# Patient Record
Sex: FEMALE | Race: BLACK OR AFRICAN AMERICAN | NOT HISPANIC OR LATINO | Employment: FULL TIME | URBAN - METROPOLITAN AREA
[De-identification: names, ages, dates, MRNs, and addresses within clinical notes are randomized per-mention and may not be internally consistent; named-entity substitution may affect disease eponyms.]

---

## 2020-08-07 ENCOUNTER — HOSPITAL ENCOUNTER (EMERGENCY)
Facility: HOSPITAL | Age: 40
Discharge: HOME | End: 2020-08-07
Attending: EMERGENCY MEDICINE
Payer: COMMERCIAL

## 2020-08-07 VITALS
BODY MASS INDEX: 43.4 KG/M2 | WEIGHT: 293 LBS | OXYGEN SATURATION: 100 % | HEIGHT: 69 IN | RESPIRATION RATE: 17 BRPM | TEMPERATURE: 99 F | SYSTOLIC BLOOD PRESSURE: 140 MMHG | DIASTOLIC BLOOD PRESSURE: 79 MMHG | HEART RATE: 81 BPM

## 2020-08-07 DIAGNOSIS — R07.81 RIB PAIN: ICD-10-CM

## 2020-08-07 DIAGNOSIS — R10.9 ABDOMINAL PAIN, UNSPECIFIED ABDOMINAL LOCATION: Primary | ICD-10-CM

## 2020-08-07 DIAGNOSIS — R10.9 ABDOMINAL PAIN: ICD-10-CM

## 2020-08-07 LAB
ALBUMIN SERPL BCP-MCNC: 3.5 G/DL (ref 3.5–5.2)
ALP SERPL-CCNC: 75 U/L (ref 55–135)
ALT SERPL W/O P-5'-P-CCNC: 39 U/L (ref 10–44)
ANION GAP SERPL CALC-SCNC: 7 MMOL/L (ref 8–16)
AST SERPL-CCNC: 47 U/L (ref 10–40)
B-HCG UR QL: NEGATIVE
BACTERIA #/AREA URNS AUTO: NORMAL /HPF
BASOPHILS # BLD AUTO: 0.05 K/UL (ref 0–0.2)
BASOPHILS NFR BLD: 0.8 % (ref 0–1.9)
BILIRUB SERPL-MCNC: 0.7 MG/DL (ref 0.1–1)
BILIRUB UR QL STRIP: NEGATIVE
BUN SERPL-MCNC: 7 MG/DL (ref 6–20)
CALCIUM SERPL-MCNC: 8.7 MG/DL (ref 8.7–10.5)
CHLORIDE SERPL-SCNC: 100 MMOL/L (ref 95–110)
CLARITY UR REFRACT.AUTO: CLEAR
CO2 SERPL-SCNC: 26 MMOL/L (ref 23–29)
COLOR UR AUTO: YELLOW
CREAT SERPL-MCNC: 0.8 MG/DL (ref 0.5–1.4)
CTP QC/QA: YES
DIFFERENTIAL METHOD: ABNORMAL
EOSINOPHIL # BLD AUTO: 0.2 K/UL (ref 0–0.5)
EOSINOPHIL NFR BLD: 3.2 % (ref 0–8)
ERYTHROCYTE [DISTWIDTH] IN BLOOD BY AUTOMATED COUNT: 14.4 % (ref 11.5–14.5)
EST. GFR  (AFRICAN AMERICAN): >60 ML/MIN/1.73 M^2
EST. GFR  (NON AFRICAN AMERICAN): >60 ML/MIN/1.73 M^2
GLUCOSE SERPL-MCNC: 96 MG/DL (ref 70–110)
GLUCOSE UR QL STRIP: NEGATIVE
HCT VFR BLD AUTO: 41 % (ref 37–48.5)
HGB BLD-MCNC: 12.9 G/DL (ref 12–16)
HGB UR QL STRIP: NEGATIVE
IMM GRANULOCYTES # BLD AUTO: 0.02 K/UL (ref 0–0.04)
IMM GRANULOCYTES NFR BLD AUTO: 0.3 % (ref 0–0.5)
KETONES UR QL STRIP: NEGATIVE
LEUKOCYTE ESTERASE UR QL STRIP: NEGATIVE
LIPASE SERPL-CCNC: 15 U/L (ref 4–60)
LYMPHOCYTES # BLD AUTO: 1.3 K/UL (ref 1–4.8)
LYMPHOCYTES NFR BLD: 20.9 % (ref 18–48)
MCH RBC QN AUTO: 28.6 PG (ref 27–31)
MCHC RBC AUTO-ENTMCNC: 31.5 G/DL (ref 32–36)
MCV RBC AUTO: 91 FL (ref 82–98)
MICROSCOPIC COMMENT: NORMAL
MONOCYTES # BLD AUTO: 0.7 K/UL (ref 0.3–1)
MONOCYTES NFR BLD: 11.2 % (ref 4–15)
NEUTROPHILS # BLD AUTO: 3.8 K/UL (ref 1.8–7.7)
NEUTROPHILS NFR BLD: 63.6 % (ref 38–73)
NITRITE UR QL STRIP: NEGATIVE
NRBC BLD-RTO: 0 /100 WBC
PH UR STRIP: 6 [PH] (ref 5–8)
PLATELET # BLD AUTO: 275 K/UL (ref 150–350)
PMV BLD AUTO: 11.7 FL (ref 9.2–12.9)
POTASSIUM SERPL-SCNC: 3.8 MMOL/L (ref 3.5–5.1)
PROT SERPL-MCNC: 7.9 G/DL (ref 6–8.4)
PROT UR QL STRIP: NEGATIVE
RBC # BLD AUTO: 4.51 M/UL (ref 4–5.4)
RBC #/AREA URNS AUTO: 1 /HPF (ref 0–4)
SODIUM SERPL-SCNC: 133 MMOL/L (ref 136–145)
SP GR UR STRIP: 1.02 (ref 1–1.03)
SQUAMOUS #/AREA URNS AUTO: 3 /HPF
URN SPEC COLLECT METH UR: NORMAL
WBC # BLD AUTO: 5.99 K/UL (ref 3.9–12.7)
WBC #/AREA URNS AUTO: 1 /HPF (ref 0–5)

## 2020-08-07 PROCEDURE — 99284 PR EMERGENCY DEPT VISIT,LEVEL IV: ICD-10-PCS | Mod: ,,, | Performed by: EMERGENCY MEDICINE

## 2020-08-07 PROCEDURE — 81001 URINALYSIS AUTO W/SCOPE: CPT

## 2020-08-07 PROCEDURE — 99284 EMERGENCY DEPT VISIT MOD MDM: CPT | Mod: 25

## 2020-08-07 PROCEDURE — 25000003 PHARM REV CODE 250: Performed by: EMERGENCY MEDICINE

## 2020-08-07 PROCEDURE — 99284 EMERGENCY DEPT VISIT MOD MDM: CPT | Mod: ,,, | Performed by: EMERGENCY MEDICINE

## 2020-08-07 PROCEDURE — 83690 ASSAY OF LIPASE: CPT

## 2020-08-07 PROCEDURE — 85025 COMPLETE CBC W/AUTO DIFF WBC: CPT

## 2020-08-07 PROCEDURE — 81025 URINE PREGNANCY TEST: CPT | Performed by: EMERGENCY MEDICINE

## 2020-08-07 PROCEDURE — 96360 HYDRATION IV INFUSION INIT: CPT

## 2020-08-07 PROCEDURE — 80053 COMPREHEN METABOLIC PANEL: CPT

## 2020-08-07 PROCEDURE — 96361 HYDRATE IV INFUSION ADD-ON: CPT

## 2020-08-07 RX ORDER — FAMOTIDINE 20 MG/1
20 TABLET, FILM COATED ORAL 2 TIMES DAILY
Qty: 20 TABLET | Refills: 0 | Status: SHIPPED | OUTPATIENT
Start: 2020-08-07 | End: 2020-08-14 | Stop reason: ALTCHOICE

## 2020-08-07 RX ORDER — ONDANSETRON 4 MG/1
4 TABLET, FILM COATED ORAL EVERY 8 HOURS PRN
Qty: 12 TABLET | Refills: 0 | Status: SHIPPED | OUTPATIENT
Start: 2020-08-07 | End: 2020-08-14

## 2020-08-07 RX ORDER — OMEPRAZOLE 20 MG/1
40 CAPSULE, DELAYED RELEASE ORAL DAILY
Qty: 60 CAPSULE | Refills: 0 | Status: SHIPPED | OUTPATIENT
Start: 2020-08-07 | End: 2020-08-14 | Stop reason: SDUPTHER

## 2020-08-07 RX ORDER — FAMOTIDINE 20 MG/1
20 TABLET, FILM COATED ORAL
Status: COMPLETED | OUTPATIENT
Start: 2020-08-07 | End: 2020-08-07

## 2020-08-07 RX ADMIN — FAMOTIDINE 20 MG: 20 TABLET, FILM COATED ORAL at 12:08

## 2020-08-07 RX ADMIN — SODIUM CHLORIDE 1000 ML: 0.9 INJECTION, SOLUTION INTRAVENOUS at 12:08

## 2020-08-07 NOTE — ED TRIAGE NOTES
"Patient states intermittent upper right abd pain that's feels "inflammed" States she stays "full" with known UTI. Unknown fevers, positive nausea.   "

## 2020-08-07 NOTE — ED PROVIDER NOTES
Chief complaint:  Abdominal Pain (r upper abd pain, nausea, had bladder infection)      HPI:  Elva Caceres is a 40 y.o. female presenting with acute onset of a pain to her right posterior lower rib cage that started about a week ago and has gradually been getting worse.  She describes it as a discomfort that originally starts in her back but then wraps around to the front of her abdomen towards her epigastrium.  She states that eating makes her pain worse and pain will eventually go away if she does not eat anything.  She has had some nausea but no vomiting.  Some subjective fevers.  She has had some recent antibiotics secondary to a bladder infection.    ROS: As per HPI and below:  Constitutional:  Subjective fevers, no chills  Eyes: no visual changes  Cardiac: no chest pain  Respiratory: no shortness of breath  Abdominal:  abdominal pain,  nausea, no vomiting  Genitourinary: No dysuria, no frequency  Skin: no rash  Heme: no bleeding  Musculoskeletal:  Right-sided flank pain  Neuro: no focal numbness, no focal weakness  Pyschological: no depression      Review of patient's allergies indicates:  No Known Allergies    No current facility-administered medications on file prior to encounter.      Current Outpatient Medications on File Prior to Encounter   Medication Sig Dispense Refill    triamterene-hydrochlorothiazide 37.5-25 mg (DYAZIDE) 37.5-25 mg per capsule TAKE ONE CAPSULE BY MOUTH IN THE MORNING 30 capsule 0       PMH:  As per HPI and below:  Past Medical History:   Diagnosis Date    Asthma     Hypertension     Vaginal trichomoniasis     several episodes last episode was early July 2015     No past surgical history on file.    Social History     Socioeconomic History    Marital status: Single     Spouse name: Not on file    Number of children: Not on file    Years of education: Not on file    Highest education level: Not on file   Occupational History    Not on file   Social Needs    Financial  resource strain: Not on file    Food insecurity     Worry: Not on file     Inability: Not on file    Transportation needs     Medical: Not on file     Non-medical: Not on file   Tobacco Use    Smoking status: Never Smoker   Substance and Sexual Activity    Alcohol use: Yes     Alcohol/week: 0.0 standard drinks    Drug use: No    Sexual activity: Not on file   Lifestyle    Physical activity     Days per week: Not on file     Minutes per session: Not on file    Stress: Not on file   Relationships    Social connections     Talks on phone: Not on file     Gets together: Not on file     Attends [employer] service: Not on file     Active member of club or organization: Not on file     Attends meetings of clubs or organizations: Not on file     Relationship status: Not on file   Other Topics Concern    Not on file   Social History Narrative    Not on file       No family history on file.    Physical Exam:    Vitals:    08/07/20 1450   BP: (!) 140/79   Pulse: 81   Resp: 17   Temp: 98.9 °F (37.2 °C)     Constitutional: Well-nourished, well-developed, in no acute distress, not cachectic  Eyes: PERRLA, EOMI, normal conjunctiva, normal sclera  ENT: Moist Mucous membranes  Respiratory: Clear to auscultation bilaterally, no wheezes, no crackles, no rhonchi  Cardiovascular: Regular rate and rhythm, no murmurs, no rubs, no gallops  Abdominal: Soft, mild epigastric abdominal tenderness, nondistended, no guarding, no rebound, equivocal Elizabeth sign, no CVA tenderness  Musculoskeletal: Normal range of motion, no obvious deformity, normal capillary refill, head atraumatic, neck supple, no meningismus  Skin: no rash, no ecchymosis, no errythema, no discharge  Neurologic: Cranial nerves II through XII intact, no motor deficits, no sensory deficits, no cerebellar deficits  Psychological: Alert, oriented x3, normal affect, normal mood    Orders Placed This Encounter   Procedures    X-Ray Chest PA And Lateral    US Abdomen  Limited (Gallbladder)    CBC auto differential    Comprehensive metabolic panel    Lipase    Urinalysis, Reflex to Urine Culture Urine, Clean Catch    Urinalysis Microscopic    POCT urine pregnancy    Insert Saline lock IV       Medications   sodium chloride 0.9% bolus 1,000 mL (0 mLs Intravenous Stopped 8/7/20 1513)   famotidine tablet 20 mg (20 mg Oral Given 8/7/20 1244)         Labs Reviewed   CBC W/ AUTO DIFFERENTIAL - Abnormal; Notable for the following components:       Result Value    Mean Corpuscular Hemoglobin Conc 31.5 (*)     All other components within normal limits   COMPREHENSIVE METABOLIC PANEL - Abnormal; Notable for the following components:    Sodium 133 (*)     AST 47 (*)     Anion Gap 7 (*)     All other components within normal limits   LIPASE   URINALYSIS, REFLEX TO URINE CULTURE    Narrative:     Specimen Source->Urine   URINALYSIS MICROSCOPIC    Narrative:     Specimen Source->Urine   POCT URINE PREGNANCY       MDM  Number of Diagnoses or Management Options  Abdominal pain, unspecified abdominal location:   Abdominal pain:   Rib pain:   Diagnosis management comments: Differential diagnosis includes gastritis, pancreatitis, cholecystitis, hepatitis, pneumonia, kidney stone, pyelonephritis    Patient presents with about a week's worth of pain that starts near her right lower ribcage and radiates around to her epigastric region.  By history, this feels more like gastritis with some referred pain.  Will check laboratories and chest x-ray as well as a right upper quadrant ultrasound and re-evaluate    Pt's labs and US are unremarkable.  No evidence for PE.  Pain is likely due to gastric source with referred pain.  Will dc with pepcid and prilosec and PCP fu.       Amount and/or Complexity of Data Reviewed  Clinical lab tests: ordered and reviewed  Tests in the radiology section of CPT®: ordered  Decide to obtain previous medical records or to obtain history from someone other than the  patient: yes  Independent visualization of images, tracings, or specimens: yes (RUQ US: nad)                  ASSESSMENT  1. Abdominal pain, unspecified abdominal location    2. Rib pain    3. Abdominal pain          Disposition:  Discharge    Discharge Medication List as of 8/7/2020  3:09 PM      START taking these medications    Details   famotidine (PEPCID) 20 MG tablet Take 1 tablet (20 mg total) by mouth 2 (two) times daily., Starting Fri 8/7/2020, Until Sat 8/7/2021, Normal      omeprazole (PRILOSEC) 20 MG capsule Take 2 capsules (40 mg total) by mouth once daily., Starting Fri 8/7/2020, Until Sat 8/7/2021, Normal      ondansetron (ZOFRAN) 4 MG tablet Take 1 tablet (4 mg total) by mouth every 8 (eight) hours as needed., Starting Fri 8/7/2020, Until Fri 8/14/2020, Normal           Discharge Medication List as of 8/7/2020  3:09 PM        Discharge Medication List as of 8/7/2020  3:09 PM             William Keane III, MD  08/08/20 0915

## 2020-08-07 NOTE — ED NOTES
Patient identifiers verified and correct for Ms Caceres  C/C: Intermittent abd pain   APPEARANCE: awake and alert in NAD.  SKIN: warm, dry and intact. No breakdown or bruising.  MUSCULOSKELETAL: Patient moving all extremities spontaneously, no obvious swelling or deformities noted. Ambulates independently.  RESPIRATORY: Denies shortness of breath.Respirations unlabored.   CARDIAC: Denies CP, 2+ distal pulses; no peripheral edema  ABDOMEN: ABd pain RUQ radiating ot back worse with eating positive nausea, no emesis unknown fevers   : voids spontaneously, denies difficulty  Neurologic: AAO x 4; follows commands equal strength in all extremities; denies numbness/tingling. Denies dizziness Denies weakness       Lori Taylor RN  08/07/20 121

## 2020-08-14 ENCOUNTER — OFFICE VISIT (OUTPATIENT)
Dept: FAMILY MEDICINE | Facility: HOSPITAL | Age: 40
End: 2020-08-14
Attending: FAMILY MEDICINE
Payer: COMMERCIAL

## 2020-08-14 ENCOUNTER — TELEPHONE (OUTPATIENT)
Dept: FAMILY MEDICINE | Facility: HOSPITAL | Age: 40
End: 2020-08-14

## 2020-08-14 VITALS
WEIGHT: 278.88 LBS | HEART RATE: 102 BPM | BODY MASS INDEX: 41.31 KG/M2 | SYSTOLIC BLOOD PRESSURE: 134 MMHG | HEIGHT: 69 IN | DIASTOLIC BLOOD PRESSURE: 90 MMHG

## 2020-08-14 DIAGNOSIS — K21.9 GASTROESOPHAGEAL REFLUX DISEASE WITHOUT ESOPHAGITIS: ICD-10-CM

## 2020-08-14 DIAGNOSIS — R13.19 ESOPHAGEAL DYSPHAGIA: ICD-10-CM

## 2020-08-14 DIAGNOSIS — J30.2 SEASONAL ALLERGIES: ICD-10-CM

## 2020-08-14 DIAGNOSIS — R35.0 URINARY FREQUENCY: ICD-10-CM

## 2020-08-14 DIAGNOSIS — R07.81 RIB PAIN ON RIGHT SIDE: Primary | ICD-10-CM

## 2020-08-14 PROCEDURE — 99213 OFFICE O/P EST LOW 20 MIN: CPT | Performed by: STUDENT IN AN ORGANIZED HEALTH CARE EDUCATION/TRAINING PROGRAM

## 2020-08-14 RX ORDER — TRIAMTERENE/HYDROCHLOROTHIAZID 37.5-25 MG
TABLET ORAL
COMMUNITY
Start: 2020-07-27 | End: 2020-08-14 | Stop reason: SDUPTHER

## 2020-08-14 RX ORDER — DICLOFENAC SODIUM 10 MG/G
2 GEL TOPICAL 4 TIMES DAILY
Qty: 1 TUBE | Refills: 5 | Status: SHIPPED | OUTPATIENT
Start: 2020-08-14

## 2020-08-14 RX ORDER — OMEPRAZOLE 20 MG/1
20 CAPSULE, DELAYED RELEASE ORAL 2 TIMES DAILY
Qty: 60 CAPSULE | Refills: 11 | Status: SHIPPED | OUTPATIENT
Start: 2020-08-14 | End: 2021-08-14

## 2020-08-14 NOTE — PATIENT INSTRUCTIONS
Stop the pepcid and start taking omeprazole 20mg twice a day for 2 weeks then go back to once per day.     Start taking an antihistamine either zyrtec or claritin daily. For your nose you can try flonase spray. If your nose is really dry you can try nasal saline spray. For your dry eyes try non-preservative eye drops.     Start using voltaren gel as needed for your rib pain. Work on doing stretched to open up your ribs. If the pain persists in a month please give me a call and we can discuss next steps.

## 2020-08-14 NOTE — PROGRESS NOTES
Progress Note  [facility]    Subjective:     Elva Caceres is a 40 y.o. year old female with PMHx of UTI in July treated with antibiotics who presents to clinic for hospital follow-up.     Pt was seen in the ER 8/7/2020 with right sided pain and diagnosed with GERD. Her pain starts on the back and radiates around to the front. Work-up negative for cholecystitis, cholelithiasis and pancreatitis. Discharged with omprazole 20mg daily. Since that time her rib pain has not improved. This will prevent her from finding a comfortable position to sit. Heating pad improves the pain. She has been unable to exercise as much due to the pain. She had a UTI treated with antibiotics in July. No abdominal or suprapubic pain but has some right sided flank pain similar to what she had during her UTI. She has also continued to have some urinary frequency. Pt reports completing her antibiotic course. Denies hematuria, dysuria, diarrhea, abdominal pain, fevers and chills.     Pt also has difficulty eating solid foods. She has converted herself to a soft diet due to the sensation of food getting stuck in her esophagus in her chest. No nausea or vomiting. Omeprazole has only slightly improved this but she continues to eat a soft diet. Symptoms started about a month ago after she finished antibiotics for a UTI in July. She has also had some post-nasal drip and sinus pain in this time she attributes to allergies. Tried afrin twice with no improvement in symptoms. No OTC antihistamines currently but took some in the past. Also has some eye dryness but no purities.     Patient Active Problem List    Diagnosis Date Noted    Suprapubic abdominal pain 08/13/2015    Lower back pain 08/13/2015    Gastric pain 08/13/2015    Adnexal tenderness, right 08/13/2015        Review of patient's allergies indicates:  No Known Allergies     Past Medical History:   Diagnosis Date    Asthma     Hypertension     Vaginal trichomoniasis      "several episodes last episode was early July 2015      No past surgical history on file.   No family history on file.   Social History     Tobacco Use    Smoking status: Current Some Day Smoker   Substance Use Topics    Alcohol use: Yes     Alcohol/week: 0.0 standard drinks        Objective:     Vitals:    08/14/20 0855   BP: (!) 134/90   Pulse: 102   Weight: 126.5 kg (278 lb 14.1 oz)   Height: 5' 9" (1.753 m)     BMI: 41.18    Gen: No apparent distress, well nourished and developed, appears stated age  CV: RRR, S1 and S2 present, no LE edema  Resp: CTAB, normal respiratory effort  Skin: 5mm hard dark lesion on the right flank, mobile, non-tender. Hard mobile mass with pinpoint dark spot on the left upper thoracic area, non-tender, non-erythematous  MSK: Tenderness to palpation of posterior 9th rib angle and rib on the right spanning around the front, poor mobility or rib. No edema or erythema or step-offs of rib.   Abd: soft, non-distended, BSx4, non-tender throughout. No CVA tenderness.     Assessment/Plan:     Elva Caceres is a 40 y.o. year old female with PMHx of UTI in July who presents to clinic for hospital follow-up. She needs an annual physical, plan to do this at next visit.     1. Urinary frequency  History of UTI in July, treated with antibiotics, but continues to have urinary frequency. Will recheck UA in clinic to confirm resolution and that right sided pain not related to kidney infection. Reassuring exam without CVA tenderness.   - POCT urine dipstick without microscope    2. Rib pain on right side  Her right sided pain appears most consistent with rib pain. Given she has some reflux problems educated to avoid NSAIDS. Can use topical Voltaren gel for relief. Pt also given some stretches to help open up her ribs and reduce the pain.   - diclofenac sodium (VOLTAREN) 1 % Gel; Apply 2 g topically 4 (four) times daily.  Dispense: 1 Tube; Refill: 5  - stretches multiple times per day     3. Esophageal " dysphagia  Her sensation of food getting stuck in her esophagus is likely related to allergies causing PND and reflux. Will increase omeprazole dose to 20mg BID and she should start taking antihistamines to reduce mucous load in the back of her throat. If these fail to improve symptoms will likely need EGD to r/o malignancy vs esophagitis vs gastric herniation vs esophageal motility disorder.    4. Gastroesophageal reflux disease without esophagitis  Plan as above  - omeprazole (PRILOSEC) 20 MG capsule; Take 1 capsule (20 mg total) by mouth 2 (two) times a day.  Dispense: 60 capsule; Refill: 11    5. Seasonal allergies  Plan as above  - start zyrtec or Claritin for allergies and PND  - flonase PRN for nasal symptoms  - non-preservative eye drops PRN for eye dryness    Follow-up:1 month    Case discussed with staff: Dr. Torsten Sorto,   \[facility]\"" Family Medicine, PGY-1

## 2020-09-02 ENCOUNTER — HOSPITAL ENCOUNTER (EMERGENCY)
Facility: HOSPITAL | Age: 40
Discharge: HOME | End: 2020-09-03
Attending: EMERGENCY MEDICINE
Payer: COMMERCIAL

## 2020-09-02 DIAGNOSIS — G51.8 FACIAL NEURALGIA: Primary | ICD-10-CM

## 2020-09-02 DIAGNOSIS — L72.3 SEBACEOUS CYST: ICD-10-CM

## 2020-09-02 LAB
B-HCG UR QL: NEGATIVE
CTP QC/QA: YES

## 2020-09-02 PROCEDURE — 99284 EMERGENCY DEPT VISIT MOD MDM: CPT | Mod: 25

## 2020-09-02 PROCEDURE — 81025 URINE PREGNANCY TEST: CPT | Performed by: NURSE PRACTITIONER

## 2020-09-02 PROCEDURE — 25000003 PHARM REV CODE 250: Performed by: EMERGENCY MEDICINE

## 2020-09-02 RX ORDER — PREDNISONE 20 MG/1
40 TABLET ORAL DAILY
Qty: 10 TABLET | Refills: 0 | Status: SHIPPED | OUTPATIENT
Start: 2020-09-02 | End: 2020-09-07

## 2020-09-02 RX ORDER — VALACYCLOVIR HYDROCHLORIDE 1 G/1
1000 TABLET, FILM COATED ORAL 3 TIMES DAILY
Qty: 21 TABLET | Refills: 0 | Status: SHIPPED | OUTPATIENT
Start: 2020-09-02 | End: 2020-09-09

## 2020-09-02 RX ORDER — PROPYLENE GLYCOL 0.06 MG/ML
1 EMULSION OPHTHALMIC 4 TIMES DAILY PRN
Refills: 0
Start: 2020-09-02

## 2020-09-02 RX ORDER — CARBAMAZEPINE 100 MG/1
100 TABLET, EXTENDED RELEASE ORAL 2 TIMES DAILY
Qty: 60 TABLET | Refills: 0 | Status: SHIPPED | OUTPATIENT
Start: 2020-09-02 | End: 2021-09-02

## 2020-09-02 RX ORDER — PROPARACAINE HYDROCHLORIDE 5 MG/ML
1 SOLUTION/ DROPS OPHTHALMIC
Status: COMPLETED | OUTPATIENT
Start: 2020-09-02 | End: 2020-09-02

## 2020-09-02 RX ADMIN — FLUORESCEIN SODIUM 1 EACH: 1 STRIP OPHTHALMIC at 11:09

## 2020-09-02 RX ADMIN — PROPARACAINE HYDROCHLORIDE 1 DROP: 5 SOLUTION/ DROPS OPHTHALMIC at 11:09

## 2020-09-03 VITALS
RESPIRATION RATE: 18 BRPM | OXYGEN SATURATION: 100 % | BODY MASS INDEX: 42.29 KG/M2 | HEIGHT: 69 IN | TEMPERATURE: 98 F | HEART RATE: 89 BPM | SYSTOLIC BLOOD PRESSURE: 193 MMHG | WEIGHT: 285.5 LBS | DIASTOLIC BLOOD PRESSURE: 100 MMHG

## 2020-09-03 NOTE — ED PROVIDER NOTES
Encounter Date: 9/2/2020    SCRIBE #1 NOTE: I, Isela Blas, am scribing for, and in the presence of,  Dr. Lefort. I have scribed the entire note.       History     Chief Complaint   Patient presents with    Facial Pain     Complaining of numbness to right side of face from forehead to below eyes.  When I touch forehead pt says it is a pain.  States she can feel me touching.  States it feels like someone hit her in face.Face symmetrical. Hand grasps strong.       Time seen by provider: 11:47 PM on 09/02/2020    The patient is a 40 y.o. female who presents to the ED with complaint of 2 days of right sided feral temporal headache associated paraesthesia described as tingling to the right frontal scalp and periorbital area. Symptoms are constant in severity. Associated sxs include pain to her right eye, and burning and itching sensation to her outer right ear. Patient denies any rash, fever, chills, neck stiffness, and all other sxs at this time. No prior Tx included. Initial /97     has a past medical history of Asthma, Hypertension, and Vaginal trichomoniasis.  has no past surgical history on file.    The history is provided by the patient.     Review of patient's allergies indicates:  No Known Allergies  Past Medical History:   Diagnosis Date    Asthma     Hypertension     Vaginal trichomoniasis     several episodes last episode was early July 2015     No past surgical history on file.  No family history on file.  Social History     Tobacco Use    Smoking status: Current Some Day Smoker   Substance Use Topics    Alcohol use: Yes     Alcohol/week: 0.0 standard drinks    Drug use: No     Review of Systems   Constitutional: Negative for chills and fever.   HENT: Positive for ear pain. Negative for sore throat.    Eyes: Positive for pain. Negative for redness.   Respiratory: Negative for shortness of breath.    Cardiovascular: Negative for chest pain.   Gastrointestinal: Negative for abdominal pain,  diarrhea and vomiting.   Genitourinary: Negative for dysuria.   Musculoskeletal: Negative for back pain.   Skin: Negative for rash.   Neurological: Positive for headaches.       Physical Exam     Initial Vitals [09/02/20 1948]   BP Pulse Resp Temp SpO2   (!) 171/97 89 18 97.9 °F (36.6 °C) 98 %      MAP       --         Physical Exam    Nursing note and vitals reviewed.  Constitutional: She appears well-developed and well-nourished. She is not diaphoretic. No distress.   HENT:   Head: Normocephalic and atraumatic.   Eyes: Conjunctivae and EOM are normal.   No fluorosis uptake to the eye, vision intact, no trismus    Neck: Normal range of motion. Neck supple.   Cardiovascular: Normal rate, regular rhythm and normal heart sounds.   Pulmonary/Chest: Breath sounds normal. No respiratory distress.   Abdominal: Soft. There is no abdominal tenderness.   Musculoskeletal: Normal range of motion. No tenderness or edema.   Neurological: She is alert and oriented to person, place, and time. She has normal strength.   Skin: Skin is warm and dry. Capillary refill takes less than 2 seconds.   Sebaceous cyst to mid thoracis back, not inflamed         ED Course   Procedures  Labs Reviewed   POCT URINE PREGNANCY          Imaging Results    None          Medical Decision Making:   History:   Old Medical Records: I decided to obtain old medical records.  Initial Assessment:   40 year old female presents to the ED complaining of right sided feral temporal headache . The patient was seen and examined. The history and physical exam was obtained. The nursing notes and vital signs were reviewed.     Secondary to symptoms and exam findings we ordered:    Labs: POCT    DDx include, but are not limited to, Trigeminal neuralgia, subclinical shingles , temporal arteritis, glaucoma, dental carries .    Patient will be administered fluorescein ophthalmic and proparacaine 0.5%.  Patient will be monitored here.  Clinical Tests:   Lab Tests: Ordered  and Reviewed  ED Management:  Unclear etiology, however exam reassuring.  After complete evaluation, including thorough history and physical exam, the patient's symptoms are most likely due to neuralgia, subclinical herpetic vs trigeminal. The history does not suggest sudden/maximal onset of pain consistent with SAH/intracranial bleed. Physical exam is benign without focal weakness, sensory deficit, or cerebellar signs to suggest stroke or intracranial mass. There is no meningismus, fever, or evidence of infection to suggest meningitis/encephalitis. DC with tegretol, acyclovir. Close follow up and return precautions discussed.                                   Clinical Impression:       ICD-10-CM ICD-9-CM   1. 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 Sebaceous cyst  L72.3 706.2         Disposition:   Disposition: Discharged  Condition: Stable       I, Dr. Guy Lefort, personally performed the services described in this documentation. All medical record entries made by the scribe were at my direction and in my presence. I have reviewed the chart and agree that the record reflects my personal performance and is accurate and complete. Guy Lefort, MD.  2:03 AM 09/03/2020                 Guy J. Lefort, MD  09/03/20 0204

## 2020-09-03 NOTE — ED TRIAGE NOTES
Patient presents to the ED with complaints of right sided facial pain x 2-3 days and back pain from 2 cysts located to back x 10 years. Pain can be felt from right forehead to right top of cheek. Patient states she can feel pressure behind eye. Pain feels like someone hit her on side of face. Also complaining of eye itching. Patient states she has suffered from allergies before but nothing like this. Patients vision is clear. Sensation from right side of forehead and cheek different than left per patient. Patient states slight numb sensation. She also mentioned sometimes her right ring finger will get a tingly sensation but then passes but has been present for 2 weeks.     Review of patient's allergies indicates:  No Known Allergies     Patient has verified the spelling of their name and  on armband.   APPEARANCE: Patient is alert, calm, oriented x 4, and does not appear distressed.  SKIN: Skin is normal for race, warm, and dry. Normal skin turgor and mucous membranes moist. +2 hard cysts located to back that have been present fo 10 years. Now causing her pain where she cant even lay down. Pain will radiate to both arms depending on what side she lays on.   CARDIAC: Normal rate and rhythm, no murmur heard. Denies chest pain  RESPIRATORY:Normal rate and effort. Breath sounds clear bilaterally throughout chest. Respirations are equal and unlabored.    GASTRO: Bowel sounds normal, abdomen is soft, no tenderness, and no abdominal distention.  MUSCLE: Full ROM. No bony tenderness or soft tissue tenderness. No obvious deformity. +Back pain from cysts  PERIPHERAL VASCULAR: peripheral pulses present. Normal cap refill. No edema. Warm to touch.  NEURO: 5/5 strength major flexors/extensors bilaterally. Sensory intact to light touch bilaterally. [place] coma scale: eyes open spontaneously-4, oriented & converses-5, obeys commands-6. No neurological abnormalities.   MENTAL STATUS: awake, alert and aware of environment.  EYE:  No overt deficits noted. No drainage. Sclera WNL  ENT: EARS: no obvious drainage. NOSE: no active bleeding. THROAT: no redness or swelling.  : Voids without complication

## 2020-09-03 NOTE — FIRST PROVIDER EVALUATION
Emergency Department TeleTRIAGE Encounter Note      CHIEF COMPLAINT    Chief Complaint   Patient presents with    Facial Pain     Complaining of numbness to right side of face from forehead to below eyes.  When I touch forehead pt says it is a pain.  States she can feel me touching.  States it feels like someone hit her in face.Face symmetrical. Hand grasps strong.       VITAL SIGNS   Initial Vitals [09/02/20 1948]   BP Pulse Resp Temp SpO2   (!) 171/97 89 18 97.9 °F (36.6 °C) 98 %      MAP       --            ALLERGIES    Review of patient's allergies indicates:  No Known Allergies    PROVIDER TRIAGE NOTE  This is a teletriage evaluation of a 40 y.o. female presenting to the ED with c/o right facial pain.  She reports the pain from the right-sided parietal area to the right cheek.  She reports it is painful when she palpates over the forehead and the cheek, over the frontal and maxillary sinuses.  She reports sensation is symmetric bilaterally.  I notice no slurred speech.  She reports no facial droop.  Also makes mention of a cyst on her back that she would like looked at.  Initial orders will be placed and care will be transferred to an alternate provider when patient is roomed for a full evaluation. Any additional orders and the final disposition will be determined by that provider.         ORDERS  Labs Reviewed - No data to display    ED Orders (720h ago, onward)    Start Ordered     Status Ordering Provider    09/02/20 1956 09/02/20 1955  POCT urine pregnancy  Once      Ordered BARTOLOME ALARCON            Virtual Visit Note: The provider triage portion of this emergency department evaluation and documentation was performed via [employer], a HIPAA-compliant telemedicine application, in concert with a tele-presenter in the room. A face to face patient evaluation with one of my colleagues will occur once the patient is placed in an emergency department room.      DISCLAIMER: This note was prepared with  M*Modal voice recognition transcription software. Garbled syntax, mangled pronouns, and other bizarre constructions may be attributed to that software system.

## 2020-09-04 ENCOUNTER — PES CALL (OUTPATIENT)
Dept: ADMINISTRATIVE | Facility: CLINIC | Age: 40
End: 2020-09-04

## 2020-09-11 ENCOUNTER — OFFICE VISIT (OUTPATIENT)
Dept: FAMILY MEDICINE | Facility: HOSPITAL | Age: 40
End: 2020-09-11
Attending: FAMILY MEDICINE
Payer: COMMERCIAL

## 2020-09-11 ENCOUNTER — LAB VISIT (OUTPATIENT)
Dept: LAB | Facility: HOSPITAL | Age: 40
End: 2020-09-11
Attending: FAMILY MEDICINE
Payer: COMMERCIAL

## 2020-09-11 VITALS
DIASTOLIC BLOOD PRESSURE: 102 MMHG | SYSTOLIC BLOOD PRESSURE: 133 MMHG | WEIGHT: 276.44 LBS | BODY MASS INDEX: 40.94 KG/M2 | HEART RATE: 99 BPM | HEIGHT: 69 IN

## 2020-09-11 DIAGNOSIS — Z00.00 PREVENTATIVE HEALTH CARE: Primary | ICD-10-CM

## 2020-09-11 DIAGNOSIS — Z00.00 PREVENTATIVE HEALTH CARE: ICD-10-CM

## 2020-09-11 DIAGNOSIS — I10 ESSENTIAL HYPERTENSION: ICD-10-CM

## 2020-09-11 PROBLEM — R13.19 ESOPHAGEAL DYSPHAGIA: Status: RESOLVED | Noted: 2020-08-14 | Resolved: 2020-09-11

## 2020-09-11 LAB
ALBUMIN SERPL BCP-MCNC: 3.5 G/DL (ref 3.5–5.2)
ALP SERPL-CCNC: 66 U/L (ref 55–135)
ALT SERPL W/O P-5'-P-CCNC: 26 U/L (ref 10–44)
ANION GAP SERPL CALC-SCNC: 9 MMOL/L (ref 8–16)
AST SERPL-CCNC: 26 U/L (ref 10–40)
BILIRUB SERPL-MCNC: 0.4 MG/DL (ref 0.1–1)
BUN SERPL-MCNC: 8 MG/DL (ref 6–20)
CALCIUM SERPL-MCNC: 9.3 MG/DL (ref 8.7–10.5)
CHLORIDE SERPL-SCNC: 99 MMOL/L (ref 95–110)
CHOLEST SERPL-MCNC: 207 MG/DL (ref 120–199)
CHOLEST/HDLC SERPL: 4 {RATIO} (ref 2–5)
CO2 SERPL-SCNC: 28 MMOL/L (ref 23–29)
CREAT SERPL-MCNC: 0.8 MG/DL (ref 0.5–1.4)
EST. GFR  (AFRICAN AMERICAN): >60 ML/MIN/1.73 M^2
EST. GFR  (NON AFRICAN AMERICAN): >60 ML/MIN/1.73 M^2
ESTIMATED AVG GLUCOSE: 120 MG/DL (ref 68–131)
GLUCOSE SERPL-MCNC: 98 MG/DL (ref 70–110)
HBA1C MFR BLD HPLC: 5.8 % (ref 4–5.6)
HDLC SERPL-MCNC: 52 MG/DL (ref 40–75)
HDLC SERPL: 25.1 % (ref 20–50)
LDLC SERPL CALC-MCNC: 139.8 MG/DL (ref 63–159)
NONHDLC SERPL-MCNC: 155 MG/DL
POTASSIUM SERPL-SCNC: 3.9 MMOL/L (ref 3.5–5.1)
PROT SERPL-MCNC: 7.3 G/DL (ref 6–8.4)
SODIUM SERPL-SCNC: 136 MMOL/L (ref 136–145)
TRIGL SERPL-MCNC: 76 MG/DL (ref 30–150)

## 2020-09-11 PROCEDURE — 80061 LIPID PANEL: CPT

## 2020-09-11 PROCEDURE — 86803 HEPATITIS C AB TEST: CPT

## 2020-09-11 PROCEDURE — 83036 HEMOGLOBIN GLYCOSYLATED A1C: CPT

## 2020-09-11 PROCEDURE — 86703 HIV-1/HIV-2 1 RESULT ANTBDY: CPT

## 2020-09-11 PROCEDURE — 80053 COMPREHEN METABOLIC PANEL: CPT

## 2020-09-11 PROCEDURE — 99213 OFFICE O/P EST LOW 20 MIN: CPT | Performed by: STUDENT IN AN ORGANIZED HEALTH CARE EDUCATION/TRAINING PROGRAM

## 2020-09-11 PROCEDURE — 36415 COLL VENOUS BLD VENIPUNCTURE: CPT

## 2020-09-11 RX ORDER — FLUTICASONE FUROATE AND VILANTEROL TRIFENATATE 100; 25 UG/1; UG/1
POWDER RESPIRATORY (INHALATION)
COMMUNITY
Start: 2020-08-24

## 2020-09-11 NOTE — PATIENT INSTRUCTIONS
Please go to the pharmacy and get a tetanus shot at your earliest convenience.     Go ahead and start to come off of your omeprazole. You can start taking it every other day for 1 week and then stop taking it all together, only using it as needed for difficulty swallowing or heartburn. If your symptoms return then you can start taking it again daily.     Please check your blood pressure 1x per week. Also check if you feel light headed, have a headache or visual changes. Goal of less tan 140/90.     Please go to the lab and get your blood drawn.

## 2020-09-11 NOTE — PROGRESS NOTES
History & Physical  [facility]      SUBJECTIVE:     History of Present Illness:  Patient is a 40 y.o. female with history of HTN presents to clinic for an annual wellness visit.     Seen in the ED last week for trigeminal neuralgia. Currently on carbamazepine. Symptoms have resolved.     Problems she was having with esophageal dysmotility and loss of appetite have resolved with omeprazole 40mg per day. Since she was doing better she reduced her dose to 20mg per day and has been tolerating it well.     HTN: Does not check BP at home. No headache, light headedness or other symptoms from HTN. Compliant with medication.     Last pap smear- >3 years ago  LMP- . Menstrual cycles regular.     Immunizations- due for tetanus shot    Exercise- reduced as she has not been feeling well last 2 months but is working on increasing exercise now that she feels better  Sexual history- sexually active, 1 male partner, no protection, no birth control. No abnormal vaginal discharge. Would like to be tested for STIs as she had episode of lower abdominal pain after intercourse last night.       Review of patient's allergies indicates:  No Known Allergies    Past Medical History:   Diagnosis Date    Asthma     Hypertension      No past surgical history on file.  No family history on file.  Social History     Tobacco Use    Smoking status: Former Smoker     Quit date: 2020     Years since quittin.1   Substance Use Topics    Alcohol use: Yes     Alcohol/week: 0.0 standard drinks    Drug use: No        Review of Systems:  As per Subjective  Constitutional: denies fever and chills  Eyes: denies visual changes and blurred vision  Respiratory: denies cough, shortness of breath and wheezing  Cardiovascular: denies chest pain and palpitations  Gastrointestinal: denies nausea, vomiting and change in bowel habits  Genitourinary: denies difficulty urinating and changes in urination  Musculoskeletal: denies  "joint pain and decreased ROM  Neurological: denies numbness, tingling, headache and dizziness  Behavioral/Psych: denies anxiety and depression  Skin: denies rash, lesions, and easy bleeding/bruising    OBJECTIVE:     Vital Signs (Most Recent)  Vitals:    09/11/20 0902   BP: (!) 133/102   BP Location: Right arm   Patient Position: Sitting   BP Method: Large (Automatic)   Pulse: 99   Weight: 125.4 kg (276 lb 7.3 oz)   Height: 5' 8.5" (1.74 m)     BMI: 41.42    Physical Exam:  Gen: Well nourished and developed, NAD, appears stated age  HEENT: normocephalic, atraumatic, PERRLA, external ear normal, no nasal septal deviation, oropharynx mucosa pink and moist without tonsillar exudates  Neck: trachea midline, no LAD, no thyromegaly  CV: RRR, S1 and S2 present, no murmurs, no LE edema, radial and dorsalis pedis pulses equal and present bilaterally  Resp: breathing comfortably on room air, CTAB, no wheezes or crackles  Abd: Non-distended, BSx4, non-tender  Skin: No rashes or abnormal skin lesions, no apparent jaundice or bruising. 1cm in diameter mobile skin colored mass mid thoracic spine.   Neuro: A&Ox4, sensation intact throughout, spontaneous movements, gait smooth harris wnl   MSK: Full ROM of all extremities, no tenderness to palpation of shoulders and knees, no joint edema  Psych: Logical thought process, answers questions appropriately    GYN: External genitalia wnl, no apparent masses or lesions, bimanual examination performed and tolerated by the patient, +cervical motion tenderness and right adnexal tenderness. Speculum examination performed and tolerated by the patient. Cervical os closed and without obvious lesions. No abnormal vaginal discharge appreciated. pap test obtained, pt tolerated procedure well. STI vaginal swabs obtained, pt tolerated procedure well. Microscopic exam of wet prep of swab with normal squamous epithelia.     Pelvic examination performed by Rob Sorto DO in the presence of a " chaperone.         ASSESSMENT/PLAN:   40 y.o.female with history of HTN presents to clinic for annual wellness visit.     Plan:     1. Preventative health care  - HIV 1/2 Ag/Ab (4th Gen); Future  - Hepatitis C Antibody; Future  - Lipid Panel; Future  - Hemoglobin A1C; Future  - Comprehensive metabolic panel; Future  - Liquid-Based Pap Smear, Screening  - HPV High Risk Genotypes, PCR  - CULTURE, GONOCOCCUS    2. GERD/Esophageal dysmotility  - Taper off of omeprazole  - Symptoms currently well controlled and dysmotility resolved.    3. HTN  - Continue current treatment  - Check BP 1x per week or if symptomatic     Follow-up: 3 months    Rob Sorto DO  [facility], PGY-1

## 2020-09-14 ENCOUNTER — TELEPHONE (OUTPATIENT)
Dept: FAMILY MEDICINE | Facility: HOSPITAL | Age: 40
End: 2020-09-14

## 2020-09-14 LAB
HCV AB SERPL QL IA: NEGATIVE
HIV 1+2 AB+HIV1 P24 AG SERPL QL IA: NEGATIVE

## 2020-09-14 NOTE — TELEPHONE ENCOUNTER
Called pt to inform her of lab results. Lipid panel and CMP unremarkable however her A1C was slightly elevated at 5.8%. Counseling given on sugar and carb reduction. No medication initiation needed at this time. All questions answered. Pt verbalized understanding. HIV, Hep C, GC and pap tests still pending. Will call when they have resulted.     Rob Sorto, DO  [facility], PGY-1

## 2020-09-16 ENCOUNTER — PATIENT MESSAGE (OUTPATIENT)
Dept: FAMILY MEDICINE | Facility: HOSPITAL | Age: 40
End: 2020-09-16

## 2020-09-16 DIAGNOSIS — R10.32 ACUTE LEFT LOWER QUADRANT PAIN: Primary | ICD-10-CM

## 2020-09-16 DIAGNOSIS — R10.2 ADNEXAL TENDERNESS: ICD-10-CM

## 2020-09-16 RX ORDER — MELOXICAM 7.5 MG/1
7.5 TABLET ORAL DAILY PRN
Qty: 30 TABLET | Refills: 0 | Status: SHIPPED | OUTPATIENT
Start: 2020-09-16 | End: 2020-10-16

## 2020-09-16 NOTE — TELEPHONE ENCOUNTER
Called pt per request about left sided abdominal pain. Pt states pain is from her L ovary. Pain is preventing her from sleeping. Had polyps on uterus removed in the past, unsure if related. Pain is radiating to the back. No history of ovarian cysts that she is aware of. When pt saw me 9/11/2020 she was complaining of some pain and had CMT and adnexal tenderness on pelvic exam. At that time it was felt to be related to recent sexual encounter she had the day before. However, pain has since gotten worse. No changes in urination. No vaginal discharge. GC test done with pap smear has not yet resulted. Unfortunately given lag in results due to lack of testing modality in the state shared decision making was done and pt decided to get a urine gonorrhea/chlamydia test with the hope this will result sooner. Pt also informed of normal HIV and Hep C lab results.     Plan to obtain pelvic US to look for ovarian cysts. Ddx include ovarian cyst vs STD vs PID vs kidney stone. Possible etiologies discussed and signs and symptoms warranting ER/urgent care visit. Meloxicam 7.5mg ordered for pain, can take up to 15mg per day. Pt questions were answered and she verbalized understanding of plan. Will need to have close monitoring and will reach out once US and GC results are in to determine next steps.     Rob Sorto, DO  [facility], PGY-1

## 2020-09-17 ENCOUNTER — PATIENT MESSAGE (OUTPATIENT)
Dept: FAMILY MEDICINE | Facility: HOSPITAL | Age: 40
End: 2020-09-17

## 2020-09-17 ENCOUNTER — HOSPITAL ENCOUNTER (OUTPATIENT)
Dept: RADIOLOGY | Facility: HOSPITAL | Age: 40
Discharge: HOME | End: 2020-09-17
Attending: STUDENT IN AN ORGANIZED HEALTH CARE EDUCATION/TRAINING PROGRAM
Payer: COMMERCIAL

## 2020-09-17 ENCOUNTER — TELEPHONE (OUTPATIENT)
Dept: FAMILY MEDICINE | Facility: HOSPITAL | Age: 40
End: 2020-09-17

## 2020-09-17 DIAGNOSIS — R10.2 ADNEXAL TENDERNESS: ICD-10-CM

## 2020-09-17 DIAGNOSIS — N83.202 CYST OF LEFT OVARY: Primary | ICD-10-CM

## 2020-09-17 DIAGNOSIS — N84.0 ENDOMETRIAL POLYP: ICD-10-CM

## 2020-09-17 DIAGNOSIS — R10.32 ACUTE LEFT LOWER QUADRANT PAIN: Primary | ICD-10-CM

## 2020-09-17 DIAGNOSIS — D25.9 UTERINE LEIOMYOMA, UNSPECIFIED LOCATION: ICD-10-CM

## 2020-09-17 DIAGNOSIS — R10.32 ACUTE LEFT LOWER QUADRANT PAIN: ICD-10-CM

## 2020-09-17 PROBLEM — K21.00 GASTROESOPHAGEAL REFLUX DISEASE WITH ESOPHAGITIS: Status: ACTIVE | Noted: 2020-08-14

## 2020-09-17 PROCEDURE — 76830 US PELVIS COMP WITH TRANSVAG NON-OB (XPD): ICD-10-PCS | Mod: 26,,, | Performed by: RADIOLOGY

## 2020-09-17 PROCEDURE — 76830 TRANSVAGINAL US NON-OB: CPT | Mod: 26,,, | Performed by: RADIOLOGY

## 2020-09-17 PROCEDURE — 76856 US EXAM PELVIC COMPLETE: CPT | Mod: 26,,, | Performed by: RADIOLOGY

## 2020-09-17 PROCEDURE — 76856 US PELVIS COMP WITH TRANSVAG NON-OB (XPD): ICD-10-PCS | Mod: 26,,, | Performed by: RADIOLOGY

## 2020-09-17 PROCEDURE — 76830 TRANSVAGINAL US NON-OB: CPT | Mod: TC

## 2020-09-17 RX ORDER — ONDANSETRON HYDROCHLORIDE 8 MG/1
8 TABLET, FILM COATED ORAL EVERY 8 HOURS PRN
Qty: 8 TABLET | Refills: 1 | Status: ON HOLD | OUTPATIENT
Start: 2020-09-17 | End: 2020-09-23 | Stop reason: HOSPADM

## 2020-09-17 RX ORDER — ACETAMINOPHEN 325 MG/1
325 TABLET ORAL EVERY 6 HOURS PRN
Qty: 30 TABLET | Refills: 1 | Status: SHIPPED | OUTPATIENT
Start: 2020-09-17

## 2020-09-17 NOTE — TELEPHONE ENCOUNTER
"Called pt to notify her of pelvic US results:   : "1. Left adnexal complex cyst, possibly a corpus luteal cyst.  Given patient's reported history of pelvic/perineal pain, recommend follow-up ultrasound in 6-12 weeks.  2. Subcentimeter intramural uterine fibroid.  3. At least three echogenic lobulated foci along the endometrial stripe most concerning for endometrial polyps.  Clinical considerations will determine the need for further workup."    Pt continues to have uncontrolled pain. Took 15mg meloxicam last night which helped the pain but it has returned. Relieved with OTC Pain Away (mix of acetaminophen, ASA, salicylamide and caffeine). Yesterday and today she has not been able to eat and this morning she thew up yellow acid liquid. She feels like food is getting stuck in her throat. Able to swallow liquids. Counseled pt that this is likely a flare of GERD, which we have been working to improve over last couple of months. She had this difficulty swallowing previously but resolved with lowering stress and Omeprazole 40mg per day. Told pt to increase her omeprazole to 40-60mg per day and try her best to take the meloxicam with food and a lot of water as her pain medications she has been taking have likely been aggravating her stomach. Also counseled and reassured her swallowing symptoms are not likely to be related to her thyroid or malignancy given normal blood work and reassuring physcial exam at last visit and that this is likely caused by another flare of her reflux. Also reassured that her symptoms previously resolved.     Pt was previously aware of uterine fibroid. However, she had an endometrial polypectomy in the past at [facility] and now her polyps have recurred. Will need OB/Gyn follow-up for further evaluation and possible treatment.     Once again offered for pt to go to ER for better pain management. Pt declined at this time given multiple ER visits she has had in last 2 months and that she does not " desire another large co-pay. Told pt I am willing to see her in clinic tomorrow morning if desired and discussed symptoms that would warrant ER visit. Pelvic US done yesterday reassuring that pain due to ovarian cyst and not torsion, however significant worsening of pain and symptoms could be concerning for this and would warrant an ER visit.     Plan:     - Urgent referral to OB/Gyn for polyps, fibroid and ovarian cyst evaluation  - Continue meloxicam 7.5mg per day  - Start acetaminophen 350mg q6hrs PRN  - Start zofran 4mg PRN for nausea  - Increase omeprazole dose to 60mg per day    Rob Sorto DO  [facility], PGY-1

## 2020-09-18 ENCOUNTER — PATIENT MESSAGE (OUTPATIENT)
Dept: FAMILY MEDICINE | Facility: HOSPITAL | Age: 40
End: 2020-09-18

## 2020-09-18 DIAGNOSIS — K22.4 ESOPHAGEAL DYSFUNCTION: Primary | ICD-10-CM

## 2020-09-18 DIAGNOSIS — K21.00 GASTROESOPHAGEAL REFLUX DISEASE WITH ESOPHAGITIS: ICD-10-CM

## 2020-09-18 DIAGNOSIS — R06.89 BREATHING DIFFICULTY: ICD-10-CM

## 2020-09-18 DIAGNOSIS — R63.8 DECREASED ORAL INTAKE: ICD-10-CM

## 2020-09-18 PROCEDURE — 96372 THER/PROPH/DIAG INJ SC/IM: CPT | Performed by: EMERGENCY MEDICINE

## 2020-09-18 NOTE — TELEPHONE ENCOUNTER
Sent message to pt notifying her of normal pap test and negative gonorrhea/chlamydia test. She can send me a message or call clinic with further questions or concerns. Due for next pap smear in 5 years.     Rob Sorto,   [facility], PGY-1

## 2020-09-19 ENCOUNTER — HOSPITAL ENCOUNTER (OUTPATIENT)
Facility: HOSPITAL | Age: 40
Discharge: HOME | End: 2020-09-23
Attending: EMERGENCY MEDICINE | Admitting: SPECIALIST
Payer: COMMERCIAL

## 2020-09-19 DIAGNOSIS — R07.9 CHEST PAIN: ICD-10-CM

## 2020-09-19 DIAGNOSIS — R13.19 ESOPHAGEAL DYSPHAGIA: Primary | ICD-10-CM

## 2020-09-19 DIAGNOSIS — R13.19 OTHER DYSPHAGIA: ICD-10-CM

## 2020-09-19 DIAGNOSIS — J38.00 VOCAL CORD PARALYSIS: ICD-10-CM

## 2020-09-19 DIAGNOSIS — K22.4 ESOPHAGEAL DYSMOTILITY: ICD-10-CM

## 2020-09-19 DIAGNOSIS — J38.00 VOCAL CORD PARESIS: ICD-10-CM

## 2020-09-19 LAB
ALBUMIN SERPL BCP-MCNC: 3.7 G/DL (ref 3.5–5.2)
ALP SERPL-CCNC: 86 U/L (ref 55–135)
ALT SERPL W/O P-5'-P-CCNC: 50 U/L (ref 10–44)
ANION GAP SERPL CALC-SCNC: 12 MMOL/L (ref 8–16)
AST SERPL-CCNC: 57 U/L (ref 10–40)
B-HCG UR QL: NEGATIVE
BASOPHILS # BLD AUTO: 0.05 K/UL (ref 0–0.2)
BASOPHILS NFR BLD: 1.1 % (ref 0–1.9)
BILIRUB SERPL-MCNC: 0.6 MG/DL (ref 0.1–1)
BUN SERPL-MCNC: 6 MG/DL (ref 6–20)
CALCIUM SERPL-MCNC: 9.3 MG/DL (ref 8.7–10.5)
CHLORIDE SERPL-SCNC: 100 MMOL/L (ref 95–110)
CO2 SERPL-SCNC: 22 MMOL/L (ref 23–29)
CREAT SERPL-MCNC: 0.8 MG/DL (ref 0.5–1.4)
CTP QC/QA: YES
DIFFERENTIAL METHOD: NORMAL
EOSINOPHIL # BLD AUTO: 0.1 K/UL (ref 0–0.5)
EOSINOPHIL NFR BLD: 2.6 % (ref 0–8)
ERYTHROCYTE [DISTWIDTH] IN BLOOD BY AUTOMATED COUNT: 14.2 % (ref 11.5–14.5)
EST. GFR  (AFRICAN AMERICAN): >60 ML/MIN/1.73 M^2
EST. GFR  (NON AFRICAN AMERICAN): >60 ML/MIN/1.73 M^2
ESTIMATED AVG GLUCOSE: 114 MG/DL (ref 68–131)
GLUCOSE SERPL-MCNC: 99 MG/DL (ref 70–110)
GROUP A STREP, MOLECULAR: NEGATIVE
HBA1C MFR BLD HPLC: 5.6 % (ref 4–5.6)
HCT VFR BLD AUTO: 38.9 % (ref 37–48.5)
HGB BLD-MCNC: 13.5 G/DL (ref 12–16)
IMM GRANULOCYTES # BLD AUTO: 0.01 K/UL (ref 0–0.04)
IMM GRANULOCYTES NFR BLD AUTO: 0.2 % (ref 0–0.5)
LYMPHOCYTES # BLD AUTO: 1 K/UL (ref 1–4.8)
LYMPHOCYTES NFR BLD: 22.5 % (ref 18–48)
MAGNESIUM SERPL-MCNC: 1.9 MG/DL (ref 1.6–2.6)
MCH RBC QN AUTO: 29 PG (ref 27–31)
MCHC RBC AUTO-ENTMCNC: 34.7 G/DL (ref 32–36)
MCV RBC AUTO: 84 FL (ref 82–98)
MONOCYTES # BLD AUTO: 0.7 K/UL (ref 0.3–1)
MONOCYTES NFR BLD: 14.3 % (ref 4–15)
NEUTROPHILS # BLD AUTO: 2.7 K/UL (ref 1.8–7.7)
NEUTROPHILS NFR BLD: 59.3 % (ref 38–73)
NRBC BLD-RTO: 0 /100 WBC
PHOSPHATE SERPL-MCNC: 3.1 MG/DL (ref 2.7–4.5)
PLATELET # BLD AUTO: 270 K/UL (ref 150–350)
PMV BLD AUTO: 11 FL (ref 9.2–12.9)
POCT GLUCOSE: 76 MG/DL (ref 70–110)
POTASSIUM SERPL-SCNC: 4.4 MMOL/L (ref 3.5–5.1)
PROT SERPL-MCNC: 8.2 G/DL (ref 6–8.4)
RBC # BLD AUTO: 4.65 M/UL (ref 4–5.4)
SARS-COV-2 RDRP RESP QL NAA+PROBE: NEGATIVE
SODIUM SERPL-SCNC: 134 MMOL/L (ref 136–145)
TROPONIN I SERPL DL<=0.01 NG/ML-MCNC: 0.01 NG/ML (ref 0–0.03)
TSH SERPL DL<=0.005 MIU/L-ACNC: 0.87 UIU/ML (ref 0.4–4)
WBC # BLD AUTO: 4.62 K/UL (ref 3.9–12.7)

## 2020-09-19 PROCEDURE — G0378 HOSPITAL OBSERVATION PER HR: HCPCS

## 2020-09-19 PROCEDURE — 84484 ASSAY OF TROPONIN QUANT: CPT

## 2020-09-19 PROCEDURE — 85025 COMPLETE CBC W/AUTO DIFF WBC: CPT

## 2020-09-19 PROCEDURE — 25000003 PHARM REV CODE 250: Performed by: STUDENT IN AN ORGANIZED HEALTH CARE EDUCATION/TRAINING PROGRAM

## 2020-09-19 PROCEDURE — 84100 ASSAY OF PHOSPHORUS: CPT

## 2020-09-19 PROCEDURE — 36415 COLL VENOUS BLD VENIPUNCTURE: CPT

## 2020-09-19 PROCEDURE — 87651 STREP A DNA AMP PROBE: CPT

## 2020-09-19 PROCEDURE — 99285 EMERGENCY DEPT VISIT HI MDM: CPT | Mod: 25

## 2020-09-19 PROCEDURE — 83036 HEMOGLOBIN GLYCOSYLATED A1C: CPT

## 2020-09-19 PROCEDURE — 80053 COMPREHEN METABOLIC PANEL: CPT

## 2020-09-19 PROCEDURE — 83735 ASSAY OF MAGNESIUM: CPT

## 2020-09-19 PROCEDURE — 25000003 PHARM REV CODE 250: Performed by: EMERGENCY MEDICINE

## 2020-09-19 PROCEDURE — 81025 URINE PREGNANCY TEST: CPT | Performed by: EMERGENCY MEDICINE

## 2020-09-19 PROCEDURE — 96361 HYDRATE IV INFUSION ADD-ON: CPT

## 2020-09-19 PROCEDURE — 63600175 PHARM REV CODE 636 W HCPCS: Performed by: EMERGENCY MEDICINE

## 2020-09-19 PROCEDURE — 63600175 PHARM REV CODE 636 W HCPCS: Performed by: STUDENT IN AN ORGANIZED HEALTH CARE EDUCATION/TRAINING PROGRAM

## 2020-09-19 PROCEDURE — 96372 THER/PROPH/DIAG INJ SC/IM: CPT | Performed by: EMERGENCY MEDICINE

## 2020-09-19 PROCEDURE — 84443 ASSAY THYROID STIM HORMONE: CPT

## 2020-09-19 PROCEDURE — 25500020 PHARM REV CODE 255: Performed by: EMERGENCY MEDICINE

## 2020-09-19 PROCEDURE — U0002 COVID-19 LAB TEST NON-CDC: HCPCS

## 2020-09-19 RX ORDER — SODIUM CHLORIDE 0.9 % (FLUSH) 0.9 %
5 SYRINGE (ML) INJECTION
Status: DISCONTINUED | OUTPATIENT
Start: 2020-09-19 | End: 2020-09-23 | Stop reason: HOSPADM

## 2020-09-19 RX ORDER — ENOXAPARIN SODIUM 100 MG/ML
40 INJECTION SUBCUTANEOUS EVERY 12 HOURS
Status: DISCONTINUED | OUTPATIENT
Start: 2020-09-19 | End: 2020-09-20

## 2020-09-19 RX ORDER — HYDROCODONE BITARTRATE AND ACETAMINOPHEN 5; 325 MG/1; MG/1
1 TABLET ORAL EVERY 6 HOURS PRN
Status: DISCONTINUED | OUTPATIENT
Start: 2020-09-19 | End: 2020-09-23 | Stop reason: HOSPADM

## 2020-09-19 RX ORDER — SUCRALFATE 1 G/10ML
1 SUSPENSION ORAL EVERY 6 HOURS
Status: DISCONTINUED | OUTPATIENT
Start: 2020-09-19 | End: 2020-09-23 | Stop reason: HOSPADM

## 2020-09-19 RX ORDER — AMOXICILLIN 250 MG
1 CAPSULE ORAL 2 TIMES DAILY
Status: DISCONTINUED | OUTPATIENT
Start: 2020-09-19 | End: 2020-09-23 | Stop reason: HOSPADM

## 2020-09-19 RX ORDER — CARBAMAZEPINE 100 MG/1
100 CAPSULE, EXTENDED RELEASE ORAL 2 TIMES DAILY
Status: DISCONTINUED | OUTPATIENT
Start: 2020-09-19 | End: 2020-09-23 | Stop reason: HOSPADM

## 2020-09-19 RX ORDER — INSULIN ASPART 100 [IU]/ML
1-10 INJECTION, SOLUTION INTRAVENOUS; SUBCUTANEOUS
Status: DISCONTINUED | OUTPATIENT
Start: 2020-09-19 | End: 2020-09-23 | Stop reason: HOSPADM

## 2020-09-19 RX ORDER — GLUCAGON 1 MG
1 KIT INJECTION
Status: DISCONTINUED | OUTPATIENT
Start: 2020-09-19 | End: 2020-09-23 | Stop reason: HOSPADM

## 2020-09-19 RX ORDER — TRIAMTERENE AND HYDROCHLOROTHIAZIDE 37.5; 25 MG/1; MG/1
1 CAPSULE ORAL EVERY MORNING
Status: DISCONTINUED | OUTPATIENT
Start: 2020-09-20 | End: 2020-09-21

## 2020-09-19 RX ORDER — FLUTICASONE FUROATE AND VILANTEROL 100; 25 UG/1; UG/1
1 POWDER RESPIRATORY (INHALATION) DAILY
Status: DISCONTINUED | OUTPATIENT
Start: 2020-09-20 | End: 2020-09-23 | Stop reason: HOSPADM

## 2020-09-19 RX ORDER — ACETAMINOPHEN 500 MG
1000 TABLET ORAL ONCE
Status: COMPLETED | OUTPATIENT
Start: 2020-09-19 | End: 2020-09-19

## 2020-09-19 RX ORDER — ONDANSETRON 8 MG/1
8 TABLET, ORALLY DISINTEGRATING ORAL EVERY 6 HOURS PRN
Status: DISCONTINUED | OUTPATIENT
Start: 2020-09-19 | End: 2020-09-23 | Stop reason: HOSPADM

## 2020-09-19 RX ORDER — IBUPROFEN 200 MG
16 TABLET ORAL
Status: DISCONTINUED | OUTPATIENT
Start: 2020-09-19 | End: 2020-09-23 | Stop reason: HOSPADM

## 2020-09-19 RX ORDER — IBUPROFEN 200 MG
24 TABLET ORAL
Status: DISCONTINUED | OUTPATIENT
Start: 2020-09-19 | End: 2020-09-23 | Stop reason: HOSPADM

## 2020-09-19 RX ORDER — ACETAMINOPHEN 325 MG/1
650 TABLET ORAL EVERY 8 HOURS PRN
Status: DISCONTINUED | OUTPATIENT
Start: 2020-09-19 | End: 2020-09-23 | Stop reason: HOSPADM

## 2020-09-19 RX ORDER — ACETAMINOPHEN 325 MG/1
650 TABLET ORAL EVERY 4 HOURS PRN
Status: DISCONTINUED | OUTPATIENT
Start: 2020-09-19 | End: 2020-09-23 | Stop reason: HOSPADM

## 2020-09-19 RX ORDER — PANTOPRAZOLE SODIUM 40 MG/1
40 TABLET, DELAYED RELEASE ORAL 2 TIMES DAILY
Status: DISCONTINUED | OUTPATIENT
Start: 2020-09-19 | End: 2020-09-23 | Stop reason: HOSPADM

## 2020-09-19 RX ORDER — SUCRALFATE 1 G/1
1 TABLET ORAL
Status: CANCELLED | OUTPATIENT
Start: 2020-09-19

## 2020-09-19 RX ADMIN — HYDROCODONE BITARTRATE AND ACETAMINOPHEN 1 TABLET: 5; 325 TABLET ORAL at 07:09

## 2020-09-19 RX ADMIN — ENOXAPARIN SODIUM 40 MG: 40 INJECTION SUBCUTANEOUS at 09:09

## 2020-09-19 RX ADMIN — SODIUM CHLORIDE, SODIUM LACTATE, POTASSIUM CHLORIDE, AND CALCIUM CHLORIDE 500 ML: .6; .31; .03; .02 INJECTION, SOLUTION INTRAVENOUS at 01:09

## 2020-09-19 RX ADMIN — ACETAMINOPHEN 1000 MG: 500 TABLET ORAL at 04:09

## 2020-09-19 RX ADMIN — PANTOPRAZOLE SODIUM 40 MG: 40 TABLET, DELAYED RELEASE ORAL at 09:09

## 2020-09-19 RX ADMIN — CARBAMAZEPINE 100 MG: 100 CAPSULE, EXTENDED RELEASE ORAL at 09:09

## 2020-09-19 RX ADMIN — LIDOCAINE HYDROCHLORIDE: 20 SOLUTION ORAL; TOPICAL at 01:09

## 2020-09-19 RX ADMIN — IOHEXOL 75 ML: 350 INJECTION, SOLUTION INTRAVENOUS at 12:09

## 2020-09-19 RX ADMIN — SUCRALFATE 1 G: 1 SUSPENSION ORAL at 05:09

## 2020-09-19 NOTE — HPI
Pt is a a 40 y.o. female with PMHx of tonsillectomy and HTN who presents to the ED with complaint of throat pain and dysphagia for 3 days. Dysphagia is associated with solids not liquids. She reports one episode of emesis after drinking a milkshake yesterday since then onset of wheezing. Symptoms are persistent but not worsening. Pt reports feelings of food getting stuck in her throat. She has had difficulty taking her daily medications. Associated symptoms include shortness of breath and feeling of muffled voice. She reports that this has never happened before and no sick contacts.  No associated rhinorrhea. Pt reported attempted to take her inhaler to alleviate symptoms but no improvement. No drooling and no issues with secretions/liquids.     In ED, CT soft tissue of neck showed possible enlargement and early inflammation of the right tonsillar pillar w/ prominent lymph nodes anterior to the distal trachea in superior mediastinum. concern for aspiration so CXR was performed. No sign of aspiration. Labs showed hyponatremia. COVID neg. ENT and Gi were consulted. . GI reported plan to scope patient and to place her on a liquid diet with PPI and liquid carafate for now.

## 2020-09-19 NOTE — H&P (VIEW-ONLY)
[facility]    CC: dysphagia    HPI 40 y.o. female with asthma and HTN presents to the hospital with acute, progressively worsening, solid food dysphagia with associated acid reflux. Patient states that in August she started having acid reflux. At that time she felt intermittently some difficulty in eating some solids. She was given Omeprazole with some improvement in her acid reflux but not complete resolution. She has been taking medications for her left ovarian cyst and she feels like sometimes the pills get stuck. Then over the past week, specifically Tuesday, she had persistent solid food dysphagia. She does not remember what she ate on Tuesday. On Wednesday she had a salad and vomited part of it up. She was able to tolerate chicken broth on Thursday. On Friday she tried a milkshake but felt like it didn't agree with her stomach and she subsequently regurgitated the milkshake up. At that time she felt like she couldn't breathe. She tried going outdoors for fresh air and taking puffs of her inhaler with minimal relief. She sent a message to her PCP who recommended she present to the ER due to respiratory distress and concern for possible aspiration. She is tolerating her secretions and water without any issue. She has never had an EGD before. Denies family hx of esophageal or gastric cancer. She denies unintentional weight loss.     Chart reviewed and summarized as above.     Past Medical History  Asthma  GERD  HTN  L ovarian cyst    Past Surgical History  Endometrial polypectomy    Social History  Former smoker  Denies IVDU  Denies alcohol use    Family History  No family hx of gastric ca    Review of Systems  General ROS: negative for chills, fever or weight loss  Psychological ROS: negative for hallucination, depression or suicidal ideation  Ophthalmic ROS: negative for blurry vision, photophobia or eye pain  ENT ROS: negative for epistaxis, sore throat or rhinorrhea  Respiratory ROS: no cough,  "positive for shortness of breath, or wheezing  Cardiovascular ROS: no chest pain or dyspnea on exertion  Gastrointestinal ROS: positive for solid food dysphagia, no abdominal pain, change in bowel habits, or black/ bloody stools  Genito-Urinary ROS: no dysuria, trouble voiding, or hematuria  Musculoskeletal ROS: negative for gait disturbance or muscular weakness  Neurological ROS: no syncope or seizures; no ataxia  Dermatological ROS: negative for pruritis, rash and jaundice    Physical Examination  BP (!) 141/73   Pulse 79   Temp 98.6 °F (37 °C) (Oral)   Resp 20   Ht 5' 9" (1.753 m)   Wt 127 kg (280 lb)   SpO2 100%   BMI 41.35 kg/m²   General appearance: alert, cooperative, no distress  HENT: Normocephalic, atraumatic, neck symmetrical, no nasal discharge, nasal voice, no edema of tonsils noted  Eyes: conjunctivae/corneas clear, PERRL, EOM's intact  Lungs: no audible wheeze, symmetric chest rise  Heart: normal rate, regular rhythm; palpable peripheral pulses  Abdomen: soft, non-tender; bowel sounds normoactive; no organomegaly  Extremities: extremities symmetric; no clubbing, cyanosis, or edema  Integument: Skin color, texture, turgor normal; no rashes; hair distrubution normal  Neurologic: Alert and oriented X 3, normal strength, normal coordination  Psychiatric: no pressured speech; normal affect; no evidence of impaired cognition     Labs:  Na 134  H/H 13.5/38.9    Imaging:  CT neck: possible enlargement and early inflammation of the right tonsillar pilar    I have personally reviewed these images    Assessment:   Ms. Caceres is a 40 year old woman with acid reflux with progressively worsening solid food dysphagia. She is currently able to tolerate water (evaluated at bedside with a bottle of water) and secretions. Symptoms may be secondary to pill esophagitis vs GERD with esophagitis     Plan:  - Recommend PPI BID daily and carafate (liquid) QID  - Clear liquid diet  - EGD planning; will plan on " biopsies for EoE or if classic ring present will plan on dilation  - If unable to tolerate water or secretions, please contact immediately     Will discuss with Dr. Langley. Please call with any questions or concerns.     Marilee West  [facility]  Cell [phone]

## 2020-09-19 NOTE — NURSING
Pt arrived from ED via stretcher. No s/s of distress noted. Provided pt with nonskid socks, ice and water, fall band placed to left wrist, dietary provided meal tray, vs obtained and documented.    No

## 2020-09-19 NOTE — PLAN OF CARE
VN cued into pt's room for introduction with pt's permission.  VN role explained and informed pt that VN would be working with bedside nurse and the rest of the care team.  Fall risk and bed alarm protocol education provided.  Instructed pt to call for assistance and agreeable.  Pt c/o abd pain. Refused pain med at this time. Pt crying regarding being in hospital . Emotional support provided. Allowed time for questions.   Will cont to be available as needed.

## 2020-09-19 NOTE — ASSESSMENT & PLAN NOTE
3 days of esophageal dysphagia associated with solids  CT soft tissue of neck showed possible enlargement and early inflammation of the right tonsillar pillar w/ prominent lymph nodes anterior to the distal trachea in superior mediastinum.  CXR no sign of aspiration    Plan:  - GI consult, recs appreciated  - PPI BID  - carafate (liquid) QID  - clear liquid diet  - swallow study pending  - plan for scope per GI

## 2020-09-19 NOTE — ED PROVIDER NOTES
COVID Statement  The [employer] and Lanre Rand, Governor of the [facility], have declared a State of Public Health Emergency due to the spread of a novel coronavirus and disease (COVID-19).  There is no currently accepted treatment except conservative measures and respiratory support if appropriate.  This has lead to significant resource capacity and potential delays in care.    Encounter Date: 2020    SCRIBE #1 NOTE: IKellie, am scribing for, and in the presence of, Dr. Pranav Crooks.       History     Chief Complaint   Patient presents with    Dysphagia     pt. c/o unable to eat or drink for 5 days secondary to difficulty swallowing. pt. mas muffled voice. associatd symptom has been sob     Time seen by provider: 11:07 AM on 2020    The patient is a 40 y.o. female who presents to the ED with complaint of throat pain and difficulty swallowing for the past 3 days. She has not been able to eat any solids and spits up anything she drinks. Symptoms are not worse today but have persisted. Associated symptoms include shortness of breath. She reports that this has never happened before.         The history is provided by the patient.     Review of patient's allergies indicates:  No Known Allergies  Past Medical History:   Diagnosis Date    Asthma     GERD with esophagitis     Hypertension     Left ovarian cyst      Past Surgical History:   Procedure Laterality Date    endometrial polypectomy       No family history on file.  Social History     Tobacco Use    Smoking status: Former Smoker     Quit date: 2020     Years since quittin.1   Substance Use Topics    Alcohol use: Yes     Alcohol/week: 0.0 standard drinks    Drug use: No     Review of Systems   Constitutional: Negative for chills and fever.   HENT: Positive for sore throat and trouble swallowing. Negative for rhinorrhea.    Eyes: Negative for visual disturbance.   Respiratory: Positive  for shortness of breath. Negative for cough.    Cardiovascular: Negative for chest pain.   Gastrointestinal: Negative for abdominal pain, diarrhea and vomiting.   Genitourinary: Negative for dysuria and hematuria.   Musculoskeletal: Negative for back pain.   Skin: Negative for rash.   Neurological: Negative for numbness and headaches.   Hematological: Negative for adenopathy.       Physical Exam     Initial Vitals [09/19/20 1048]   BP Pulse Resp Temp SpO2   (!) 140/104 109 20 98.6 °F (37 °C) 100 %      MAP       --         Physical Exam    Constitutional:  Non-toxic appearance. No distress.   HENT:   Head: Normocephalic and atraumatic.   No pharyngeal erythema or exudate. No respiratory distress.   Eyes: Conjunctivae and EOM are normal. Pupils are equal, round, and reactive to light. Right eye exhibits no nystagmus. Left eye exhibits no nystagmus.   Neck: Neck supple.   Cardiovascular: Regular rhythm, S1 normal and S2 normal.   No murmur heard.  Pulmonary/Chest: Breath sounds normal. No respiratory distress. She has no wheezes. She has no rales.   Abdominal: Soft. She exhibits no distension. There is no abdominal tenderness.   Musculoskeletal:      Comments: Tenderness to anterior neck.   Neurological: She is alert. No cranial nerve deficit. GCS eye subscore is 4. GCS verbal subscore is 5. GCS motor subscore is 6.   Skin: Skin is warm. No rash noted.   Psychiatric: She has a normal mood and affect. Her behavior is normal.         ED Course   Procedures  Labs Reviewed   COMPREHENSIVE METABOLIC PANEL - Abnormal; Notable for the following components:       Result Value    Sodium 134 (*)     CO2 22 (*)     AST 57 (*)     ALT 50 (*)     All other components within normal limits   GROUP A STREP, MOLECULAR   THROAT SCREEN, RAPID   CBC W/ AUTO DIFFERENTIAL   TSH   SARS-COV-2 RNA AMPLIFICATION, QUAL   POCT URINE PREGNANCY          Imaging Results          X-Ray Chest AP Portable (Final result)  Result time 09/19/20 15:29:00     Final result by Russ Moon MD (09/19/20 15:29:00)                 Impression:      1. No acute cardiopulmonary process, shallow inspiratory effort.      Electronically signed by: Russ Moon MD  Date:    09/19/2020  Time:    15:29             Narrative:    EXAMINATION:  XR CHEST AP PORTABLE    CLINICAL HISTORY:  Concern for aspiration;    TECHNIQUE:  Single frontal view of the chest was performed.    COMPARISON:  08/07/2020    FINDINGS:  The cardiomediastinal silhouette is not enlarged.  There is no pleural effusion.  The trachea is midline.  The lungs are symmetrically expanded bilaterally with mildly coarse interstitial attenuation, accentuated by shallow inspiratory effort..  No large focal consolidation seen.  There is no pneumothorax.  The osseous structures are remarkable for degenerative changes of the spine..  There is a questionable calcified granuloma or calcified lymph node along the medial aspect of the left upper lobe versus artifact.                               CT Soft Tissue Neck With Contrast (Final result)  Result time 09/19/20 12:43:30    Final result by Sean Love MD (09/19/20 12:43:30)                 Impression:      Possible enlargement and early inflammation of the right tonsillar pillar.    Prominent lymph nodes seen anterior to the distal trachea in the superior mediastinum.      Electronically signed by: Sean Love  Date:    09/19/2020  Time:    12:43             Narrative:    EXAMINATION:  CT SOFT TISSUE NECK WITH CONTRAST    CLINICAL HISTORY:  Neck mass, solitary, afebrile (Age => 16y);anterior neck pain, dyspnea, dysphagia;    TECHNIQUE:  Low dose axial images, coronal and sagittal reformations were obtained from the skull base to the lung bases following the intravenous administration of 75 mL of Omnipaque 350.    COMPARISON:  None.    FINDINGS:  Orbits, paranasal sinuses, and skull base: Normal.    Nasopharynx: Normal.    Suprahyoid neck: Normal oropharynx,  oral cavity, there appears to be a vague area of soft tissue fullness with a central region of hypo attenuation seen to the right of the midline on image number 36 of series 2.  This possibly represents inflammation of the tonsillar pillar however there does not appear to be evidence of enhancement peripherally that is well delineated.  Also as suggested the central aspect is not well delineated to the point that would suggest central necrosis.    More inferiorly in the superior mediastinal region there are lymph nodes anterior to the distal trachea.  And retropharyngeal space.    Infrahyoid neck: Normal larynx, hypopharynx, and supraglottis.    Thyroid: Normal.    Thoracic inlet: Normal lung apices and brachial plexus.    Lymph nodes Normal. No lymphadenopathy.    Vascular structures: Normal.    Lungs: Normal    Mediastinum: Normal    Other findings:                                 Medical Decision Making:   Differential Diagnosis:   Differential Diagnosis includes, but is not limited to:  Parveen's angina, epiglottitis, foreign body aspiration, retropharyngeal abscess, peritonsillar abscess, esophageal perforation, mediastinitis, esophagitis, sialolithiasis, parotitis, laryngitis, tracheitis, dental/periapical abscess, TMJ disorder, pneumonia, Streptococcal/bacterial pharyngitis, viral pharyngitis.    ED Management:  Discussed with ENT, no further intervention needed at this time  Discussed with Dr. Ayala, recommends endoscopy for esophageal dysmotility    Labs with mild dehydration  She will be admitted to [facility] .                                 Clinical Impression:     ICD-10-CM ICD-9-CM   1. Esophageal dysphagia  R13.10 787.20   2. Esophageal dysmotility  K22.4 530.5   3. Chest pain  R07.9 786.50                          ED Disposition Condition    Observation            Scribe attestation I, Pranav Crooks,  personally performed the services described in this documentation. All medical record entries made  by the scribe were at my direction and in my presence.  I have reviewed the chart and agree that the record reflects my personal performance and is accurate and complete. Pranav Crooks M.D. 10:22 AM09/20/2020    Portions of this note were dictated using voice recognition software and may contain dictation related errors in spelling/grammar/syntax not found on text review                    Pranav Crooks Jr., MD  09/20/20 1022

## 2020-09-19 NOTE — ED NOTES
Pt transported to room 421 via wheelchair by patient escort w/ face sheet, stickers, personal towel, and one bag pt belongings.

## 2020-09-19 NOTE — H&P
[facility] Medicine  History & Physical    Patient Name: Elva Caceres  MRN: [id number]  Admission Date: 9/19/2020  Attending Physician: Pranav Stroud MD   Primary Care Provider: Rob Sorto DO         Patient information was obtained from patient, past medical records and ER records.     Subjective:     Principal Problem:Esophageal dysmotility    Chief Complaint:   Chief Complaint   Patient presents with    Dysphagia     pt. c/o unable to eat or drink for 5 days secondary to difficulty swallowing. pt. mas muffled voice. associatd symptom has been sob        HPI: Pt is a a 40 y.o. female with PMHx of tonsillectomy and HTN who presents to the ED with complaint of throat pain and dysphagia for 3 days. Dysphagia is associated with solids not liquids. She reports one episode of emesis after drinking a milkshake yesterday since then onset of wheezing. Symptoms are persistent but not worsening. Pt reports feelings of food getting stuck in her throat. She has had difficulty taking her daily medications. Associated symptoms include shortness of breath and feeling of muffled voice. She reports that this has never happened before and no sick contacts.  No associated rhinorrhea. Pt reported attempted to take her inhaler to alleviate symptoms but no improvement. No drooling and no issues with secretions/liquids.     In ED, CT soft tissue of neck showed possible enlargement and early inflammation of the right tonsillar pillar w/ prominent lymph nodes anterior to the distal trachea in superior mediastinum. concern for aspiration so CXR was performed. No sign of aspiration. Labs showed hyponatremia. COVID neg. ENT and Gi were consulted. ENT reported no further  Evaluation. GI reported plan to scope patient and to place her on a liquid diet with PPI and liquid carafate for now.            Past Medical History:   Diagnosis Date    Asthma     GERD with esophagitis     Hypertension      Left ovarian cyst        Past Surgical History:   Procedure Laterality Date    endometrial polypectomy         Review of patient's allergies indicates:  No Known Allergies    No current facility-administered medications on file prior to encounter.      Current Outpatient Medications on File Prior to Encounter   Medication Sig    acetaminophen (TYLENOL) 325 MG tablet Take 1 tablet (325 mg total) by mouth every 6 (six) hours as needed for Pain.    BREO ELLIPTA 100-25 mcg/dose diskus inhaler TAKE 1 PUFF BY MOUTH EVERY DAY    carBAMazepine (TEGRETOL XR) 100 MG 12 hr tablet Take 1 tablet (100 mg total) by mouth 2 (two) times daily.    diclofenac sodium (VOLTAREN) 1 % Gel Apply 2 g topically 4 (four) times daily. (Patient not taking: Reported on 2020)    meloxicam (MOBIC) 7.5 MG tablet Take 1 tablet (7.5 mg total) by mouth daily as needed for Pain. Can take 1-2 tabs per day as needed for pain. Do not take more than 2 tabs per day.    omeprazole (PRILOSEC) 20 MG capsule Take 1 capsule (20 mg total) by mouth 2 (two) times a day.    ondansetron (ZOFRAN) 8 MG tablet Take 1 tablet (8 mg total) by mouth every 8 (eight) hours as needed for Nausea.    propylene glycol (SYSTANE BALANCE) 0.6 % Drop Apply 1 drop to eye 4 (four) times daily as needed. (Patient not taking: Reported on 2020)    triamterene-hydrochlorothiazide 37.5-25 mg (DYAZIDE) 37.5-25 mg per capsule TAKE ONE CAPSULE BY MOUTH IN THE MORNING    valACYclovir (VALTREX) 1000 MG tablet Take 1 tablet (1,000 mg total) by mouth 3 (three) times daily. for 7 days (Patient not taking: Reported on 2020)     Family History     None        Tobacco Use    Smoking status: Former Smoker     Quit date: 2020     Years since quittin.1   Substance and Sexual Activity    Alcohol use: Yes     Alcohol/week: 0.0 standard drinks    Drug use: No    Sexual activity: Not on file     Review of Systems   Constitutional: Negative for chills and fever.   HENT:  Negative for drooling, postnasal drip and rhinorrhea.    Eyes: Negative for discharge and redness.   Respiratory: Positive for shortness of breath. Negative for apnea, choking and wheezing.    Cardiovascular: Negative for chest pain and palpitations.   Gastrointestinal: Negative for abdominal pain, diarrhea, nausea and vomiting.   Genitourinary: Negative for dysuria, urgency and vaginal pain.   Musculoskeletal: Negative for gait problem and myalgias.   Neurological: Negative for dizziness, weakness and light-headedness.   Psychiatric/Behavioral: Negative for confusion. The patient is not nervous/anxious.      Objective:     Vital Signs (Most Recent):  Temp: 98.6 °F (37 °C) (09/19/20 1048)  Pulse: 87 (09/19/20 1602)  Resp: 20 (09/19/20 1048)  BP: (!) 161/104 (09/19/20 1602)  SpO2: 100 % (09/19/20 1602) Vital Signs (24h Range):  Temp:  [98.6 °F (37 °C)] 98.6 °F (37 °C)  Pulse:  [] 87  Resp:  [20] 20  SpO2:  [100 %] 100 %  BP: (140-161)/() 161/104     Weight: 127 kg (280 lb)  Body mass index is 41.35 kg/m².    Physical Exam  Vitals signs and nursing note reviewed.   Constitutional:       Appearance: Normal appearance. She is obese.   HENT:      Head: Normocephalic and atraumatic.      Mouth/Throat:      Mouth: Mucous membranes are moist.      Comments: Mild erythema and swelling in palatopharyngeal arch  tonsills removed  Eyes:      Conjunctiva/sclera: Conjunctivae normal.      Pupils: Pupils are equal, round, and reactive to light.   Neck:      Musculoskeletal: Normal range of motion. Muscular tenderness present.      Comments: Left lateral swelling  And tenderness to palpation   Cardiovascular:      Rate and Rhythm: Normal rate and regular rhythm.      Pulses: Normal pulses.      Heart sounds: Normal heart sounds. No murmur.   Pulmonary:      Effort: Pulmonary effort is normal. No respiratory distress.      Breath sounds: Normal breath sounds. No wheezing.   Chest:      Chest wall: No tenderness.    Abdominal:      General: Abdomen is flat. There is no distension.      Palpations: Abdomen is soft.      Tenderness: There is no abdominal tenderness.   Skin:     General: Skin is warm.      Capillary Refill: Capillary refill takes less than 2 seconds.      Coloration: Skin is not jaundiced.      Findings: No erythema.   Neurological:      General: No focal deficit present.      Mental Status: She is alert and oriented to person, place, and time.   Psychiatric:         Mood and Affect: Mood normal.         Behavior: Behavior normal.           CRANIAL NERVES     CN III, IV, VI   Pupils are equal, round, and reactive to light.       Significant Labs:   CBC:   Recent Labs   Lab 09/19/20  1118   WBC 4.62   HGB 13.5   HCT 38.9        CMP:   Recent Labs   Lab 09/19/20  1118   *   K 4.4      CO2 22*   GLU 99   BUN 6   CREATININE 0.8   CALCIUM 9.3   PROT 8.2   ALBUMIN 3.7   BILITOT 0.6   ALKPHOS 86   AST 57*   ALT 50*   ANIONGAP 12   EGFRNONAA >60     Magnesium: No results for input(s): MG in the last 48 hours.  TSH:   Recent Labs   Lab 09/19/20  1118   TSH 0.870       Significant Imaging: I have reviewed all pertinent imaging results/findings within the past 24 hours.    Assessment/Plan:     * Esophageal dysmotility  3 days of esophageal dysphagia associated with solids  CT soft tissue of neck showed possible enlargement and early inflammation of the right tonsillar pillar w/ prominent lymph nodes anterior to the distal trachea in superior mediastinum.  CXR no sign of aspiration    Plan:  - GI consult, recs appreciated  - PPI BID  - carafate (liquid) QID  - clear liquid diet  - swallow study pending  - plan for scope per GI            Gastroesophageal reflux disease with esophagitis        VTE Risk Mitigation (From admission, onward)    None             Bernabe Harrison MD  Department of Hospital Medicine   [facility]

## 2020-09-19 NOTE — CONSULTS
[facility]    CC: dysphagia    HPI 40 y.o. female with asthma and HTN presents to the hospital with acute, progressively worsening, solid food dysphagia with associated acid reflux. Patient states that in August she started having acid reflux. At that time she felt intermittently some difficulty in eating some solids. She was given Omeprazole with some improvement in her acid reflux but not complete resolution. She has been taking medications for her left ovarian cyst and she feels like sometimes the pills get stuck. Then over the past week, specifically Tuesday, she had persistent solid food dysphagia. She does not remember what she ate on Tuesday. On Wednesday she had a salad and vomited part of it up. She was able to tolerate chicken broth on Thursday. On Friday she tried a milkshake but felt like it didn't agree with her stomach and she subsequently regurgitated the milkshake up. At that time she felt like she couldn't breathe. She tried going outdoors for fresh air and taking puffs of her inhaler with minimal relief. She sent a message to her PCP who recommended she present to the ER due to respiratory distress and concern for possible aspiration. She is tolerating her secretions and water without any issue. She has never had an EGD before. Denies family hx of esophageal or gastric cancer. She denies unintentional weight loss.     Chart reviewed and summarized as above.     Past Medical History  Asthma  GERD  HTN  L ovarian cyst    Past Surgical History  Endometrial polypectomy    Social History  Former smoker  Denies IVDU  Denies alcohol use    Family History  No family hx of gastric ca    Review of Systems  General ROS: negative for chills, fever or weight loss  Psychological ROS: negative for hallucination, depression or suicidal ideation  Ophthalmic ROS: negative for blurry vision, photophobia or eye pain  ENT ROS: negative for epistaxis, sore throat or rhinorrhea  Respiratory ROS: no cough,  "positive for shortness of breath, or wheezing  Cardiovascular ROS: no chest pain or dyspnea on exertion  Gastrointestinal ROS: positive for solid food dysphagia, no abdominal pain, change in bowel habits, or black/ bloody stools  Genito-Urinary ROS: no dysuria, trouble voiding, or hematuria  Musculoskeletal ROS: negative for gait disturbance or muscular weakness  Neurological ROS: no syncope or seizures; no ataxia  Dermatological ROS: negative for pruritis, rash and jaundice    Physical Examination  BP (!) 141/73   Pulse 79   Temp 98.6 °F (37 °C) (Oral)   Resp 20   Ht 5' 9" (1.753 m)   Wt 127 kg (280 lb)   SpO2 100%   BMI 41.35 kg/m²   General appearance: alert, cooperative, no distress  HENT: Normocephalic, atraumatic, neck symmetrical, no nasal discharge, nasal voice, no edema of tonsils noted  Eyes: conjunctivae/corneas clear, PERRL, EOM's intact  Lungs: no audible wheeze, symmetric chest rise  Heart: normal rate, regular rhythm; palpable peripheral pulses  Abdomen: soft, non-tender; bowel sounds normoactive; no organomegaly  Extremities: extremities symmetric; no clubbing, cyanosis, or edema  Integument: Skin color, texture, turgor normal; no rashes; hair distrubution normal  Neurologic: Alert and oriented X 3, normal strength, normal coordination  Psychiatric: no pressured speech; normal affect; no evidence of impaired cognition     Labs:  Na 134  H/H 13.5/38.9    Imaging:  CT neck: possible enlargement and early inflammation of the right tonsillar pilar    I have personally reviewed these images    Assessment:   Ms. Caceres is a 40 year old woman with acid reflux with progressively worsening solid food dysphagia. She is currently able to tolerate water (evaluated at bedside with a bottle of water) and secretions. Symptoms may be secondary to pill esophagitis vs GERD with esophagitis     Plan:  - Recommend PPI BID daily and carafate (liquid) QID  - Clear liquid diet  - EGD planning; will plan on " biopsies for EoE or if classic ring present will plan on dilation  - If unable to tolerate water or secretions, please contact immediately     Will discuss with Dr. Langley. Please call with any questions or concerns.     Marilee West  [facility]  Cell [phone]

## 2020-09-19 NOTE — SUBJECTIVE & OBJECTIVE
Past Medical History:   Diagnosis Date    Asthma     GERD with esophagitis     Hypertension     Left ovarian cyst        Past Surgical History:   Procedure Laterality Date    endometrial polypectomy         Review of patient's allergies indicates:  No Known Allergies    No current facility-administered medications on file prior to encounter.      Current Outpatient Medications on File Prior to Encounter   Medication Sig    acetaminophen (TYLENOL) 325 MG tablet Take 1 tablet (325 mg total) by mouth every 6 (six) hours as needed for Pain.    BREO ELLIPTA 100-25 mcg/dose diskus inhaler TAKE 1 PUFF BY MOUTH EVERY DAY    carBAMazepine (TEGRETOL XR) 100 MG 12 hr tablet Take 1 tablet (100 mg total) by mouth 2 (two) times daily.    diclofenac sodium (VOLTAREN) 1 % Gel Apply 2 g topically 4 (four) times daily. (Patient not taking: Reported on 2020)    meloxicam (MOBIC) 7.5 MG tablet Take 1 tablet (7.5 mg total) by mouth daily as needed for Pain. Can take 1-2 tabs per day as needed for pain. Do not take more than 2 tabs per day.    omeprazole (PRILOSEC) 20 MG capsule Take 1 capsule (20 mg total) by mouth 2 (two) times a day.    ondansetron (ZOFRAN) 8 MG tablet Take 1 tablet (8 mg total) by mouth every 8 (eight) hours as needed for Nausea.    propylene glycol (SYSTANE BALANCE) 0.6 % Drop Apply 1 drop to eye 4 (four) times daily as needed. (Patient not taking: Reported on 2020)    triamterene-hydrochlorothiazide 37.5-25 mg (DYAZIDE) 37.5-25 mg per capsule TAKE ONE CAPSULE BY MOUTH IN THE MORNING    valACYclovir (VALTREX) 1000 MG tablet Take 1 tablet (1,000 mg total) by mouth 3 (three) times daily. for 7 days (Patient not taking: Reported on 2020)     Family History     None        Tobacco Use    Smoking status: Former Smoker     Quit date: 2020     Years since quittin.1   Substance and Sexual Activity    Alcohol use: Yes     Alcohol/week: 0.0 standard drinks    Drug use: No    Sexual  activity: Not on file     Review of Systems   Constitutional: Negative for chills and fever.   HENT: Negative for drooling, postnasal drip and rhinorrhea.    Eyes: Negative for discharge and redness.   Respiratory: Positive for shortness of breath. Negative for apnea, choking and wheezing.    Cardiovascular: Negative for chest pain and palpitations.   Gastrointestinal: Negative for abdominal pain, diarrhea, nausea and vomiting.   Genitourinary: Negative for dysuria, urgency and vaginal pain.   Musculoskeletal: Negative for gait problem and myalgias.   Neurological: Negative for dizziness, weakness and light-headedness.   Psychiatric/Behavioral: Negative for confusion. The patient is not nervous/anxious.      Objective:     Vital Signs (Most Recent):  Temp: 98.6 °F (37 °C) (09/19/20 1048)  Pulse: 87 (09/19/20 1602)  Resp: 20 (09/19/20 1048)  BP: (!) 161/104 (09/19/20 1602)  SpO2: 100 % (09/19/20 1602) Vital Signs (24h Range):  Temp:  [98.6 °F (37 °C)] 98.6 °F (37 °C)  Pulse:  [] 87  Resp:  [20] 20  SpO2:  [100 %] 100 %  BP: (140-161)/() 161/104     Weight: 127 kg (280 lb)  Body mass index is 41.35 kg/m².    Physical Exam  Vitals signs and nursing note reviewed.   Constitutional:       Appearance: Normal appearance. She is obese.   HENT:      Head: Normocephalic and atraumatic.      Mouth/Throat:      Mouth: Mucous membranes are moist.      Comments: Mild erythema and swelling in palatopharyngeal arch  tonsills removed  Eyes:      Conjunctiva/sclera: Conjunctivae normal.      Pupils: Pupils are equal, round, and reactive to light.   Neck:      Musculoskeletal: Normal range of motion. Muscular tenderness present.      Comments: Left lateral swelling  And tenderness to palpation   Cardiovascular:      Rate and Rhythm: Normal rate and regular rhythm.      Pulses: Normal pulses.      Heart sounds: Normal heart sounds. No murmur.   Pulmonary:      Effort: Pulmonary effort is normal. No respiratory distress.       Breath sounds: Normal breath sounds. No wheezing.   Chest:      Chest wall: No tenderness.   Abdominal:      General: Abdomen is flat. There is no distension.      Palpations: Abdomen is soft.      Tenderness: There is no abdominal tenderness.   Skin:     General: Skin is warm.      Capillary Refill: Capillary refill takes less than 2 seconds.      Coloration: Skin is not jaundiced.      Findings: No erythema.   Neurological:      General: No focal deficit present.      Mental Status: She is alert and oriented to person, place, and time.   Psychiatric:         Mood and Affect: Mood normal.         Behavior: Behavior normal.           CRANIAL NERVES     CN III, IV, VI   Pupils are equal, round, and reactive to light.       Significant Labs:   CBC:   Recent Labs   Lab 09/19/20  1118   WBC 4.62   HGB 13.5   HCT 38.9        CMP:   Recent Labs   Lab 09/19/20  1118   *   K 4.4      CO2 22*   GLU 99   BUN 6   CREATININE 0.8   CALCIUM 9.3   PROT 8.2   ALBUMIN 3.7   BILITOT 0.6   ALKPHOS 86   AST 57*   ALT 50*   ANIONGAP 12   EGFRNONAA >60     Magnesium: No results for input(s): MG in the last 48 hours.  TSH:   Recent Labs   Lab 09/19/20  1118   TSH 0.870       Significant Imaging: I have reviewed all pertinent imaging results/findings within the past 24 hours.

## 2020-09-19 NOTE — TELEPHONE ENCOUNTER
Pt had an episode of difficulty swallowing followed by emesis and then respiratory distress. Per pt she was unable to breath, felt like her throat was closing and has since developed a wheeze and increased effort with breathing. Her LLQ pain has continued to be constant and is not improving. She has had difficulty taking her daily and pain medications as she cannot eat food and tends to throw them back up. Currently feels like there is a lump in her throat and her airway is narrowed. Has been unable to tolerate liquid intake, aside from water, for about 2 days now.     New onset of wheeze s/p milk shake is very concerning for aspiration. I emphasized to the patient multiple times that I recommend she be evaluated in the emergency department as she has had significant decline in oral intake and now there is a concern for respiratory compromise and aspiration which could be deadly. Pt was unwilling to go to the ED. Signs and symptoms of worsening respiratory distress and need for immediate visit to the ED were discussed and she verbalized understanding. I also repeatedly emphasized that I am no longer able to prescribe her medications or help treat her until I can better evaluate her to determine what is going on. She understood and plans to go to the ER if symptoms worsen. Was able to get pt to agree to clinic visit with one of my colleagues on Monday morning if she does not go to the ER this weekend as recommended.     Rob Sorto,   [facility], PGY-1

## 2020-09-20 PROBLEM — R13.19 OTHER DYSPHAGIA: Status: ACTIVE | Noted: 2020-09-20

## 2020-09-20 PROBLEM — J38.00 VOCAL CORD PARESIS: Status: ACTIVE | Noted: 2020-09-20

## 2020-09-20 LAB
ALBUMIN SERPL BCP-MCNC: 3.4 G/DL (ref 3.5–5.2)
ALP SERPL-CCNC: 79 U/L (ref 55–135)
ALT SERPL W/O P-5'-P-CCNC: 45 U/L (ref 10–44)
ANION GAP SERPL CALC-SCNC: 10 MMOL/L (ref 8–16)
AST SERPL-CCNC: 42 U/L (ref 10–40)
BASOPHILS # BLD AUTO: 0.05 K/UL (ref 0–0.2)
BASOPHILS NFR BLD: 1.1 % (ref 0–1.9)
BILIRUB SERPL-MCNC: 0.6 MG/DL (ref 0.1–1)
BUN SERPL-MCNC: 5 MG/DL (ref 6–20)
CALCIUM SERPL-MCNC: 8.9 MG/DL (ref 8.7–10.5)
CHLORIDE SERPL-SCNC: 100 MMOL/L (ref 95–110)
CO2 SERPL-SCNC: 24 MMOL/L (ref 23–29)
CREAT SERPL-MCNC: 0.7 MG/DL (ref 0.5–1.4)
DIFFERENTIAL METHOD: NORMAL
EOSINOPHIL # BLD AUTO: 0.1 K/UL (ref 0–0.5)
EOSINOPHIL NFR BLD: 2.2 % (ref 0–8)
ERYTHROCYTE [DISTWIDTH] IN BLOOD BY AUTOMATED COUNT: 14.3 % (ref 11.5–14.5)
EST. GFR  (AFRICAN AMERICAN): >60 ML/MIN/1.73 M^2
EST. GFR  (NON AFRICAN AMERICAN): >60 ML/MIN/1.73 M^2
GLUCOSE SERPL-MCNC: 99 MG/DL (ref 70–110)
HCT VFR BLD AUTO: 37.8 % (ref 37–48.5)
HGB BLD-MCNC: 12.8 G/DL (ref 12–16)
IMM GRANULOCYTES # BLD AUTO: 0.01 K/UL (ref 0–0.04)
IMM GRANULOCYTES NFR BLD AUTO: 0.2 % (ref 0–0.5)
LYMPHOCYTES # BLD AUTO: 1.1 K/UL (ref 1–4.8)
LYMPHOCYTES NFR BLD: 23.1 % (ref 18–48)
MAGNESIUM SERPL-MCNC: 1.9 MG/DL (ref 1.6–2.6)
MCH RBC QN AUTO: 28.4 PG (ref 27–31)
MCHC RBC AUTO-ENTMCNC: 33.9 G/DL (ref 32–36)
MCV RBC AUTO: 84 FL (ref 82–98)
MONOCYTES # BLD AUTO: 0.7 K/UL (ref 0.3–1)
MONOCYTES NFR BLD: 14.4 % (ref 4–15)
NEUTROPHILS # BLD AUTO: 2.7 K/UL (ref 1.8–7.7)
NEUTROPHILS NFR BLD: 59 % (ref 38–73)
NRBC BLD-RTO: 0 /100 WBC
PHOSPHATE SERPL-MCNC: 3 MG/DL (ref 2.7–4.5)
PLATELET # BLD AUTO: 271 K/UL (ref 150–350)
PMV BLD AUTO: 10.1 FL (ref 9.2–12.9)
POCT GLUCOSE: 71 MG/DL (ref 70–110)
POCT GLUCOSE: 74 MG/DL (ref 70–110)
POCT GLUCOSE: 74 MG/DL (ref 70–110)
POCT GLUCOSE: 83 MG/DL (ref 70–110)
POTASSIUM SERPL-SCNC: 4.1 MMOL/L (ref 3.5–5.1)
PROT SERPL-MCNC: 7.1 G/DL (ref 6–8.4)
RBC # BLD AUTO: 4.51 M/UL (ref 4–5.4)
SODIUM SERPL-SCNC: 134 MMOL/L (ref 136–145)
WBC # BLD AUTO: 4.59 K/UL (ref 3.9–12.7)

## 2020-09-20 PROCEDURE — 25000003 PHARM REV CODE 250: Performed by: STUDENT IN AN ORGANIZED HEALTH CARE EDUCATION/TRAINING PROGRAM

## 2020-09-20 PROCEDURE — G0378 HOSPITAL OBSERVATION PER HR: HCPCS

## 2020-09-20 PROCEDURE — 97161 PT EVAL LOW COMPLEX 20 MIN: CPT

## 2020-09-20 PROCEDURE — 99203 OFFICE O/P NEW LOW 30 MIN: CPT | Mod: 25,,, | Performed by: OTOLARYNGOLOGY

## 2020-09-20 PROCEDURE — 94640 AIRWAY INHALATION TREATMENT: CPT

## 2020-09-20 PROCEDURE — 84100 ASSAY OF PHOSPHORUS: CPT

## 2020-09-20 PROCEDURE — 31575 DIAGNOSTIC LARYNGOSCOPY: CPT | Mod: ,,, | Performed by: OTOLARYNGOLOGY

## 2020-09-20 PROCEDURE — 36415 COLL VENOUS BLD VENIPUNCTURE: CPT

## 2020-09-20 PROCEDURE — 31575 PR LARYNGOSCOPY, FLEXIBLE; DIAGNOSTIC: ICD-10-PCS | Mod: ,,, | Performed by: OTOLARYNGOLOGY

## 2020-09-20 PROCEDURE — 85025 COMPLETE CBC W/AUTO DIFF WBC: CPT

## 2020-09-20 PROCEDURE — 63600175 PHARM REV CODE 636 W HCPCS: Performed by: STUDENT IN AN ORGANIZED HEALTH CARE EDUCATION/TRAINING PROGRAM

## 2020-09-20 PROCEDURE — 94761 N-INVAS EAR/PLS OXIMETRY MLT: CPT

## 2020-09-20 PROCEDURE — 80053 COMPREHEN METABOLIC PANEL: CPT

## 2020-09-20 PROCEDURE — 25000242 PHARM REV CODE 250 ALT 637 W/ HCPCS: Performed by: STUDENT IN AN ORGANIZED HEALTH CARE EDUCATION/TRAINING PROGRAM

## 2020-09-20 PROCEDURE — 83735 ASSAY OF MAGNESIUM: CPT

## 2020-09-20 PROCEDURE — 99203 PR OFFICE/OUTPT VISIT, NEW, LEVL III, 30-44 MIN: ICD-10-PCS | Mod: 25,,, | Performed by: OTOLARYNGOLOGY

## 2020-09-20 RX ORDER — ENOXAPARIN SODIUM 100 MG/ML
40 INJECTION SUBCUTANEOUS
Status: DISCONTINUED | OUTPATIENT
Start: 2020-09-21 | End: 2020-09-23 | Stop reason: HOSPADM

## 2020-09-20 RX ADMIN — SUCRALFATE 1 G: 1 SUSPENSION ORAL at 05:09

## 2020-09-20 RX ADMIN — SUCRALFATE 1 G: 1 SUSPENSION ORAL at 11:09

## 2020-09-20 RX ADMIN — DOCUSATE SODIUM - SENNOSIDES 1 TABLET: 50; 8.6 TABLET, FILM COATED ORAL at 08:09

## 2020-09-20 RX ADMIN — ENOXAPARIN SODIUM 40 MG: 40 INJECTION SUBCUTANEOUS at 08:09

## 2020-09-20 RX ADMIN — CARBAMAZEPINE 100 MG: 100 CAPSULE, EXTENDED RELEASE ORAL at 08:09

## 2020-09-20 RX ADMIN — PANTOPRAZOLE SODIUM 40 MG: 40 TABLET, DELAYED RELEASE ORAL at 08:09

## 2020-09-20 RX ADMIN — HYDROCODONE BITARTRATE AND ACETAMINOPHEN 1 TABLET: 5; 325 TABLET ORAL at 08:09

## 2020-09-20 RX ADMIN — FLUTICASONE FUROATE AND VILANTEROL TRIFENATATE 1 PUFF: 100; 25 POWDER RESPIRATORY (INHALATION) at 08:09

## 2020-09-20 RX ADMIN — HYDROCODONE BITARTRATE AND ACETAMINOPHEN 1 TABLET: 5; 325 TABLET ORAL at 05:09

## 2020-09-20 RX ADMIN — TRIAMTERENE AND HYDROCHLOROTHIAZIDE 1 CAPSULE: 25; 37.5 CAPSULE ORAL at 08:09

## 2020-09-20 NOTE — PLAN OF CARE
Problem: Adult Inpatient Plan of Care  Goal: Plan of Care Review  Outcome: Ongoing, Progressing  Labs, orders, care plan and notes reviewed

## 2020-09-20 NOTE — CODE/ RAPID DOCUMENTATION
Sitting on side of bed. Complaining of choking on water and  shortness of breath. O2 2l nc placed with pulseox 99%.

## 2020-09-20 NOTE — PT/OT/SLP PROGRESS
Occupational Therapy      Patient Name:  Elva Caceres   MRN:  [id number]    Attempts made 11:57 am and 4:16 PM Patient not seen today secondary to MD in room upon first attempt, nsg hold upon second attempt. S/P MET just prior and nsg reports pt still SOB. Will follow-up as able.    Marylin Knox OT  9/20/2020

## 2020-09-20 NOTE — ASSESSMENT & PLAN NOTE
3 days of esophageal dysphagia associated with solids  CT soft tissue of neck showed possible enlargement and early inflammation of the right tonsillar pillar w/ prominent lymph nodes anterior to the distal trachea in superior mediastinum.  CXR no sign of aspiration    Plan:  - GI consult, recs appreciated  - PPI BID  - carafate (liquid) QID  - clear liquid diet  - swallow study pending  - plan for scope per GI on Monday

## 2020-09-20 NOTE — PLAN OF CARE
Care plan reviewed with pt. Vital sign monitoring in place. Glucose monitoring in place. Medications administered as ordered. Safety maintained, bed at lowest position, wheels locked, nonski socks on, call light within reach. Pt is to be NPO at midnight tonight for GI procedure.

## 2020-09-20 NOTE — SUBJECTIVE & OBJECTIVE
Interval History: Patient seen and examined at bedside. No acute events overnight. Patient was calm and responded appropriately. She denied any worsening complaints. Patient denied CP, SOB, Cough, HA, Dizziness, F/C, N/V, Abd pain, numbness/tingling, GI or  changes.    She has been tolerating Clear Liquid Diet.     Review of Systems   Constitutional: Negative for chills and fever.   HENT: Negative for drooling, postnasal drip and rhinorrhea.    Eyes: Negative for discharge and redness.   Respiratory: Negative for apnea, choking, shortness of breath and wheezing.    Cardiovascular: Negative for chest pain and palpitations.   Gastrointestinal: Negative for abdominal pain, diarrhea, nausea and vomiting.   Genitourinary: Negative for dysuria, urgency and vaginal pain.   Musculoskeletal: Negative for gait problem and myalgias.   Neurological: Negative for dizziness, weakness and light-headedness.   Psychiatric/Behavioral: Negative for confusion. The patient is not nervous/anxious.      Objective:     Vital Signs (Most Recent):  Temp: 97.9 °F (36.6 °C) (09/20/20 0345)  Pulse: 80 (09/20/20 0345)  Resp: 18 (09/20/20 0345)  BP: (!) 144/63 (09/20/20 0345)  SpO2: 99 % (09/20/20 0345) Vital Signs (24h Range):  Temp:  [96.6 °F (35.9 °C)-98.7 °F (37.1 °C)] 97.9 °F (36.6 °C)  Pulse:  [] 80  Resp:  [16-20] 18  SpO2:  [99 %-100 %] 99 %  BP: (140-165)/() 144/63     Weight: 127.6 kg (281 lb 6.4 oz)  Body mass index is 41.56 kg/m².  No intake or output data in the 24 hours ending 09/20/20 0732   Physical Exam  Vitals signs and nursing note reviewed.   Constitutional:       Appearance: Normal appearance. She is obese.   HENT:      Head: Normocephalic and atraumatic.      Mouth/Throat:      Mouth: Mucous membranes are moist.      Comments: Mild erythema and swelling in palatopharyngeal arch  tonsills removed  Eyes:      Conjunctiva/sclera: Conjunctivae normal.      Pupils: Pupils are equal, round, and reactive to light.    Neck:      Musculoskeletal: Normal range of motion.      Comments: Left lateral swelling  And tenderness to palpation   Cardiovascular:      Rate and Rhythm: Normal rate and regular rhythm.      Pulses: Normal pulses.      Heart sounds: Normal heart sounds. No murmur.   Pulmonary:      Effort: Pulmonary effort is normal. No respiratory distress.      Breath sounds: Normal breath sounds. No wheezing.   Chest:      Chest wall: No tenderness.   Abdominal:      General: Abdomen is flat. There is no distension.      Palpations: Abdomen is soft.      Tenderness: There is no abdominal tenderness.   Skin:     General: Skin is warm.      Capillary Refill: Capillary refill takes less than 2 seconds.      Coloration: Skin is not jaundiced.      Findings: No erythema.   Neurological:      General: No focal deficit present.      Mental Status: She is alert and oriented to person, place, and time.   Psychiatric:         Mood and Affect: Mood normal.         Behavior: Behavior normal.         Significant Labs:   A1C:   Recent Labs   Lab 09/11/20  1032 09/19/20  1736   HGBA1C 5.8* 5.6     Bilirubin:   Recent Labs   Lab 09/11/20  1032 09/19/20  1118 09/20/20  0416   BILITOT 0.4 0.6 0.6     BMP:   Recent Labs   Lab 09/20/20  0416   GLU 99   *   K 4.1      CO2 24   BUN 5*   CREATININE 0.7   CALCIUM 8.9   MG 1.9     CBC:   Recent Labs   Lab 09/19/20  1118 09/20/20  0416   WBC 4.62 4.59   HGB 13.5 12.8   HCT 38.9 37.8    271     CMP:   Recent Labs   Lab 09/19/20  1118 09/20/20  0416   * 134*   K 4.4 4.1    100   CO2 22* 24   GLU 99 99   BUN 6 5*   CREATININE 0.8 0.7   CALCIUM 9.3 8.9   PROT 8.2 7.1   ALBUMIN 3.7 3.4*   BILITOT 0.6 0.6   ALKPHOS 86 79   AST 57* 42*   ALT 50* 45*   ANIONGAP 12 10   EGFRNONAA >60 >60     Magnesium:   Recent Labs   Lab 09/19/20  1736 09/20/20  0416   MG 1.9 1.9       POCT Glucose:   Recent Labs   Lab 09/19/20  2016 09/20/20  0340   POCTGLUCOSE 76 83     Troponin:   Recent  Labs   Lab 09/19/20  1736   TROPONINI 0.013     TSH:   Recent Labs   Lab 09/19/20  1118   TSH 0.870     Urine Studies:   Recent Labs   Lab 09/18/20  1455   COLORU Yellow   APPEARANCEUA Clear   PHUR 7.0   SPECGRAV 1.015   PROTEINUA Negative   GLUCUA Negative   KETONESU Negative   BILIRUBINUA Negative   OCCULTUA Negative   NITRITE Negative   UROBILINOGEN Negative   LEUKOCYTESUR Negative     All pertinent labs within the past 24 hours have been reviewed.    Significant Imaging: I have reviewed and interpreted all pertinent imaging results/findings within the past 24 hours.   Imaging Results          X-Ray Chest AP Portable (Final result)  Result time 09/19/20 15:29:00    Final result by Russ Moon MD (09/19/20 15:29:00)                 Impression:      1. No acute cardiopulmonary process, shallow inspiratory effort.      Electronically signed by: Russ Moon MD  Date:    09/19/2020  Time:    15:29             Narrative:    EXAMINATION:  XR CHEST AP PORTABLE    CLINICAL HISTORY:  Concern for aspiration;    TECHNIQUE:  Single frontal view of the chest was performed.    COMPARISON:  08/07/2020    FINDINGS:  The cardiomediastinal silhouette is not enlarged.  There is no pleural effusion.  The trachea is midline.  The lungs are symmetrically expanded bilaterally with mildly coarse interstitial attenuation, accentuated by shallow inspiratory effort..  No large focal consolidation seen.  There is no pneumothorax.  The osseous structures are remarkable for degenerative changes of the spine..  There is a questionable calcified granuloma or calcified lymph node along the medial aspect of the left upper lobe versus artifact.                               CT Soft Tissue Neck With Contrast (Final result)  Result time 09/19/20 12:43:30    Final result by Sean Love MD (09/19/20 12:43:30)                 Impression:      Possible enlargement and early inflammation of the right tonsillar pillar.    Prominent lymph  nodes seen anterior to the distal trachea in the superior mediastinum.      Electronically signed by: Sean Love  Date:    09/19/2020  Time:    12:43             Narrative:    EXAMINATION:  CT SOFT TISSUE NECK WITH CONTRAST    CLINICAL HISTORY:  Neck mass, solitary, afebrile (Age => 16y);anterior neck pain, dyspnea, dysphagia;    TECHNIQUE:  Low dose axial images, coronal and sagittal reformations were obtained from the skull base to the lung bases following the intravenous administration of 75 mL of Omnipaque 350.    COMPARISON:  None.    FINDINGS:  Orbits, paranasal sinuses, and skull base: Normal.    Nasopharynx: Normal.    Suprahyoid neck: Normal oropharynx, oral cavity, there appears to be a vague area of soft tissue fullness with a central region of hypo attenuation seen to the right of the midline on image number 36 of series 2.  This possibly represents inflammation of the tonsillar pillar however there does not appear to be evidence of enhancement peripherally that is well delineated.  Also as suggested the central aspect is not well delineated to the point that would suggest central necrosis.    More inferiorly in the superior mediastinal region there are lymph nodes anterior to the distal trachea.  And retropharyngeal space.    Infrahyoid neck: Normal larynx, hypopharynx, and supraglottis.    Thyroid: Normal.    Thoracic inlet: Normal lung apices and brachial plexus.    Lymph nodes Normal. No lymphadenopathy.    Vascular structures: Normal.    Lungs: Normal    Mediastinum: Normal    Other findings:

## 2020-09-20 NOTE — SIGNIFICANT EVENT
Responded to MET.     Patient sitting up in bed with NC in place. Patient back at baseline and stating she had an episode where she could not breath. She was attempting to drink water and it came out of her nose and mouth. VSS. 99% on RA(before NC placed). Physical exam unchanged.     Chest xray ordered and reviewed, no significant changes.     Discussed case with senior resident, GI and ENT. ENT to scope patient tonight and EGD in am.    Maurisio Crawford MD  [facility] HO-2

## 2020-09-20 NOTE — PLAN OF CARE
Care plan reviewed with pt. Vital sign monitoring in place. Glucose monitoring in place. Medications administered as ordered. Safety maintained, bed at lowest position, wheels locked, nonski socks on, call light within reach.

## 2020-09-20 NOTE — PROGRESS NOTES
[facility] Hospital Medicine  Progress Note    Patient Name: Elva Caceres  MRN: [id number]  Patient Class: OP- Observation   Admission Date: 9/19/2020  Length of Stay: 0 days  Attending Physician: Pranav Stroud MD  Primary Care Provider: Rob Sorto DO        Subjective:     Principal Problem:Esophageal dysmotility        HPI:  Pt is a a 40 y.o. female with PMHx of tonsillectomy and HTN who presents to the ED with complaint of throat pain and dysphagia for 3 days. Dysphagia is associated with solids not liquids. She reports one episode of emesis after drinking a milkshake yesterday since then onset of wheezing. Symptoms are persistent but not worsening. Pt reports feelings of food getting stuck in her throat. She has had difficulty taking her daily medications. Associated symptoms include shortness of breath and feeling of muffled voice. She reports that this has never happened before and no sick contacts.  No associated rhinorrhea. Pt reported attempted to take her inhaler to alleviate symptoms but no improvement. No drooling and no issues with secretions/liquids.     In ED, CT soft tissue of neck showed possible enlargement and early inflammation of the right tonsillar pillar w/ prominent lymph nodes anterior to the distal trachea in superior mediastinum. concern for aspiration so CXR was performed. No sign of aspiration. Labs showed hyponatremia. COVID neg. ENT and Gi were consulted. ENT reported no further  Evaluation. GI reported plan to scope patient and to place her on a liquid diet with PPI and liquid carafate for now.            Overview/Hospital Course:  No notes on file    Interval History: Patient seen and examined at bedside. No acute events overnight. Patient was calm and responded appropriately. She denied any worsening complaints. Patient denied CP, SOB, Cough, HA, Dizziness, F/C, N/V, Abd pain, numbness/tingling, GI or  changes.    She has been tolerating Clear  Liquid Diet.     Review of Systems   Constitutional: Negative for chills and fever.   HENT: Negative for drooling, postnasal drip and rhinorrhea.    Eyes: Negative for discharge and redness.   Respiratory: Negative for apnea, choking, shortness of breath and wheezing.    Cardiovascular: Negative for chest pain and palpitations.   Gastrointestinal: Negative for abdominal pain, diarrhea, nausea and vomiting.   Genitourinary: Negative for dysuria, urgency and vaginal pain.   Musculoskeletal: Negative for gait problem and myalgias.   Neurological: Negative for dizziness, weakness and light-headedness.   Psychiatric/Behavioral: Negative for confusion. The patient is not nervous/anxious.      Objective:     Vital Signs (Most Recent):  Temp: 97.9 °F (36.6 °C) (09/20/20 0345)  Pulse: 80 (09/20/20 0345)  Resp: 18 (09/20/20 0345)  BP: (!) 144/63 (09/20/20 0345)  SpO2: 99 % (09/20/20 0345) Vital Signs (24h Range):  Temp:  [96.6 °F (35.9 °C)-98.7 °F (37.1 °C)] 97.9 °F (36.6 °C)  Pulse:  [] 80  Resp:  [16-20] 18  SpO2:  [99 %-100 %] 99 %  BP: (140-165)/() 144/63     Weight: 127.6 kg (281 lb 6.4 oz)  Body mass index is 41.56 kg/m².  No intake or output data in the 24 hours ending 09/20/20 0732   Physical Exam  Vitals signs and nursing note reviewed.   Constitutional:       Appearance: Normal appearance. She is obese.   HENT:      Head: Normocephalic and atraumatic.      Mouth/Throat:      Mouth: Mucous membranes are moist.      Comments: Mild erythema and swelling in palatopharyngeal arch  tonsills removed  Eyes:      Conjunctiva/sclera: Conjunctivae normal.      Pupils: Pupils are equal, round, and reactive to light.   Neck:      Musculoskeletal: Normal range of motion.      Comments: Left lateral swelling  And tenderness to palpation   Cardiovascular:      Rate and Rhythm: Normal rate and regular rhythm.      Pulses: Normal pulses.      Heart sounds: Normal heart sounds. No murmur.   Pulmonary:      Effort:  Pulmonary effort is normal. No respiratory distress.      Breath sounds: Normal breath sounds. No wheezing.   Chest:      Chest wall: No tenderness.   Abdominal:      General: Abdomen is flat. There is no distension.      Palpations: Abdomen is soft.      Tenderness: There is no abdominal tenderness.   Skin:     General: Skin is warm.      Capillary Refill: Capillary refill takes less than 2 seconds.      Coloration: Skin is not jaundiced.      Findings: No erythema.   Neurological:      General: No focal deficit present.      Mental Status: She is alert and oriented to person, place, and time.   Psychiatric:         Mood and Affect: Mood normal.         Behavior: Behavior normal.         Significant Labs:   A1C:   Recent Labs   Lab 09/11/20  1032 09/19/20  1736   HGBA1C 5.8* 5.6     Bilirubin:   Recent Labs   Lab 09/11/20  1032 09/19/20  1118 09/20/20  0416   BILITOT 0.4 0.6 0.6     BMP:   Recent Labs   Lab 09/20/20  0416   GLU 99   *   K 4.1      CO2 24   BUN 5*   CREATININE 0.7   CALCIUM 8.9   MG 1.9     CBC:   Recent Labs   Lab 09/19/20  1118 09/20/20  0416   WBC 4.62 4.59   HGB 13.5 12.8   HCT 38.9 37.8    271     CMP:   Recent Labs   Lab 09/19/20  1118 09/20/20  0416   * 134*   K 4.4 4.1    100   CO2 22* 24   GLU 99 99   BUN 6 5*   CREATININE 0.8 0.7   CALCIUM 9.3 8.9   PROT 8.2 7.1   ALBUMIN 3.7 3.4*   BILITOT 0.6 0.6   ALKPHOS 86 79   AST 57* 42*   ALT 50* 45*   ANIONGAP 12 10   EGFRNONAA >60 >60     Magnesium:   Recent Labs   Lab 09/19/20  1736 09/20/20  0416   MG 1.9 1.9       POCT Glucose:   Recent Labs   Lab 09/19/20  2016 09/20/20  0340   POCTGLUCOSE 76 83     Troponin:   Recent Labs   Lab 09/19/20  1736   TROPONINI 0.013     TSH:   Recent Labs   Lab 09/19/20  1118   TSH 0.870     Urine Studies:   Recent Labs   Lab 09/18/20  1455   COLORU Yellow   APPEARANCEUA Clear   PHUR 7.0   SPECGRAV 1.015   PROTEINUA Negative   GLUCUA Negative   KETONESU Negative   BILIRUBINUA  Negative   OCCULTUA Negative   NITRITE Negative   UROBILINOGEN Negative   LEUKOCYTESUR Negative     All pertinent labs within the past 24 hours have been reviewed.    Significant Imaging: I have reviewed and interpreted all pertinent imaging results/findings within the past 24 hours.   Imaging Results          X-Ray Chest AP Portable (Final result)  Result time 09/19/20 15:29:00    Final result by Russ Moon MD (09/19/20 15:29:00)                 Impression:      1. No acute cardiopulmonary process, shallow inspiratory effort.      Electronically signed by: Russ Moon MD  Date:    09/19/2020  Time:    15:29             Narrative:    EXAMINATION:  XR CHEST AP PORTABLE    CLINICAL HISTORY:  Concern for aspiration;    TECHNIQUE:  Single frontal view of the chest was performed.    COMPARISON:  08/07/2020    FINDINGS:  The cardiomediastinal silhouette is not enlarged.  There is no pleural effusion.  The trachea is midline.  The lungs are symmetrically expanded bilaterally with mildly coarse interstitial attenuation, accentuated by shallow inspiratory effort..  No large focal consolidation seen.  There is no pneumothorax.  The osseous structures are remarkable for degenerative changes of the spine..  There is a questionable calcified granuloma or calcified lymph node along the medial aspect of the left upper lobe versus artifact.                               CT Soft Tissue Neck With Contrast (Final result)  Result time 09/19/20 12:43:30    Final result by Sean Love MD (09/19/20 12:43:30)                 Impression:      Possible enlargement and early inflammation of the right tonsillar pillar.    Prominent lymph nodes seen anterior to the distal trachea in the superior mediastinum.      Electronically signed by: Sean Love  Date:    09/19/2020  Time:    12:43             Narrative:    EXAMINATION:  CT SOFT TISSUE NECK WITH CONTRAST    CLINICAL HISTORY:  Neck mass, solitary, afebrile (Age =>  16y);anterior neck pain, dyspnea, dysphagia;    TECHNIQUE:  Low dose axial images, coronal and sagittal reformations were obtained from the skull base to the lung bases following the intravenous administration of 75 mL of Omnipaque 350.    COMPARISON:  None.    FINDINGS:  Orbits, paranasal sinuses, and skull base: Normal.    Nasopharynx: Normal.    Suprahyoid neck: Normal oropharynx, oral cavity, there appears to be a vague area of soft tissue fullness with a central region of hypo attenuation seen to the right of the midline on image number 36 of series 2.  This possibly represents inflammation of the tonsillar pillar however there does not appear to be evidence of enhancement peripherally that is well delineated.  Also as suggested the central aspect is not well delineated to the point that would suggest central necrosis.    More inferiorly in the superior mediastinal region there are lymph nodes anterior to the distal trachea.  And retropharyngeal space.    Infrahyoid neck: Normal larynx, hypopharynx, and supraglottis.    Thyroid: Normal.    Thoracic inlet: Normal lung apices and brachial plexus.    Lymph nodes Normal. No lymphadenopathy.    Vascular structures: Normal.    Lungs: Normal    Mediastinum: Normal    Other findings:                                    Assessment/Plan:      * Esophageal dysmotility  3 days of esophageal dysphagia associated with solids  CT soft tissue of neck showed possible enlargement and early inflammation of the right tonsillar pillar w/ prominent lymph nodes anterior to the distal trachea in superior mediastinum.  CXR no sign of aspiration    Plan:  - GI consult, recs appreciated  - PPI BID  - carafate (liquid) QID  - clear liquid diet  - swallow study pending  - plan for scope per GI on Monday            Gastroesophageal reflux disease with esophagitis          VTE Risk Mitigation (From admission, onward)         Ordered     enoxaparin injection 40 mg  Every 24 hours (non-standard  times)      09/20/20 1001     IP VTE HIGH RISK PATIENT  Once      09/19/20 1658     Place sequential compression device  Until discontinued      09/19/20 1658                Discharge Planning   GINI:      Code Status: Full Code   Is the patient medically ready for discharge?:     Reason for patient still in hospital (select all that apply): Patient trending condition                     Franc Sales DO  Department of Hospital Medicine   [facility]

## 2020-09-20 NOTE — PT/OT/SLP PROGRESS
Speech Language Pathology      Elva CHERELLE Caceres  MRN: [id number]    Patient not seen today secondary to Other (Comment)(Per RN Roopa pt placed on clear liquid diet by GI. NPO after midnight for GI procedure in AM.). Will follow-up 9/21/20.    Cheri Gonzalez, CCC-SLP

## 2020-09-20 NOTE — PLAN OF CARE
Problem: Adult Inpatient Plan of Care  Goal: Plan of Care Review  Outcome: Ongoing, Progressing     VIRTUAL NURSE:  Cued into patient's room.  Permission received per patient to turn camera to view patient.  Introduced as VN for night shift that will be working with floor nurse and nursing assistant.  Educated patient on VN's role in patient care and  VIP model.  Plan of care reviewed with patient.  Education per flowsheet.   Informed patient that staff will round on them every 2 hours but to use call light for any other needs they may have; informed of fall risk and fall precautions.  Patient verbalized understanding.  Call light within reach; bed siderails up x3.  Opportunity given for questions and questions answered.  Patient denies complaints or any needs at this time.  Will cont to monitor and intervene as needed.    Labs, notes, orders, and careplan reviewed.

## 2020-09-20 NOTE — PT/OT/SLP EVAL
"Physical Therapy Evaluation and Discharge Note    Patient Name:  Elva Caceres   MRN:  [id number]    Recommendations:     Discharge Recommendations:  home   Discharge Equipment Recommendations: none   Barriers to discharge: None    Assessment:     Elva Caceres is a 40 y.o. female admitted with a medical diagnosis of Esophageal dysmotility. .  At this time, patient is functioning at their prior level of function and does not require further acute PT services.     Recent Surgery: Procedure(s) (LRB):  EGD (ESOPHAGOGASTRODUODENOSCOPY) (N/A)      Plan:     During this hospitalization, patient does not require further acute PT services.  Please re-consult if situation changes.      Subjective     Chief Complaint: "It hurts to swallow"  Patient/Family Comments/goals: go home  Pain/Comfort:  · Pain Rating 1: 0/10  · Pain Rating Post-Intervention 1: 0/10    Patients cultural, spiritual, Worship conflicts given the current situation: no    Living Environment:  Patient lives with her family in a double wide trailer with no steps to enter.   Prior to admission, patients level of function was independent.  Equipment used at home: none.  DME owned (not currently used): none.  Upon discharge, patient will have assistance from family.    Objective:     Communicated with braulio Blas prior to session.  Patient found HOB elevated with peripheral IV upon PT entry to room.    General Precautions: Standard, fall   Orthopedic Precautions:N/A   Braces: N/A     Exams:  · Cognitive Exam:  Patient is oriented to Person, Place, Time and Situation  · RLE Strength: WFL  · LLE Strength: WFL    Functional Mobility:  · Bed Mobility:     · Rolling Left:  independence  · Rolling Right: independence  · Supine to Sit: independence  · Sit to Supine: independence  · Transfers:     · Sit to Stand:  independence with no AD  · Gait: Patient ambulated 35 ft independently with no AD    AM-PAC 6 CLICK MOBILITY  Total Score:24       Therapeutic Activities " and Exercises:   PT evaluation completed. Patient presents with no deficits and no PT needs at this time.     AM-PAC 6 CLICK MOBILITY  Total Score:24     Patient left HOB elevated with all lines intact and call button in reach.    GOALS:   Multidisciplinary Problems     Physical Therapy Goals     Not on file                History:     Past Medical History:   Diagnosis Date    Asthma     GERD with esophagitis     Hypertension     Left ovarian cyst        Past Surgical History:   Procedure Laterality Date    endometrial polypectomy         Time Tracking:     PT Received On: 09/20/20  PT Start Time: 0826     PT Stop Time: 0832  PT Total Time (min): 6 min     Billable Minutes: Evaluation 6      Teresa Gonzalez, PT  09/20/2020

## 2020-09-20 NOTE — PLAN OF CARE
Pt on RA with documented sats.  The proper method of use, as well as anticipated side effects, of this metered-dose inhaler are discussed and demonstrated to the patient.  Will continue to monitor.

## 2020-09-20 NOTE — NURSING
MET called due to pt shocking on water and having difficulty breathing. STAT CXR ordered. Pt stabalized. Placed on o2@ 2l/min via NC. CXR obtained. Pt sating @ 99% at this time, stabled.

## 2020-09-21 ENCOUNTER — ANESTHESIA EVENT (OUTPATIENT)
Dept: ENDOSCOPY | Facility: HOSPITAL | Age: 40
End: 2020-09-21
Payer: COMMERCIAL

## 2020-09-21 ENCOUNTER — TELEPHONE (OUTPATIENT)
Dept: OTOLARYNGOLOGY | Facility: CLINIC | Age: 40
End: 2020-09-21

## 2020-09-21 ENCOUNTER — ANESTHESIA (OUTPATIENT)
Dept: ENDOSCOPY | Facility: HOSPITAL | Age: 40
End: 2020-09-21
Payer: COMMERCIAL

## 2020-09-21 LAB
ALBUMIN SERPL BCP-MCNC: 3.6 G/DL (ref 3.5–5.2)
ALP SERPL-CCNC: 81 U/L (ref 55–135)
ALT SERPL W/O P-5'-P-CCNC: 49 U/L (ref 10–44)
ANION GAP SERPL CALC-SCNC: 12 MMOL/L (ref 8–16)
AST SERPL-CCNC: 46 U/L (ref 10–40)
BASOPHILS # BLD AUTO: 0.05 K/UL (ref 0–0.2)
BASOPHILS NFR BLD: 1.2 % (ref 0–1.9)
BILIRUB SERPL-MCNC: 0.5 MG/DL (ref 0.1–1)
BUN SERPL-MCNC: 5 MG/DL (ref 6–20)
CALCIUM SERPL-MCNC: 9.2 MG/DL (ref 8.7–10.5)
CHLORIDE SERPL-SCNC: 100 MMOL/L (ref 95–110)
CO2 SERPL-SCNC: 22 MMOL/L (ref 23–29)
CREAT SERPL-MCNC: 0.7 MG/DL (ref 0.5–1.4)
DIFFERENTIAL METHOD: NORMAL
EOSINOPHIL # BLD AUTO: 0.1 K/UL (ref 0–0.5)
EOSINOPHIL NFR BLD: 3.1 % (ref 0–8)
ERYTHROCYTE [DISTWIDTH] IN BLOOD BY AUTOMATED COUNT: 14.2 % (ref 11.5–14.5)
EST. GFR  (AFRICAN AMERICAN): >60 ML/MIN/1.73 M^2
EST. GFR  (NON AFRICAN AMERICAN): >60 ML/MIN/1.73 M^2
GLUCOSE SERPL-MCNC: 81 MG/DL (ref 70–110)
HCT VFR BLD AUTO: 38.6 % (ref 37–48.5)
HGB BLD-MCNC: 13.3 G/DL (ref 12–16)
IMM GRANULOCYTES # BLD AUTO: 0.01 K/UL (ref 0–0.04)
IMM GRANULOCYTES NFR BLD AUTO: 0.2 % (ref 0–0.5)
LYMPHOCYTES # BLD AUTO: 1 K/UL (ref 1–4.8)
LYMPHOCYTES NFR BLD: 23.3 % (ref 18–48)
MAGNESIUM SERPL-MCNC: 1.8 MG/DL (ref 1.6–2.6)
MCH RBC QN AUTO: 28.7 PG (ref 27–31)
MCHC RBC AUTO-ENTMCNC: 34.5 G/DL (ref 32–36)
MCV RBC AUTO: 83 FL (ref 82–98)
MONOCYTES # BLD AUTO: 0.5 K/UL (ref 0.3–1)
MONOCYTES NFR BLD: 12.8 % (ref 4–15)
NEUTROPHILS # BLD AUTO: 2.5 K/UL (ref 1.8–7.7)
NEUTROPHILS NFR BLD: 59.4 % (ref 38–73)
NRBC BLD-RTO: 0 /100 WBC
PHOSPHATE SERPL-MCNC: 3.4 MG/DL (ref 2.7–4.5)
PLATELET # BLD AUTO: 278 K/UL (ref 150–350)
PMV BLD AUTO: 10.9 FL (ref 9.2–12.9)
POCT GLUCOSE: 74 MG/DL (ref 70–110)
POCT GLUCOSE: 74 MG/DL (ref 70–110)
POCT GLUCOSE: 84 MG/DL (ref 70–110)
POTASSIUM SERPL-SCNC: 3.9 MMOL/L (ref 3.5–5.1)
PROT SERPL-MCNC: 7.5 G/DL (ref 6–8.4)
RBC # BLD AUTO: 4.64 M/UL (ref 4–5.4)
SODIUM SERPL-SCNC: 134 MMOL/L (ref 136–145)
WBC # BLD AUTO: 4.21 K/UL (ref 3.9–12.7)

## 2020-09-21 PROCEDURE — 88342 IMHCHEM/IMCYTCHM 1ST ANTB: CPT | Performed by: PATHOLOGY

## 2020-09-21 PROCEDURE — 27201012 HC FORCEPS, HOT/COLD, DISP: Performed by: INTERNAL MEDICINE

## 2020-09-21 PROCEDURE — 36415 COLL VENOUS BLD VENIPUNCTURE: CPT

## 2020-09-21 PROCEDURE — 11000001 HC ACUTE MED/SURG PRIVATE ROOM

## 2020-09-21 PROCEDURE — 94640 AIRWAY INHALATION TREATMENT: CPT

## 2020-09-21 PROCEDURE — 25000003 PHARM REV CODE 250: Performed by: STUDENT IN AN ORGANIZED HEALTH CARE EDUCATION/TRAINING PROGRAM

## 2020-09-21 PROCEDURE — 83735 ASSAY OF MAGNESIUM: CPT

## 2020-09-21 PROCEDURE — 63600175 PHARM REV CODE 636 W HCPCS: Performed by: NURSE ANESTHETIST, CERTIFIED REGISTERED

## 2020-09-21 PROCEDURE — G0378 HOSPITAL OBSERVATION PER HR: HCPCS

## 2020-09-21 PROCEDURE — 25000003 PHARM REV CODE 250: Performed by: NURSE ANESTHETIST, CERTIFIED REGISTERED

## 2020-09-21 PROCEDURE — 88305 TISSUE EXAM BY PATHOLOGIST: CPT | Mod: 59 | Performed by: PATHOLOGY

## 2020-09-21 PROCEDURE — 84100 ASSAY OF PHOSPHORUS: CPT

## 2020-09-21 PROCEDURE — 88305 TISSUE EXAM BY PATHOLOGIST: CPT | Mod: 26,,, | Performed by: PATHOLOGY

## 2020-09-21 PROCEDURE — 88342 IMHCHEM/IMCYTCHM 1ST ANTB: CPT | Mod: 26,,, | Performed by: PATHOLOGY

## 2020-09-21 PROCEDURE — 88305 TISSUE EXAM BY PATHOLOGIST: ICD-10-PCS | Mod: 26,,, | Performed by: PATHOLOGY

## 2020-09-21 PROCEDURE — 43239 EGD BIOPSY SINGLE/MULTIPLE: CPT | Mod: ,,, | Performed by: INTERNAL MEDICINE

## 2020-09-21 PROCEDURE — 88342 CHG IMMUNOCYTOCHEMISTRY: ICD-10-PCS | Mod: 26,,, | Performed by: PATHOLOGY

## 2020-09-21 PROCEDURE — 37000009 HC ANESTHESIA EA ADD 15 MINS: Performed by: INTERNAL MEDICINE

## 2020-09-21 PROCEDURE — 37000008 HC ANESTHESIA 1ST 15 MINUTES: Performed by: INTERNAL MEDICINE

## 2020-09-21 PROCEDURE — 85025 COMPLETE CBC W/AUTO DIFF WBC: CPT

## 2020-09-21 PROCEDURE — 43239 EGD BIOPSY SINGLE/MULTIPLE: CPT | Performed by: INTERNAL MEDICINE

## 2020-09-21 PROCEDURE — 80053 COMPREHEN METABOLIC PANEL: CPT

## 2020-09-21 PROCEDURE — 43239 PR EGD, FLEX, W/BIOPSY, SGL/MULTI: ICD-10-PCS | Mod: ,,, | Performed by: INTERNAL MEDICINE

## 2020-09-21 PROCEDURE — 97165 OT EVAL LOW COMPLEX 30 MIN: CPT

## 2020-09-21 RX ORDER — SODIUM CHLORIDE 9 MG/ML
INJECTION, SOLUTION INTRAVENOUS CONTINUOUS PRN
Status: DISCONTINUED | OUTPATIENT
Start: 2020-09-21 | End: 2020-09-21

## 2020-09-21 RX ORDER — TRIAMTERENE AND HYDROCHLOROTHIAZIDE 37.5; 25 MG/1; MG/1
1 CAPSULE ORAL ONCE
Status: COMPLETED | OUTPATIENT
Start: 2020-09-21 | End: 2020-09-21

## 2020-09-21 RX ORDER — PROPOFOL 10 MG/ML
INJECTION, EMULSION INTRAVENOUS
Status: DISCONTINUED | OUTPATIENT
Start: 2020-09-21 | End: 2020-09-21

## 2020-09-21 RX ORDER — LIDOCAINE HCL/PF 100 MG/5ML
SYRINGE (ML) INTRAVENOUS
Status: DISCONTINUED | OUTPATIENT
Start: 2020-09-21 | End: 2020-09-21

## 2020-09-21 RX ORDER — PROPOFOL 10 MG/ML
INJECTION, EMULSION INTRAVENOUS CONTINUOUS PRN
Status: DISCONTINUED | OUTPATIENT
Start: 2020-09-21 | End: 2020-09-21

## 2020-09-21 RX ORDER — TRIAMTERENE AND HYDROCHLOROTHIAZIDE 37.5; 25 MG/1; MG/1
2 CAPSULE ORAL EVERY MORNING
Status: DISCONTINUED | OUTPATIENT
Start: 2020-09-22 | End: 2020-09-23 | Stop reason: HOSPADM

## 2020-09-21 RX ORDER — FLUTICASONE PROPIONATE 50 MCG
2 SPRAY, SUSPENSION (ML) NASAL DAILY
Status: DISCONTINUED | OUTPATIENT
Start: 2020-09-21 | End: 2020-09-23 | Stop reason: HOSPADM

## 2020-09-21 RX ADMIN — PROPOFOL 50 MG: 10 INJECTION, EMULSION INTRAVENOUS at 03:09

## 2020-09-21 RX ADMIN — CARBAMAZEPINE 100 MG: 100 CAPSULE, EXTENDED RELEASE ORAL at 09:09

## 2020-09-21 RX ADMIN — HYDROCODONE BITARTRATE AND ACETAMINOPHEN 1 TABLET: 5; 325 TABLET ORAL at 12:09

## 2020-09-21 RX ADMIN — TRIAMTERENE AND HYDROCHLOROTHIAZIDE 1 CAPSULE: 25; 37.5 CAPSULE ORAL at 10:09

## 2020-09-21 RX ADMIN — PROPOFOL 80 MG: 10 INJECTION, EMULSION INTRAVENOUS at 03:09

## 2020-09-21 RX ADMIN — FLUTICASONE FUROATE AND VILANTEROL TRIFENATATE 1 PUFF: 100; 25 POWDER RESPIRATORY (INHALATION) at 08:09

## 2020-09-21 RX ADMIN — LIDOCAINE HYDROCHLORIDE 50 MG: 20 INJECTION, SOLUTION INTRAVENOUS at 03:09

## 2020-09-21 RX ADMIN — HYDROCODONE BITARTRATE AND ACETAMINOPHEN 1 TABLET: 5; 325 TABLET ORAL at 09:09

## 2020-09-21 RX ADMIN — PANTOPRAZOLE SODIUM 40 MG: 40 TABLET, DELAYED RELEASE ORAL at 09:09

## 2020-09-21 RX ADMIN — SUCRALFATE 1 G: 1 SUSPENSION ORAL at 11:09

## 2020-09-21 RX ADMIN — PROPOFOL 150 MCG/KG/MIN: 10 INJECTION, EMULSION INTRAVENOUS at 03:09

## 2020-09-21 RX ADMIN — DOCUSATE SODIUM - SENNOSIDES 1 TABLET: 50; 8.6 TABLET, FILM COATED ORAL at 09:09

## 2020-09-21 RX ADMIN — SODIUM CHLORIDE: 0.9 INJECTION, SOLUTION INTRAVENOUS at 03:09

## 2020-09-21 NOTE — PLAN OF CARE
Pt on RA with documented sats. 100. The proper method of use, as well as anticipated side effects, of this metered-dose inhaler are discussed and demonstrated to the patient. Will continue to monitor.

## 2020-09-21 NOTE — PLAN OF CARE
Problem: Occupational Therapy Goal  Goal: Occupational Therapy Goal  Outcome: Met   Pt found [facility] & agreeable to OT eval this AM.  Pt c/o unrated pain at throat & perf all eval tasks (I)ly.  Edu/tx re: general safety techs & HEP.  Pt/mother verbalized understanding.  Pt left sitting EOB w/ nsg notified.    Per OT eval, pt  w/o change in fxnl status from baseline (I) & no further OT svcs indicated at this time.  DC acute OT this date. Pt appropriate for d/c home w/ no DME needs.

## 2020-09-21 NOTE — SUBJECTIVE & OBJECTIVE
Medications:  Continuous Infusions:  Scheduled Meds:   carBAMazepine  100 mg Oral BID    [START ON 9/21/2020] enoxparin  40 mg Subcutaneous Q24H    fluticasone furoate-vilanteroL  1 puff Inhalation Daily    pantoprazole  40 mg Oral BID    senna-docusate 8.6-50 mg  1 tablet Oral BID    sucralfate  1 g Oral Q6H    triamterene-hydrochlorothiazide 37.5-25 mg  1 capsule Oral QAM     PRN Meds:acetaminophen, acetaminophen, dextrose 50%, dextrose 50%, glucagon (human recombinant), glucose, glucose, HYDROcodone-acetaminophen, insulin aspart U-100, ondansetron, sodium chloride 0.9%     No current facility-administered medications on file prior to encounter.      Current Outpatient Medications on File Prior to Encounter   Medication Sig    BREO ELLIPTA 100-25 mcg/dose diskus inhaler TAKE 1 PUFF BY MOUTH EVERY DAY    carBAMazepine (TEGRETOL XR) 100 MG 12 hr tablet Take 1 tablet (100 mg total) by mouth 2 (two) times daily.    omeprazole (PRILOSEC) 20 MG capsule Take 1 capsule (20 mg total) by mouth 2 (two) times a day.    triamterene-hydrochlorothiazide 37.5-25 mg (DYAZIDE) 37.5-25 mg per capsule TAKE ONE CAPSULE BY MOUTH IN THE MORNING    acetaminophen (TYLENOL) 325 MG tablet Take 1 tablet (325 mg total) by mouth every 6 (six) hours as needed for Pain.    diclofenac sodium (VOLTAREN) 1 % Gel Apply 2 g topically 4 (four) times daily. (Patient not taking: Reported on 9/11/2020)    meloxicam (MOBIC) 7.5 MG tablet Take 1 tablet (7.5 mg total) by mouth daily as needed for Pain. Can take 1-2 tabs per day as needed for pain. Do not take more than 2 tabs per day.    ondansetron (ZOFRAN) 8 MG tablet Take 1 tablet (8 mg total) by mouth every 8 (eight) hours as needed for Nausea.    propylene glycol (SYSTANE BALANCE) 0.6 % Drop Apply 1 drop to eye 4 (four) times daily as needed. (Patient not taking: Reported on 9/11/2020)    valACYclovir (VALTREX) 1000 MG tablet Take 1 tablet (1,000 mg total) by mouth 3 (three) times  daily. for 7 days (Patient not taking: Reported on 2020)       Review of patient's allergies indicates:  No Known Allergies    Past Medical History:   Diagnosis Date    Asthma     GERD with esophagitis     Hypertension     Left ovarian cyst      Past Surgical History:   Procedure Laterality Date    endometrial polypectomy       Family History     None        Tobacco Use    Smoking status: Former Smoker     Quit date: 2020     Years since quittin.1   Substance and Sexual Activity    Alcohol use: Yes     Alcohol/week: 0.0 standard drinks    Drug use: No    Sexual activity: Not on file     Review of Systems   Constitutional: Negative for chills and fever.   HENT: Positive for sore throat (throat pain). Negative for congestion, postnasal drip and rhinorrhea.    Eyes: Negative for pain and redness.   Respiratory: Positive for shortness of breath. Negative for cough and stridor.    Gastrointestinal: Negative for abdominal pain and nausea.   Musculoskeletal: Positive for neck pain (left). Negative for neck stiffness.   Skin: Negative for color change and rash.   Neurological: Negative for speech difficulty and weakness.   Psychiatric/Behavioral: Negative for confusion. The patient is not nervous/anxious.      Objective:     Vital Signs (Most Recent):  Temp: 99 °F (37.2 °C) (20)  Pulse: 88 (20)  Resp: 18 (20)  BP: (!) 164/96 (20)  SpO2: 100 % (20) Vital Signs (24h Range):  Temp:  [97.9 °F (36.6 °C)-99 °F (37.2 °C)] 99 °F (37.2 °C)  Pulse:  [69-90] 88  Resp:  [16-18] 18  SpO2:  [98 %-100 %] 100 %  BP: (144-164)/(63-99) 164/96     Weight: 127.6 kg (281 lb 6.4 oz)  Body mass index is 41.56 kg/m².        Physical Exam  Constitutional:       General: She is not in acute distress.     Appearance: She is obese. She is not ill-appearing or diaphoretic.      Comments: Comfortably resting in bed. Voice with mild dysphonia - slightly rough, no breathiness,  not muffled   HENT:      Head: Normocephalic and atraumatic.      Right Ear: External ear normal.      Left Ear: External ear normal.      Nose: Nose normal.      Mouth/Throat:      Mouth: Mucous membranes are moist.      Pharynx: Oropharynx is clear. No oropharyngeal exudate or posterior oropharyngeal erythema.   Eyes:      Extraocular Movements: Extraocular movements intact.      Pupils: Pupils are equal, round, and reactive to light.      Comments: No signs of parapharyngeal edema or significant erythema   Cardiovascular:      Rate and Rhythm: Normal rate and regular rhythm.   Pulmonary:      Effort: Pulmonary effort is normal. No respiratory distress.      Breath sounds: No stridor.   Musculoskeletal:         General: No deformity.   Skin:     General: Skin is warm and dry.   Neurological:      General: No focal deficit present.      Mental Status: She is alert and oriented to person, place, and time.      Cranial Nerves: No cranial nerve deficit.   Psychiatric:         Mood and Affect: Mood normal.         Behavior: Behavior normal.         Judgment: Judgment normal.         Significant Labs:  CBC:   Recent Labs   Lab 09/20/20  0416   WBC 4.59   RBC 4.51   HGB 12.8   HCT 37.8      MCV 84   MCH 28.4   MCHC 33.9     CMP:   Recent Labs   Lab 09/20/20  0416   GLU 99   CALCIUM 8.9   ALBUMIN 3.4*   PROT 7.1   *   K 4.1   CO2 24      BUN 5*   CREATININE 0.7   ALKPHOS 79   ALT 45*   AST 42*   BILITOT 0.6       Significant Diagnostics:  CT Neck  - no abscess or significant edema of parapharyngeal/peritonsillar space. No supgraglottic edema. No mass.

## 2020-09-21 NOTE — TRANSFER OF CARE
"Anesthesia Transfer of Care Note    Patient: Elva Caceres    Procedure(s) Performed: Procedure(s) (LRB):  EGD (ESOPHAGOGASTRODUODENOSCOPY) (N/A)    Patient location: GI    Anesthesia Type: MAC    Transport from OR: Transported from OR on room air with adequate spontaneous ventilation    Post pain: adequate analgesia    Post assessment: no apparent anesthetic complications and tolerated procedure well    Post vital signs: stable    Level of consciousness: awake, alert and oriented    Nausea/Vomiting: no nausea/vomiting    Complications: none    Transfer of care protocol was followed      Last vitals:   Visit Vitals  BP (!) 150/89   Pulse 95   Temp 36.7 °C (98.1 °F)   Resp 18   Ht 5' 9" (1.753 m)   Wt 127.6 kg (281 lb 6.4 oz)   SpO2 98%   Breastfeeding No   BMI 41.56 kg/m²     "

## 2020-09-21 NOTE — TELEPHONE ENCOUNTER
----- Message from Kristy Sorto sent at 2020  8:47 AM CDT -----  Regarding: Consult  Contact: Loree/ [phone]  CONSULTS:     Patient: Elva Caceres    : 1980    Clinic#: [id number]    Room number: 421    Referring MD: Dr. Crawford    Diagnosis: Dysphonia    Person calling: Loree/ [phone]

## 2020-09-21 NOTE — PROCEDURES
Flexible Fiberoptic Laryngoscopy    Indications: Dysphagia, hoarseness    Anesthesia: Topical xylocaine with octavio-synephrine    Complications: None    Findings: right vocal cord paresis, no mass or lesion, pooling of secretions in piriforms and post cricoid region    Procedure in Detail: After verbal consent obtained and risks, benefits and alternatives were discussed with the patient, nares were decongested and anesthetized, and flexible fiberoptic laryngoscope passed via right nare with following results:  - Nasal cavity: no mass or lesion  - Nasopharynx: no adenoid hypertrophy, no mass or lesion  - Oropharynx: no lingual tonsil asymmetry, no mass or lesion  - Hypopharynx: pooling of secretions limits exam but no obvious mass or lesion  - Supraglottis: no mass or lesion, no significant interarytenoid edema or erythema  - Glottis: left true vocal cord with normal mobility, right true cord hypomobile, good glottic closure, no edema, no mass or lesion    Patient tolerated the procedure well

## 2020-09-21 NOTE — ASSESSMENT & PLAN NOTE
Persistent regurgitation of liquids  Mild dysphonia  ENT consulted, recs appreciated    Plan:  Laryngoscope yesterday  Right vocal cord impairment not consistent with solids getting stuck  Speech evaluation ordered  ENT suggested EGD, will f/u after EGD and speech evaluation

## 2020-09-21 NOTE — INTERVAL H&P NOTE
The patient has been examined and the H&P has been reviewed:    I concur with the findings and no changes have occurred since H&P was written.    Surgery risks, benefits and alternative options discussed and understood by patient/family.          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

## 2020-09-21 NOTE — PT/OT/SLP PROGRESS
Speech Language Pathology      Elva Caceres  MRN: [id number]      1348: Pt remains NPO for now, pt awaiting GI procedure this afternoon.        ADRIANA Pires, CCC-SLP  9/21/2020

## 2020-09-21 NOTE — NURSING
Pt awake, alert, and oriented. Complains of lower back pain. PRN pain med given as ordered. Will cont to monitor for effectiveness.

## 2020-09-21 NOTE — HOSPITAL COURSE
Pt presented with dysphagia with solids. ENT and GI were consulted. Pt was diagnosed with right vocal cord paralysis after undergoing laryngoscope. EGD was performed with gastric mucosal biopsy taken for H pylori and EoE. Speech therapy counseled patient on adjusting her po intake with smaller bites with  Sips of liquid in between. Neurology was consulted for possible dysphagia + possible CN palsy, considering neurosarcoidosis/sarcoidosis in this patient . MRI of the neck was ordered but patient refused. Pt scheduled with outpatient workup with neurology and imaging with follow up with ENT and speech therapy.

## 2020-09-21 NOTE — ASSESSMENT & PLAN NOTE
Patient found to have impairment of right vocal cord on flexible laryngoscopy - see procedure note. Her airway is patent. Normal mobility of left cord. She gets good glottic closure. She does have some pooling of secretions in the post cricoid region and piriforms. While vocal cord motion impairment can impact swallowing it usually does not present with the sensation of food getting stuck as patient reporting. More commonly presents with aspiration issues. Would recommend EGD to further evaluate esophagus. Speech evaluation of swallowing to ensure no aspiration. CT Neck with no obvious etiology of cord paralysis. Will follow up after EGD and speech evaluation. Recommend patient just sat NPO tonight due to event earlier.

## 2020-09-21 NOTE — PROGRESS NOTES
Stopped by to visit with patient. Reviewed chart and discussed plan of care with inpatient primary team. Will plan to see patient in clinic following discharge.       Rob Sorto DO  [facility], PGY-1

## 2020-09-21 NOTE — OR NURSING
Patient on schedule for EGD. Chart reveiwed and report taken from nurse Watson. NPO status appropriate for exam, IV access available.

## 2020-09-21 NOTE — PLAN OF CARE
[facility]:    40 year old woman with dysphagia, dysphonia, and CT neck concerning for tonsillar enlargement, vocal cord paralysis.    EGD done today that showed:  - Erythematous mucosa in gastric body, biopsied and normal esophagus that was biopsied to rule out EoE.    Plan:  - Resume previous diet.  - Follow up EoE and H pylori biopsies.  - Recommend further ENT evaluation as obvious findings to explain dysphagia on EGD and normal esophagus.  - Please call GI with any additional questions or concerns.       Carolin Suazo MD PGY IV  [facility]  Pager: [phone]

## 2020-09-21 NOTE — ANESTHESIA POSTPROCEDURE EVALUATION
Anesthesia Post Evaluation    Patient: Elva Caceres    Procedure(s) Performed: Procedure(s) (LRB):  EGD (ESOPHAGOGASTRODUODENOSCOPY) (N/A)    Final Anesthesia Type: MAC    Patient location during evaluation: GI PACU  Patient participation: Yes- Able to Participate  Level of consciousness: awake and alert  Post-procedure vital signs: reviewed and stable  Pain management: adequate  Airway patency: patent    PONV status at discharge: No PONV  Anesthetic complications: no      Cardiovascular status: blood pressure returned to baseline  Respiratory status: unassisted  Hydration status: euvolemic            Vitals Value Taken Time   /98 09/21/20 1610   Temp 36.8 °C (98.2 °F) 09/21/20 1555   Pulse 92 09/21/20 1610   Resp 16 09/21/20 1610   SpO2 100 % 09/21/20 1610         No case tracking events are documented in the log.      Pain/Rupert Score: Pain Rating Prior to Med Admin: 5 (9/21/2020  1:10 PM)  Pain Rating Post Med Admin: 2 (9/21/2020  1:10 PM)  Rupert Score: 10 (9/21/2020  4:10 PM)

## 2020-09-21 NOTE — ASSESSMENT & PLAN NOTE
Persistent regurgitation of liquids  Mild dysphonia  ENT consulted, recs appreciated    Plan:  Laryngoscope 9/20  Right vocal cord impairment not consistent with solids getting stuck  Speech evaluation ordered  ENT suggested EGD, will f/u after EGD and speech evaluation 9/21  EGD showed no cause of dysphagia, will f.u with ENT for further recs

## 2020-09-21 NOTE — SUBJECTIVE & OBJECTIVE
Interval History: persistent secretion production. On RA comfortable    Review of Systems   Constitutional: Negative for chills and fever.   HENT: Positive for sore throat (throat pain). Negative for congestion, postnasal drip and rhinorrhea.    Eyes: Negative for pain and redness.   Respiratory: Negative for cough, shortness of breath and stridor.    Gastrointestinal: Negative for abdominal pain and nausea.   Genitourinary: Negative for dysuria and urgency.   Musculoskeletal: Positive for neck pain (left). Negative for neck stiffness.   Skin: Negative for color change and rash.   Neurological: Negative for speech difficulty and weakness.   Psychiatric/Behavioral: Negative for confusion. The patient is not nervous/anxious.      Objective:     Vital Signs (Most Recent):  Temp: 98.4 °F (36.9 °C) (09/21/20 0836)  Pulse: 98 (09/21/20 0836)  Resp: 16 (09/21/20 0836)  BP: (!) 163/107 (09/21/20 0836)  SpO2: 100 % (09/21/20 0836) Vital Signs (24h Range):  Temp:  [97.9 °F (36.6 °C)-99 °F (37.2 °C)] 98.4 °F (36.9 °C)  Pulse:  [70-98] 98  Resp:  [14-18] 16  SpO2:  [98 %-100 %] 100 %  BP: (141-164)/() 163/107     Weight: 127.6 kg (281 lb 6.4 oz)  Body mass index is 41.56 kg/m².  No intake or output data in the 24 hours ending 09/21/20 0911   Physical Exam  Vitals signs and nursing note reviewed.   Constitutional:       General: She is not in acute distress.     Appearance: She is obese. She is not ill-appearing or diaphoretic.      Comments: Comfortably resting in bed. Voice with mild dysphonia - on RA   HENT:      Head: Normocephalic and atraumatic.      Right Ear: External ear normal.      Left Ear: External ear normal.      Nose: Nose normal.      Mouth/Throat:      Mouth: Mucous membranes are moist.      Pharynx: Oropharynx is clear. No oropharyngeal exudate or posterior oropharyngeal erythema.   Eyes:      Extraocular Movements: Extraocular movements intact.      Pupils: Pupils are equal, round, and reactive to  light.   Cardiovascular:      Rate and Rhythm: Normal rate and regular rhythm.      Pulses: Normal pulses.      Heart sounds: Normal heart sounds. No murmur.   Pulmonary:      Effort: Pulmonary effort is normal. No respiratory distress.      Breath sounds: No stridor.   Abdominal:      General: Abdomen is flat.      Palpations: Abdomen is soft.   Musculoskeletal:         General: No deformity.   Skin:     General: Skin is warm and dry.      Capillary Refill: Capillary refill takes less than 2 seconds.   Neurological:      General: No focal deficit present.      Mental Status: She is alert and oriented to person, place, and time.      Cranial Nerves: No cranial nerve deficit.   Psychiatric:         Mood and Affect: Mood normal.         Behavior: Behavior normal.         Judgment: Judgment normal.         Significant Labs:   A1C:   Recent Labs   Lab 09/11/20  1032 09/19/20  1736   HGBA1C 5.8* 5.6     CBC:   Recent Labs   Lab 09/19/20  1118 09/20/20  0416 09/21/20  0329   WBC 4.62 4.59 4.21   HGB 13.5 12.8 13.3   HCT 38.9 37.8 38.6    271 278     CMP:   Recent Labs   Lab 09/19/20  1118 09/20/20  0416 09/21/20  0329   * 134* 134*   K 4.4 4.1 3.9    100 100   CO2 22* 24 22*   GLU 99 99 81   BUN 6 5* 5*   CREATININE 0.8 0.7 0.7   CALCIUM 9.3 8.9 9.2   PROT 8.2 7.1 7.5   ALBUMIN 3.7 3.4* 3.6   BILITOT 0.6 0.6 0.5   ALKPHOS 86 79 81   AST 57* 42* 46*   ALT 50* 45* 49*   ANIONGAP 12 10 12   EGFRNONAA >60 >60 >60     Magnesium:   Recent Labs   Lab 09/19/20  1736 09/20/20  0416 09/21/20  0329   MG 1.9 1.9 1.8     TSH:   Recent Labs   Lab 09/19/20  1118   TSH 0.870       Significant Imaging: I have reviewed all pertinent imaging results/findings within the past 24 hours.

## 2020-09-21 NOTE — NURSING
Pt returned to unit after procedure. VSS. 100% on room air. Awake, alert, and oriented. Contacted MD to inform of return and ask about placing pt back on a clear diet. Team will go in and assess. No complaints of pain. Able to make needs known to staff. Will cont to monitor.

## 2020-09-21 NOTE — PROGRESS NOTES
[facility] Hospital Medicine  Progress Note    Patient Name: Elva Caceres  MRN: [id number]  Patient Class: OP- Observation   Admission Date: 9/19/2020  Length of Stay: 0 days  Attending Physician: Pranav Stroud MD  Primary Care Provider: Rob Sorto DO        Subjective:     Principal Problem:Esophageal dysmotility        HPI:  Pt is a a 40 y.o. female with PMHx of tonsillectomy and HTN who presents to the ED with complaint of throat pain and dysphagia for 3 days. Dysphagia is associated with solids not liquids. She reports one episode of emesis after drinking a milkshake yesterday since then onset of wheezing. Symptoms are persistent but not worsening. Pt reports feelings of food getting stuck in her throat. She has had difficulty taking her daily medications. Associated symptoms include shortness of breath and feeling of muffled voice. She reports that this has never happened before and no sick contacts.  No associated rhinorrhea. Pt reported attempted to take her inhaler to alleviate symptoms but no improvement. No drooling and no issues with secretions/liquids.     In ED, CT soft tissue of neck showed possible enlargement and early inflammation of the right tonsillar pillar w/ prominent lymph nodes anterior to the distal trachea in superior mediastinum. concern for aspiration so CXR was performed. No sign of aspiration. Labs showed hyponatremia. COVID neg. ENT and Gi were consulted. ENT reported no further  Evaluation. GI reported plan to scope patient and to place her on a liquid diet with PPI and liquid carafate for now.            Overview/Hospital Course:  9/20 ENT laryngoscope showed right vocal cord impaired  9/21 scheduled for EGD today    Interval History: persistent secretion production. On RA comfortable    Review of Systems   Constitutional: Negative for chills and fever.   HENT: Positive for sore throat (throat pain). Negative for congestion, postnasal drip and  rhinorrhea.    Eyes: Negative for pain and redness.   Respiratory: Negative for cough, shortness of breath and stridor.    Gastrointestinal: Negative for abdominal pain and nausea.   Genitourinary: Negative for dysuria and urgency.   Musculoskeletal: Positive for neck pain (left). Negative for neck stiffness.   Skin: Negative for color change and rash.   Neurological: Negative for speech difficulty and weakness.   Psychiatric/Behavioral: Negative for confusion. The patient is not nervous/anxious.      Objective:     Vital Signs (Most Recent):  Temp: 98.4 °F (36.9 °C) (09/21/20 0836)  Pulse: 98 (09/21/20 0836)  Resp: 16 (09/21/20 0836)  BP: (!) 163/107 (09/21/20 0836)  SpO2: 100 % (09/21/20 0836) Vital Signs (24h Range):  Temp:  [97.9 °F (36.6 °C)-99 °F (37.2 °C)] 98.4 °F (36.9 °C)  Pulse:  [70-98] 98  Resp:  [14-18] 16  SpO2:  [98 %-100 %] 100 %  BP: (141-164)/() 163/107     Weight: 127.6 kg (281 lb 6.4 oz)  Body mass index is 41.56 kg/m².  No intake or output data in the 24 hours ending 09/21/20 0911   Physical Exam  Vitals signs and nursing note reviewed.   Constitutional:       General: She is not in acute distress.     Appearance: She is obese. She is not ill-appearing or diaphoretic.      Comments: Comfortably resting in bed. Voice with mild dysphonia - on RA   HENT:      Head: Normocephalic and atraumatic.      Right Ear: External ear normal.      Left Ear: External ear normal.      Nose: Nose normal.      Mouth/Throat:      Mouth: Mucous membranes are moist.      Pharynx: Oropharynx is clear. No oropharyngeal exudate or posterior oropharyngeal erythema.   Eyes:      Extraocular Movements: Extraocular movements intact.      Pupils: Pupils are equal, round, and reactive to light.   Cardiovascular:      Rate and Rhythm: Normal rate and regular rhythm.      Pulses: Normal pulses.      Heart sounds: Normal heart sounds. No murmur.   Pulmonary:      Effort: Pulmonary effort is normal. No respiratory distress.       Breath sounds: No stridor.   Abdominal:      General: Abdomen is flat.      Palpations: Abdomen is soft.   Musculoskeletal:         General: No deformity.   Skin:     General: Skin is warm and dry.      Capillary Refill: Capillary refill takes less than 2 seconds.   Neurological:      General: No focal deficit present.      Mental Status: She is alert and oriented to person, place, and time.      Cranial Nerves: No cranial nerve deficit.   Psychiatric:         Mood and Affect: Mood normal.         Behavior: Behavior normal.         Judgment: Judgment normal.         Significant Labs:   A1C:   Recent Labs   Lab 09/11/20  1032 09/19/20  1736   HGBA1C 5.8* 5.6     CBC:   Recent Labs   Lab 09/19/20  1118 09/20/20  0416 09/21/20  0329   WBC 4.62 4.59 4.21   HGB 13.5 12.8 13.3   HCT 38.9 37.8 38.6    271 278     CMP:   Recent Labs   Lab 09/19/20  1118 09/20/20  0416 09/21/20  0329   * 134* 134*   K 4.4 4.1 3.9    100 100   CO2 22* 24 22*   GLU 99 99 81   BUN 6 5* 5*   CREATININE 0.8 0.7 0.7   CALCIUM 9.3 8.9 9.2   PROT 8.2 7.1 7.5   ALBUMIN 3.7 3.4* 3.6   BILITOT 0.6 0.6 0.5   ALKPHOS 86 79 81   AST 57* 42* 46*   ALT 50* 45* 49*   ANIONGAP 12 10 12   EGFRNONAA >60 >60 >60     Magnesium:   Recent Labs   Lab 09/19/20  1736 09/20/20  0416 09/21/20  0329   MG 1.9 1.9 1.8     TSH:   Recent Labs   Lab 09/19/20  1118   TSH 0.870       Significant Imaging: I have reviewed all pertinent imaging results/findings within the past 24 hours.      Assessment/Plan:      * Esophageal dysmotility  3 days of esophageal dysphagia associated with solids  CT soft tissue of neck showed possible enlargement and early inflammation of the right tonsillar pillar w/ prominent lymph nodes anterior to the distal trachea in superior mediastinum.  CXR no sign of aspiration    Plan:  - GI consult, recs appreciated  - PPI BID  - carafate (liquid) QID  - clear liquid diet  - swallow study pending  - EGD this AM            Vocal cord  paresis  Persistent regurgitation of liquids  Mild dysphonia  ENT consulted, recs appreciated    Plan:  Laryngoscope yesterday  Right vocal cord impairment not consistent with solids getting stuck  Speech evaluation ordered  ENT suggested EGD, will f/u after EGD and speech evaluation      Gastroesophageal reflux disease with esophagitis        VTE Risk Mitigation (From admission, onward)         Ordered     enoxaparin injection 40 mg  Every 24 hours (non-standard times)      09/20/20 1001     IP VTE HIGH RISK PATIENT  Once      09/19/20 1658     Place sequential compression device  Until discontinued      09/19/20 1658                Discharge Planning   GINI:      Code Status: Full Code   Is the patient medically ready for discharge?:     Reason for patient still in hospital (select all that apply): Patient trending condition, Treatment and Consult recommendations                     Bernabe Harrison MD  Department of Hospital Medicine   [facility]

## 2020-09-21 NOTE — [employer] PATIENT INSTRUCTIONS
Discharge Summary/Instructions after an Endoscopic Procedure  Patient Name: Elva Caceres  Patient MRN: [id number]  Patient YOB: 1980 Monday, September 21, 2020  Dom Mckeon MD  Your health is very important to us during the Covid Crisis. Following your   procedure today, you will receive a daily text for 2 weeks asking about   signs or symptoms of Covid 19.  Please respond to this text when you   receive it so we can follow up and keep you as safe as possible.   RESTRICTIONS:  During your procedure today, you received medications for sedation.  These   medications may affect your judgment, balance and coordination.  Therefore,   for 24 hours, you have the following restrictions:   - DO NOT drive a car, operate machinery, make legal/financial decisions,   sign important papers or drink alcohol.    ACTIVITY:  Today: no heavy lifting, straining or running due to procedural   sedation/anesthesia.  The following day: return to full activity including work.  DIET:  Eat and drink normally unless instructed otherwise.     TREATMENT FOR COMMON SIDE EFFECTS:  - Mild abdominal pain, nausea, belching, bloating or excessive gas:  rest,   eat lightly and use a heating pad.  - Sore Throat: treat with throat lozenges and/or gargle with warm salt   water.  - Because air was used during the procedure, expelling large amounts of air   from your rectum or belching is normal.  - If a bowel prep was taken, you may not have a bowel movement for 1-3 days.    This is normal.  SYMPTOMS TO WATCH FOR AND REPORT TO YOUR PHYSICIAN:  1. Abdominal pain or bloating, other than gas cramps.  2. Chest pain.  3. Back pain.  4. Signs of infection such as: chills or fever occurring within 24 hours   after the procedure.  5. Rectal bleeding, which would show as bright red, maroon, or black stools.   (A tablespoon of blood from the rectum is not serious, especially if   hemorrhoids are present.)  6. Vomiting.  7. Weakness or dizziness.  GO  DIRECTLY TO THE NEAREST EMERGENCY ROOM IF YOU HAVE ANY OF THE FOLLOWING:      Difficulty breathing              Chills and/or fever over 101 F   Persistent vomiting and/or vomiting blood   Severe abdominal pain   Severe chest pain   Black, tarry stools   Bleeding- more than one tablespoon   Any other symptom or condition that you feel may need urgent attention  Your doctor recommends these additional instructions:  If any biopsies were taken, your doctors clinic will contact you in 1 to 2   weeks with any results.  - Discharge patient to home.   - Patient has a contact number available for emergencies.  The signs and   symptoms of potential delayed complications were discussed with the   patient.  Return to normal activities tomorrow.  Written discharge   instructions were provided to the patient.   - Resume previous diet.   - Continue present medications.   - Await pathology results.   - Recommend further ENT eval, as no obvious cause of dysphagia seen on   todays EGD and with normal esophagus.  For questions, problems or results please call your physician - Dom Mckeon MD.  EMERGENCY PHONE NUMBER: [phone],  LAB RESULTS: [phone]  IF A COMPLICATION OR EMERGENCY SITUATION ARISES AND YOU ARE UNABLE TO REACH   YOUR PHYSICIAN - GO DIRECTLY TO THE EMERGENCY ROOM.  Dom Mckeon MD  9/21/2020 3:48:55 PM  This report has been verified and signed electronically.  [employer]

## 2020-09-21 NOTE — CONSULTS
[facility]  Otorhinolaryngology-Head & Neck Surgery  Consult Note    Patient Name: Elva Caceres  MRN: [id number]  Code Status: Full Code  Admission Date: 9/19/2020  Hospital Length of Stay: 0 days  Attending Physician: Pranav Stroud MD  Primary Care Provider: Rob Sorto DO    Patient information was obtained from patient, chart and ER records.     Consults  Subjective:     Chief Complaint/Reason for Admission: dysphagia    History of Present Illness: 40 year old female presented to ED with about 5 days of worsening dysphagia. Reports dysphagia primarily to solids. Feels food gets stuck. Point to left mid-lower neck when asked where. Initially denied trouble with liquids but had event earlier this evening where she felt it wouldn't go down and started choking - came back up out her nose and mouth. Associated shortness of breath. Breathing comfortably now. Has no prior episodes of dysphagia. History of GERD and asthma. Takes omeprazole. GI planning for EGD tomorrow. Also notes hoarseness that started Friday. Denies neck, back or cardiac surgeries. No intubations. No congestion, cough, fever.      Medications:  Continuous Infusions:  Scheduled Meds:   carBAMazepine  100 mg Oral BID    [START ON 9/21/2020] enoxparin  40 mg Subcutaneous Q24H    fluticasone furoate-vilanteroL  1 puff Inhalation Daily    pantoprazole  40 mg Oral BID    senna-docusate 8.6-50 mg  1 tablet Oral BID    sucralfate  1 g Oral Q6H    triamterene-hydrochlorothiazide 37.5-25 mg  1 capsule Oral QAM     PRN Meds:acetaminophen, acetaminophen, dextrose 50%, dextrose 50%, glucagon (human recombinant), glucose, glucose, HYDROcodone-acetaminophen, insulin aspart U-100, ondansetron, sodium chloride 0.9%     No current facility-administered medications on file prior to encounter.      Current Outpatient Medications on File Prior to Encounter   Medication Sig    BREO ELLIPTA 100-25 mcg/dose diskus inhaler TAKE 1 PUFF  BY MOUTH EVERY DAY    carBAMazepine (TEGRETOL XR) 100 MG 12 hr tablet Take 1 tablet (100 mg total) by mouth 2 (two) times daily.    omeprazole (PRILOSEC) 20 MG capsule Take 1 capsule (20 mg total) by mouth 2 (two) times a day.    triamterene-hydrochlorothiazide 37.5-25 mg (DYAZIDE) 37.5-25 mg per capsule TAKE ONE CAPSULE BY MOUTH IN THE MORNING    acetaminophen (TYLENOL) 325 MG tablet Take 1 tablet (325 mg total) by mouth every 6 (six) hours as needed for Pain.    diclofenac sodium (VOLTAREN) 1 % Gel Apply 2 g topically 4 (four) times daily. (Patient not taking: Reported on 2020)    meloxicam (MOBIC) 7.5 MG tablet Take 1 tablet (7.5 mg total) by mouth daily as needed for Pain. Can take 1-2 tabs per day as needed for pain. Do not take more than 2 tabs per day.    ondansetron (ZOFRAN) 8 MG tablet Take 1 tablet (8 mg total) by mouth every 8 (eight) hours as needed for Nausea.    propylene glycol (SYSTANE BALANCE) 0.6 % Drop Apply 1 drop to eye 4 (four) times daily as needed. (Patient not taking: Reported on 2020)    valACYclovir (VALTREX) 1000 MG tablet Take 1 tablet (1,000 mg total) by mouth 3 (three) times daily. for 7 days (Patient not taking: Reported on 2020)       Review of patient's allergies indicates:  No Known Allergies    Past Medical History:   Diagnosis Date    Asthma     GERD with esophagitis     Hypertension     Left ovarian cyst      Past Surgical History:   Procedure Laterality Date    endometrial polypectomy       Family History     None        Tobacco Use    Smoking status: Former Smoker     Quit date: 2020     Years since quittin.1   Substance and Sexual Activity    Alcohol use: Yes     Alcohol/week: 0.0 standard drinks    Drug use: No    Sexual activity: Not on file     Review of Systems   Constitutional: Negative for chills and fever.   HENT: Positive for sore throat (throat pain). Negative for congestion, postnasal drip and rhinorrhea.    Eyes: Negative  for pain and redness.   Respiratory: Positive for shortness of breath. Negative for cough and stridor.    Gastrointestinal: Negative for abdominal pain and nausea.   Musculoskeletal: Positive for neck pain (left). Negative for neck stiffness.   Skin: Negative for color change and rash.   Neurological: Negative for speech difficulty and weakness.   Psychiatric/Behavioral: Negative for confusion. The patient is not nervous/anxious.      Objective:     Vital Signs (Most Recent):  Temp: 99 °F (37.2 °C) (09/20/20 1939)  Pulse: 88 (09/20/20 1939)  Resp: 18 (09/20/20 1939)  BP: (!) 164/96 (09/20/20 1939)  SpO2: 100 % (09/20/20 1939) Vital Signs (24h Range):  Temp:  [97.9 °F (36.6 °C)-99 °F (37.2 °C)] 99 °F (37.2 °C)  Pulse:  [69-90] 88  Resp:  [16-18] 18  SpO2:  [98 %-100 %] 100 %  BP: (144-164)/(63-99) 164/96     Weight: 127.6 kg (281 lb 6.4 oz)  Body mass index is 41.56 kg/m².        Physical Exam  Constitutional:       General: She is not in acute distress.     Appearance: She is obese. She is not ill-appearing or diaphoretic.      Comments: Comfortably resting in bed. Voice with mild dysphonia - slightly rough, no breathiness, not muffled   HENT:      Head: Normocephalic and atraumatic.      Right Ear: External ear normal.      Left Ear: External ear normal.      Nose: Nose normal.      Mouth/Throat:      Mouth: Mucous membranes are moist.      Pharynx: Oropharynx is clear. No oropharyngeal exudate or posterior oropharyngeal erythema.   Eyes:      Extraocular Movements: Extraocular movements intact.      Pupils: Pupils are equal, round, and reactive to light.      Comments: No signs of parapharyngeal edema or significant erythema   Cardiovascular:      Rate and Rhythm: Normal rate and regular rhythm.   Pulmonary:      Effort: Pulmonary effort is normal. No respiratory distress.      Breath sounds: No stridor.   Musculoskeletal:         General: No deformity.   Skin:     General: Skin is warm and dry.   Neurological:       General: No focal deficit present.      Mental Status: She is alert and oriented to person, place, and time.      Cranial Nerves: No cranial nerve deficit.   Psychiatric:         Mood and Affect: Mood normal.         Behavior: Behavior normal.         Judgment: Judgment normal.         Significant Labs:  CBC:   Recent Labs   Lab 09/20/20  0416   WBC 4.59   RBC 4.51   HGB 12.8   HCT 37.8      MCV 84   MCH 28.4   MCHC 33.9     CMP:   Recent Labs   Lab 09/20/20 0416   GLU 99   CALCIUM 8.9   ALBUMIN 3.4*   PROT 7.1   *   K 4.1   CO2 24      BUN 5*   CREATININE 0.7   ALKPHOS 79   ALT 45*   AST 42*   BILITOT 0.6       Significant Diagnostics:  CT Neck  - no abscess or significant edema of parapharyngeal/peritonsillar space. No supgraglottic edema. No mass.    Assessment/Plan:     Vocal cord paresis  Dysphagia  Patient found to have impairment of right vocal cord on flexible laryngoscopy - see procedure note. Her airway is patent. Normal mobility of left cord. She gets good glottic closure. She does have some pooling of secretions in the post cricoid region and piriforms. While vocal cord motion impairment can impact swallowing it usually does not present with the sensation of food getting stuck as patient reporting. More commonly presents with aspiration issues. Would recommend EGD to further evaluate esophagus. Speech evaluation of swallowing to ensure no aspiration. CT Neck with no obvious etiology of cord paralysis. No history to suggest cause either. May be idiopathic. Will follow up after EGD and speech evaluation. Recommend patient just stay NPO tonight due to event earlier.       VTE Risk Mitigation (From admission, onward)         Ordered     enoxaparin injection 40 mg  Every 24 hours (non-standard times)      09/20/20 1001     IP VTE HIGH RISK PATIENT  Once      09/19/20 1658     Place sequential compression device  Until discontinued      09/19/20 1658                Thank you for your  consult.     Danielle Rodríguez MD  Otorhinolaryngology-Head & Neck Surgery  [facility]

## 2020-09-21 NOTE — PT/OT/SLP EVAL
Occupational Therapy   Evaluation and Discharge Note    Name: Elva Caceres  MRN: [id number]  Admitting Diagnosis:  Esophageal dysmotility      Recommendations:     Discharge Recommendations: home  Discharge Equipment Recommendations:  none  Barriers to discharge:  None    Assessment:   Per OT eval, pt  w/o change in fxnl status from baseline (I) & no further OT svcs indicated at this time.  DC acute OT this date. Pt appropriate for d/c home w/ no DME needs.    Elva Caceres is a 40 y.o. female with a medical diagnosis of Esophageal dysmotility. At this time, patient is functioning at their prior level of function and does not require further acute OT services.     Plan:     During this hospitalization, patient does not require further acute OT services.  Please re-consult if situation changes.    · Plan of Care Reviewed with: patient, mother    Subjective     Chief Complaint: difficulty swallowing  Patient/Family Comments/goals: return home    Occupational Profile:  Living Environment: w/ fly in dbl wide w/ 0STE; t/s  Previous level of function: (I)  Roles and Routines: standing tub showers; IADLs; driving; works in customer [employer]  Equipment Used at home:  none  Assistance upon Discharge: fly    Pain/Comfort:  · Pain Rating 1: (unrated)  · Location - Orientation 1: generalized  · Location 1: throat  · Pain Addressed 1: Distraction  · Pain Rating Post-Intervention 1: (unrated)    Patients cultural, spiritual, Sikhism conflicts given the current situation:      Objective:     Communicated with: nscarroll prior to session.  Patient found up in chair with peripheral IV upon OT entry to room.    General Precautions: Standard,     Orthopedic Precautions:N/A   Braces: N/A     Occupational Performance:    Bed Mobility:    ·     Functional Mobility/Transfers:  · Patient completed Sit <> Stand Transfer with independence  with  no assistive device   · Patient completed Bed <> Chair Transfer using Step Transfer technique with  independence with no assistive device  · Functional Mobility: w/o DME w/in room (I)ly    Activities of Daily Living:  · Lower Body Dressing: independence doff/don socks via figure 4 tech    Cognitive/Visual Perceptual:  AO4  WFL    Physical Exam:  BUEs WFL at 4+/5    Sit balance: G  Stand balance: G    *hoarse vocal quality    AMPAC 6 Click ADL:  AMPAC Total Score: 24    Treatment & Education:  Pt found [facility] & agreeable to OT eval this AM.  Pt c/o unrated pain at throat & perf all eval tasks (I)ly.  Edu/tx re: general safety techs & HEP.  Pt/mother verbalized understanding.  Pt left sitting EOB w/ nsg notified.    Patient left sitting EOB with all lines intact, nsg notified and mother present    GOALS:   Multidisciplinary Problems     Occupational Therapy Goals     Not on file          Multidisciplinary Problems (Resolved)        Problem: Occupational Therapy Goal    Goal Priority Disciplines Outcome Interventions   Occupational Therapy Goal   (Resolved)     OT, PT/OT Met                    History:     Past Medical History:   Diagnosis Date    Asthma     GERD with esophagitis     Hypertension     Left ovarian cyst        Past Surgical History:   Procedure Laterality Date    endometrial polypectomy         Time Tracking:     OT Date of Treatment: 09/21/20  OT Start Time: 0855  OT Stop Time: 0903  OT Total Time (min): 8 min    Billable Minutes:Evaluation 8  Total Time 8    ARTEM Marte  9/21/2020

## 2020-09-21 NOTE — PLAN OF CARE
Pt oriented. DCA done at bedside with patient. Demographics/Ins/NextofKin/PCP verified with patient/family and updated as needed. Denies any DME needs at present from pt/family. Patient/family plans to return home with family. Educated on bedside RX. Patient consents for [place] to discuss discharge plan with next of kin. Preferences appointment times and location obtained. Patient reports they will have transportation home upon discharge.    No future appointments.    This  put name on white board and explained blue discharge folder to patient. Discharge planning brochure and/or business card given to patient.  Patient verbalized understanding.       09/21/20 1210   Discharge Assessment   Assessment Type Discharge Planning Assessment   Confirmed/corrected address and phone number on facesheet? Yes   Assessment information obtained from? Patient   Communicated expected length of stay with patient/caregiver yes   Prior to hospitilization cognitive status: Alert/Oriented;No Deficits   Prior to hospitalization functional status: Independent   Current cognitive status: Alert/Oriented;No Deficits   Current Functional Status: Independent   Lives With alone   Able to Return to Prior Arrangements yes   Is patient able to care for self after discharge? Yes   Who are your caregiver(s) and their phone number(s)? Independant - Mother Reva   Patient's perception of discharge disposition home or selfcare   Patient currently being followed by outpatient case management? No   Equipment Currently Used at Home none   Do you have any problems affording any of your prescribed medications? No   Is the patient taking medications as prescribed? yes   Does the patient have transportation home? Yes   Transportation Anticipated family or friend will provide   Does the patient receive services at the [facility]? No   Discharge Plan A Home   DME Needed Upon Discharge  none     Reina Mckeon RN  RN Transition  Navigator  [phone]

## 2020-09-21 NOTE — ASSESSMENT & PLAN NOTE
3 days of esophageal dysphagia associated with solids  CT soft tissue of neck showed possible enlargement and early inflammation of the right tonsillar pillar w/ prominent lymph nodes anterior to the distal trachea in superior mediastinum.  CXR no sign of aspiration    Plan:  - GI consult, recs appreciated  - PPI BID  - carafate (liquid) QID  - clear liquid diet  - swallow study pending  - EGD this AM

## 2020-09-21 NOTE — HPI
40 year old female presented to ED with about 5 days of worsening dysphagia. Reports dysphagia primarily to solids. Feels food gets stuck. Point to left mid-lower neck when asked where. Initially denied trouble with liquids but had event earlier this evening where she felt it wouldn't go down and started choking - came back up out her nose and mouth. Associated shortness of breath. Breathing comfortably now. Has no prior episodes of dysphagia. History of GERD and asthma. Takes omeprazole. GI planning for EGD tomorrow. Also notes hoarseness that started Friday. Denies neck, back or cardiac surgeries. No intubations. No congestion, cough, fever.

## 2020-09-21 NOTE — ANESTHESIA PREPROCEDURE EVALUATION
09/21/2020  Elva Caceres is a 40 y.o., female admitted with dysphagia for EGD under MAC    Past Medical History:   Diagnosis Date    Asthma     GERD with esophagitis     Hypertension     Left ovarian cyst      Past Surgical History:   Procedure Laterality Date    endometrial polypectomy           Anesthesia Evaluation    I have reviewed the Patient Summary Reports.   I have reviewed the NPO Status.   I have reviewed the Medications.     Review of Systems  Anesthesia Hx:   Denies Personal Hx of Anesthesia complications.   Social:  Former Smoker    Cardiovascular:   Hypertension    Pulmonary:   Asthma Recent URI, unresolved    Hepatic/GI:   GERD        Physical Exam  General:  Morbid Obesity    Airway/Jaw/Neck:  Airway Findings: Mallampati: II      Chest/Lungs:  Chest/Lungs Clear    Heart/Vascular:  Heart Findings: Normal          Lab Results   Component Value Date    WBC 4.21 09/21/2020    HGB 13.3 09/21/2020    HCT 38.6 09/21/2020     09/21/2020    CHOL 207 (H) 09/11/2020    TRIG 76 09/11/2020    HDL 52 09/11/2020    ALT 49 (H) 09/21/2020    AST 46 (H) 09/21/2020     (L) 09/21/2020    K 3.9 09/21/2020     09/21/2020    CREATININE 0.7 09/21/2020    BUN 5 (L) 09/21/2020    CO2 22 (L) 09/21/2020    TSH 0.870 09/19/2020    HGBA1C 5.6 09/19/2020         Anesthesia Plan  Type of Anesthesia, risks & benefits discussed:  Anesthesia Type:  MAC, general  Patient's Preference:   Intra-op Monitoring Plan: standard ASA monitors  Intra-op Monitoring Plan Comments:   Post Op Pain Control Plan:   Post Op Pain Control Plan Comments:   Induction:    Beta Blocker:  Patient is not currently on a Beta-Blocker (No further documentation required).       Informed Consent: Patient understands risks and agrees with Anesthesia plan.  Questions answered. Anesthesia consent signed with patient.  ASA Score: 2      Day of Surgery Review of History & Physical:            Ready For Surgery From Anesthesia Perspective.

## 2020-09-22 ENCOUNTER — TELEPHONE (OUTPATIENT)
Dept: OTOLARYNGOLOGY | Facility: CLINIC | Age: 40
End: 2020-09-22

## 2020-09-22 LAB
ALBUMIN SERPL BCP-MCNC: 3.9 G/DL (ref 3.5–5.2)
ALP SERPL-CCNC: 87 U/L (ref 55–135)
ALT SERPL W/O P-5'-P-CCNC: 58 U/L (ref 10–44)
ANION GAP SERPL CALC-SCNC: 15 MMOL/L (ref 8–16)
AORTIC ROOT ANNULUS: 3.25 CM
ASCENDING AORTA: 2.75 CM
AST SERPL-CCNC: 57 U/L (ref 10–40)
AV INDEX (PROSTH): 0.58
AV MEAN GRADIENT: 4 MMHG
AV PEAK GRADIENT: 6 MMHG
AV VALVE AREA: 2.68 CM2
AV VELOCITY RATIO: 0.61
BASOPHILS # BLD AUTO: 0.06 K/UL (ref 0–0.2)
BASOPHILS NFR BLD: 1.3 % (ref 0–1.9)
BILIRUB SERPL-MCNC: 0.6 MG/DL (ref 0.1–1)
BSA FOR ECHO PROCEDURE: 2.4 M2
BUN SERPL-MCNC: 7 MG/DL (ref 6–20)
CALCIUM SERPL-MCNC: 9.8 MG/DL (ref 8.7–10.5)
CHLORIDE SERPL-SCNC: 98 MMOL/L (ref 95–110)
CO2 SERPL-SCNC: 22 MMOL/L (ref 23–29)
CREAT SERPL-MCNC: 0.8 MG/DL (ref 0.5–1.4)
CV ECHO LV RWT: 0.46 CM
DIFFERENTIAL METHOD: ABNORMAL
DOP CALC AO PEAK VEL: 1.18 M/S
DOP CALC AO VTI: 22.21 CM
DOP CALC LVOT AREA: 4.6 CM2
DOP CALC LVOT DIAMETER: 2.42 CM
DOP CALC LVOT PEAK VEL: 0.72 M/S
DOP CALC LVOT STROKE VOLUME: 59.53 CM3
DOP CALCLVOT PEAK VEL VTI: 12.95 CM
E WAVE DECELERATION TIME: 92.38 MSEC
E/A RATIO: 0.9
E/E' RATIO: 6.59 M/S
ECHO LV POSTERIOR WALL: 1.02 CM (ref 0.6–1.1)
EOSINOPHIL # BLD AUTO: 0.1 K/UL (ref 0–0.5)
EOSINOPHIL NFR BLD: 2.7 % (ref 0–8)
ERYTHROCYTE [DISTWIDTH] IN BLOOD BY AUTOMATED COUNT: 14.1 % (ref 11.5–14.5)
EST. GFR  (AFRICAN AMERICAN): >60 ML/MIN/1.73 M^2
EST. GFR  (NON AFRICAN AMERICAN): >60 ML/MIN/1.73 M^2
FRACTIONAL SHORTENING: 32 % (ref 28–44)
GLUCOSE SERPL-MCNC: 88 MG/DL (ref 70–110)
HCT VFR BLD AUTO: 42.6 % (ref 37–48.5)
HGB BLD-MCNC: 14.8 G/DL (ref 12–16)
IMM GRANULOCYTES # BLD AUTO: 0.03 K/UL (ref 0–0.04)
IMM GRANULOCYTES NFR BLD AUTO: 0.6 % (ref 0–0.5)
INTERVENTRICULAR SEPTUM: 1.13 CM (ref 0.6–1.1)
LA MAJOR: 4.14 CM
LA MINOR: 4.13 CM
LA WIDTH: 2.98 CM
LEFT ATRIUM SIZE: 3.56 CM
LEFT ATRIUM VOLUME INDEX: 16.1 ML/M2
LEFT ATRIUM VOLUME: 37.29 CM3
LEFT INTERNAL DIMENSION IN SYSTOLE: 3.04 CM (ref 2.1–4)
LEFT VENTRICLE DIASTOLIC VOLUME INDEX: 38.7 ML/M2
LEFT VENTRICLE DIASTOLIC VOLUME: 89.36 ML
LEFT VENTRICLE MASS INDEX: 72 G/M2
LEFT VENTRICLE SYSTOLIC VOLUME INDEX: 15.7 ML/M2
LEFT VENTRICLE SYSTOLIC VOLUME: 36.18 ML
LEFT VENTRICULAR INTERNAL DIMENSION IN DIASTOLE: 4.44 CM (ref 3.5–6)
LEFT VENTRICULAR MASS: 165.89 G
LV LATERAL E/E' RATIO: 4.31 M/S
LV SEPTAL E/E' RATIO: 14 M/S
LYMPHOCYTES # BLD AUTO: 1.1 K/UL (ref 1–4.8)
LYMPHOCYTES NFR BLD: 22.8 % (ref 18–48)
MAGNESIUM SERPL-MCNC: 1.8 MG/DL (ref 1.6–2.6)
MCH RBC QN AUTO: 28.7 PG (ref 27–31)
MCHC RBC AUTO-ENTMCNC: 34.7 G/DL (ref 32–36)
MCV RBC AUTO: 83 FL (ref 82–98)
MONOCYTES # BLD AUTO: 0.6 K/UL (ref 0.3–1)
MONOCYTES NFR BLD: 12.2 % (ref 4–15)
MV PEAK A VEL: 0.62 M/S
MV PEAK E VEL: 0.56 M/S
MV STENOSIS PRESSURE HALF TIME: 26.79 MS
MV VALVE AREA P 1/2 METHOD: 8.21 CM2
NEUTROPHILS # BLD AUTO: 2.9 K/UL (ref 1.8–7.7)
NEUTROPHILS NFR BLD: 60.4 % (ref 38–73)
NRBC BLD-RTO: 0 /100 WBC
PHOSPHATE SERPL-MCNC: 3.8 MG/DL (ref 2.7–4.5)
PLATELET # BLD AUTO: 306 K/UL (ref 150–350)
PMV BLD AUTO: 10.4 FL (ref 9.2–12.9)
POCT GLUCOSE: 70 MG/DL (ref 70–110)
POCT GLUCOSE: 78 MG/DL (ref 70–110)
POCT GLUCOSE: 84 MG/DL (ref 70–110)
POTASSIUM SERPL-SCNC: 3.4 MMOL/L (ref 3.5–5.1)
PROT SERPL-MCNC: 8.3 G/DL (ref 6–8.4)
PULM VEIN S/D RATIO: 1.48
PV PEAK D VEL: 0.46 M/S
PV PEAK S VEL: 0.68 M/S
PV PEAK VELOCITY: 1.12 CM/S
RA MAJOR: 3.62 CM
RA PRESSURE: 3 MMHG
RA WIDTH: 2.95 CM
RBC # BLD AUTO: 5.15 M/UL (ref 4–5.4)
RIGHT VENTRICULAR END-DIASTOLIC DIMENSION: 3.05 CM
SODIUM SERPL-SCNC: 135 MMOL/L (ref 136–145)
STJ: 2.59 CM
TDI LATERAL: 0.13 M/S
TDI SEPTAL: 0.04 M/S
TDI: 0.09 M/S
TRICUSPID ANNULAR PLANE SYSTOLIC EXCURSION: 1.27 CM
WBC # BLD AUTO: 4.74 K/UL (ref 3.9–12.7)

## 2020-09-22 PROCEDURE — 85025 COMPLETE CBC W/AUTO DIFF WBC: CPT

## 2020-09-22 PROCEDURE — 11000001 HC ACUTE MED/SURG PRIVATE ROOM

## 2020-09-22 PROCEDURE — 94761 N-INVAS EAR/PLS OXIMETRY MLT: CPT

## 2020-09-22 PROCEDURE — G0378 HOSPITAL OBSERVATION PER HR: HCPCS

## 2020-09-22 PROCEDURE — 94640 AIRWAY INHALATION TREATMENT: CPT

## 2020-09-22 PROCEDURE — 80053 COMPREHEN METABOLIC PANEL: CPT

## 2020-09-22 PROCEDURE — 25000003 PHARM REV CODE 250: Performed by: STUDENT IN AN ORGANIZED HEALTH CARE EDUCATION/TRAINING PROGRAM

## 2020-09-22 PROCEDURE — 97802 MEDICAL NUTRITION INDIV IN: CPT

## 2020-09-22 PROCEDURE — 36415 COLL VENOUS BLD VENIPUNCTURE: CPT

## 2020-09-22 PROCEDURE — 83735 ASSAY OF MAGNESIUM: CPT

## 2020-09-22 PROCEDURE — 63600175 PHARM REV CODE 636 W HCPCS: Performed by: STUDENT IN AN ORGANIZED HEALTH CARE EDUCATION/TRAINING PROGRAM

## 2020-09-22 PROCEDURE — 92610 EVALUATE SWALLOWING FUNCTION: CPT

## 2020-09-22 PROCEDURE — 84100 ASSAY OF PHOSPHORUS: CPT

## 2020-09-22 RX ORDER — POTASSIUM CHLORIDE 1.5 G/1.58G
40 POWDER, FOR SOLUTION ORAL EVERY 4 HOURS
Status: DISPENSED | OUTPATIENT
Start: 2020-09-22 | End: 2020-09-22

## 2020-09-22 RX ADMIN — FLUTICASONE FUROATE AND VILANTEROL TRIFENATATE 1 PUFF: 100; 25 POWDER RESPIRATORY (INHALATION) at 08:09

## 2020-09-22 RX ADMIN — HYDROCODONE BITARTRATE AND ACETAMINOPHEN 1 TABLET: 5; 325 TABLET ORAL at 03:09

## 2020-09-22 RX ADMIN — PANTOPRAZOLE SODIUM 40 MG: 40 TABLET, DELAYED RELEASE ORAL at 09:09

## 2020-09-22 RX ADMIN — CARBAMAZEPINE 100 MG: 100 CAPSULE, EXTENDED RELEASE ORAL at 09:09

## 2020-09-22 RX ADMIN — DOCUSATE SODIUM - SENNOSIDES 1 TABLET: 50; 8.6 TABLET, FILM COATED ORAL at 09:09

## 2020-09-22 RX ADMIN — POTASSIUM CHLORIDE 40 MEQ: 1.5 FOR SOLUTION ORAL at 03:09

## 2020-09-22 RX ADMIN — ENOXAPARIN SODIUM 40 MG: 40 INJECTION SUBCUTANEOUS at 09:09

## 2020-09-22 RX ADMIN — SUCRALFATE 1 G: 1 SUSPENSION ORAL at 03:09

## 2020-09-22 NOTE — PLAN OF CARE
Problem: SLP Goal  Goal: SLP Goal  Description: Short Term Goals:  1. Pt will participate in swallow eval to determine safest diet level.  2. Pt will tolerate mech soft and thin liquids with no outward oral, pharynegal or esophageal issues.   Outcome: Ongoing, Progressing   Pt safe to advance to mech soft and thin liquids, pt with continued vocal dysphonia, OP SLP services are recommended to address dysphonia. Recommendations discussed with treating MD.

## 2020-09-22 NOTE — SUBJECTIVE & OBJECTIVE
Interval History: NAEON. Pt reports persistent secretions and fullness in neck. Did not attempt to try clear liquids    Review of Systems   Constitutional: Negative for chills and fever.   HENT: Positive for sore throat (throat pain). Negative for congestion, postnasal drip and rhinorrhea.    Eyes: Negative for pain and redness.   Respiratory: Negative for cough, shortness of breath and stridor.    Gastrointestinal: Negative for abdominal pain and nausea.   Genitourinary: Negative for dysuria and urgency.   Musculoskeletal: Positive for neck pain (left). Negative for neck stiffness.   Skin: Negative for color change and rash.   Neurological: Negative for speech difficulty and weakness.   Psychiatric/Behavioral: Negative for confusion. The patient is not nervous/anxious.      Objective:     Vital Signs (Most Recent):  Temp: 98.2 °F (36.8 °C) (09/22/20 0506)  Pulse: 82 (09/22/20 0506)  Resp: 20 (09/22/20 0506)  BP: (!) 143/83 (09/22/20 0506)  SpO2: 98 % (09/22/20 0506) Vital Signs (24h Range):  Temp:  [97.4 °F (36.3 °C)-98.7 °F (37.1 °C)] 98.2 °F (36.8 °C)  Pulse:  [] 82  Resp:  [14-22] 20  SpO2:  [95 %-100 %] 98 %  BP: (143-169)/() 143/83     Weight: 118.1 kg (260 lb 5.8 oz)  Body mass index is 38.45 kg/m².  No intake or output data in the 24 hours ending 09/22/20 0658   Physical Exam  Vitals signs and nursing note reviewed.   Constitutional:       General: She is not in acute distress.     Appearance: She is obese. She is not ill-appearing or diaphoretic.      Comments: Comfortably resting in bed. Voice with mild dysphonia - on RA   HENT:      Head: Normocephalic and atraumatic.      Right Ear: External ear normal.      Left Ear: External ear normal.      Nose: Nose normal.      Mouth/Throat:      Mouth: Mucous membranes are moist.      Pharynx: Oropharynx is clear. No oropharyngeal exudate or posterior oropharyngeal erythema.   Eyes:      Extraocular Movements: Extraocular movements intact.      Pupils:  Pupils are equal, round, and reactive to light.   Cardiovascular:      Rate and Rhythm: Normal rate and regular rhythm.      Pulses: Normal pulses.      Heart sounds: Normal heart sounds. No murmur.   Pulmonary:      Effort: Pulmonary effort is normal. No respiratory distress.      Breath sounds: No stridor.   Abdominal:      General: Abdomen is flat.      Palpations: Abdomen is soft.   Musculoskeletal:         General: No deformity.   Skin:     General: Skin is warm and dry.      Capillary Refill: Capillary refill takes less than 2 seconds.   Neurological:      General: No focal deficit present.      Mental Status: She is alert and oriented to person, place, and time.      Cranial Nerves: No cranial nerve deficit.   Psychiatric:         Mood and Affect: Mood normal.         Behavior: Behavior normal.         Judgment: Judgment normal.         Significant Labs:   A1C:   Recent Labs   Lab 09/11/20  1032 09/19/20  1736   HGBA1C 5.8* 5.6     CBC:   Recent Labs   Lab 09/21/20  0329 09/22/20  0545   WBC 4.21 4.74   HGB 13.3 14.8   HCT 38.6 42.6    306     CMP:   Recent Labs   Lab 09/21/20  0329 09/22/20  0545   * 135*   K 3.9 3.4*    98   CO2 22* 22*   GLU 81 88   BUN 5* 7   CREATININE 0.7 0.8   CALCIUM 9.2 9.8   PROT 7.5 8.3   ALBUMIN 3.6 3.9   BILITOT 0.5 0.6   ALKPHOS 81 87   AST 46* 57*   ALT 49* 58*   ANIONGAP 12 15   EGFRNONAA >60 >60     TSH:   Recent Labs   Lab 09/19/20  1118   TSH 0.870       Significant Imaging: I have reviewed all pertinent imaging results/findings within the past 24 hours.

## 2020-09-22 NOTE — PT/OT/SLP EVAL
Speech Language Pathology Evaluation  Bedside Swallow    Patient Name:  Elva Caceres   MRN:  [id number]  Admitting Diagnosis: Esophageal dysmotility    Recommendations:                 General Recommendations:  Monitor with PO diet to ensure safety  Diet recommendations:  Mechanical soft, Thin   Aspiration Precautions: 1 bite/sip at a time, Alternating bites/sips, HOB to 90 degrees, Meds whole 1 at a time, Remain upright 30 minutes post meal, Small bites/sips and Standard aspiration precautions   General Precautions: Standard, fall  Communication strategies:  none    History:   Principal Problem:Esophageal dysmotility     Chief Complaint:        Chief Complaint   Patient presents with    Dysphagia       pt. c/o unable to eat or drink for 5 days secondary to difficulty swallowing. pt. mas muffled voice. associatd symptom has been sob         HPI: Pt is a a 40 y.o. female with PMHx of tonsillectomy and HTN who presents to the ED with complaint of throat pain and dysphagia for 3 days. Dysphagia is associated with solids not liquids. She reports one episode of emesis after drinking a milkshake yesterday since then onset of wheezing. Symptoms are persistent but not worsening. Pt reports feelings of food getting stuck in her throat. She has had difficulty taking her daily medications. Associated symptoms include shortness of breath and feeling of muffled voice. She reports that this has never happened before and no sick contacts.  No associated rhinorrhea. Pt reported attempted to take her inhaler to alleviate symptoms but no improvement. No drooling and no issues with secretions/liquids.      In ED, CT soft tissue of neck showed possible enlargement and early inflammation of the right tonsillar pillar w/ prominent lymph nodes anterior to the distal trachea in superior mediastinum. concern for aspiration so CXR was performed. No sign of aspiration. Labs showed hyponatremia. COVID neg. ENT and Gi were consulted. ENT  "reported no further  Evaluation. GI reported plan to scope patient and to place her on a liquid diet with PPI and liquid carafate for now.       Past Medical History:   Diagnosis Date    Asthma     GERD with esophagitis     Hypertension     Left ovarian cyst        Past Surgical History:   Procedure Laterality Date    endometrial polypectomy      ESOPHAGOGASTRODUODENOSCOPY N/A 9/21/2020    Procedure: EGD (ESOPHAGOGASTRODUODENOSCOPY);  Surgeon: Dom Mckeon MD;  Location: [facility];  Service: Endoscopy;  Laterality: N/A;       Social History: Patient lives at home, indep with ADLs.    Prior Intubation HX:  n/a    Modified Barium Swallow: none per EMR    Chest X-Rays: No large focal consolidation seen.  There is no pneumothorax.    Prior diet: regular diet and thin liquids at home. .    Subjective     Consult received for clinical swallow eval this date, SLP did communicate with RN prior to eval/treat.    Patient goals: "My voice is going out."    Pain/Comfort:  Pain Rating 1: 0/10    Objective:   Pt seen in room for swallow eval, no family present.   Pt has been on clears since EGD completed.   Pt alert, oriented x4, follows commands. She is reporting some discomfort when talking.   Pt's voice remains hoarse, breathy and reduced in loudness.     Oral Musculature Evaluation  Oral Musculature: WFL  Dentition: present and adequate  Secretion Management: adequate  Mucosal Quality: adequate  Mandibular Strength and Mobility: WFL  Oral Labial Strength and Mobility: WFL  Lingual Strength and Mobility: WFL  Velar Elevation: WFL  Buccal Strength and Mobility: WFL  Volitional Cough: elicited  Volitional Swallow: timely swallow palpated  Voice Prior to PO Intake: hoarse, dysphonia present    Bedside Swallow Eval:   Consistencies Assessed:  · Thin liquids water by straw, pudding and diced peaches by spoon    Oral Phase:   · WFL   · Timely mastication  · Adequate bolus control/formation     Pharyngeal Phase:   · no " overt clinical signs/symptoms of aspiration  · no overt clinical signs/symptoms of pharyngeal dysphagia   · Swallow appears timely per palpation  · No wet voice and no choking instances    Compensatory Strategies  · Alternate sips and bites  · Slow rate     Treatment: monitor with PO intake and ensure safety    Assessment:     Elva Caceres is a 40 y.o. female admitted with Esophageal dysmotility who presents with an SLP diagnosis of no outward s/s of oral or pharyngeal dysfunction. Pt may initiate mech soft and thin liquid diet.  Swallow precautions discussed with pt.  SLP also sent secure chat to treatment team.   Pt will need OP voice remediation due to hoarseness.       Goals:   Multidisciplinary Problems     SLP Goals        Problem: SLP Goal    Goal Priority Disciplines Outcome   SLP Goal     SLP Ongoing, Progressing   Description: Short Term Goals:  1. Pt will participate in swallow eval to determine safest diet level.  2. Pt will tolerate mech soft and thin liquids with no outward oral, pharynegal or esophageal issues.                    Plan:     · Patient to be seen:  2 x/week   · Plan of Care expires:  10/21/20  · Plan of Care reviewed with:  patient   · SLP Follow-Up:  Yes       Discharge recommendations:  outpatient speech therapy   Barriers to Discharge:  None    Time Tracking:     SLP Treatment Date:   09/22/20  Speech Start Time:  0924  Speech Stop Time:  0942     Speech Total Time (min):  18 min    Billable Minutes: Eval Swallow and Oral Function 18    ADRIANA Pires, CCC-SLP  09/22/2020

## 2020-09-22 NOTE — CONSULTS
[facility] NEUROLOGY CONSULT  EVALUATION    Reason for consult:  dysphagia  Informant:  patient    Other sources of information : mother, chart review    CC:  Dysphagia (pt. c/o unable to eat or drink for 5 days secondary to difficulty swallowing. pt. mas muffled voice. associatd symptom has been sob)       HPI: Elva Caceres is a 40 y.o. female with  has a past medical history of Asthma, GERD with esophagitis, Hypertension, and Left ovarian cyst. Hx of possible trigeminal neuralgia vs herpetic neuralgia (given tegretol and acyclovir) back on 9/2/20.     States that she was in [facility], no recent illnesses or fevers or other unexplained symptoms, when she noted worsening of her acid reflux back in August 2020. States symptoms persisted, then she went to ED for evaluation of this R sided facial numbness/pain? (near eyes, face, jaw - all on the right). Had associated diffuse headache at that time, was diagnosed in ED for trigeminal neuralgia vs herpetic neuralgia and discharged from ED on tegretol and acylclovir. States this went away.     Approximately 1 week ago, states she has been having difficulty swallowing solids (not with liquids). States there is associated intermittent pain that is localizated, and she has a hoarse voice present.     Since being in the hospital, has been evaluated by ENT and GI. Diagnosed with vocal cord paralysis, with etiology unknown and can be idopathic in about 30% of cases. To have outpatient followup with outpatient ST and monitor.     Upon questioning by neurology, patient denied any other neurologic symptoms such as headaches, acute vision changes, changes in weakness and sensation/numbness.     Endorses prior use of smoking marijuana, no tobacco use.     Outpatient Neurologist: none    ROS:   12pt ROS negative other then mentioned above    Histories:     Allergies:  Patient has no known allergies.    Current Medications:    Current Facility-Administered Medications   Medication Dose Route  Frequency Provider Last Rate Last Dose    acetaminophen tablet 650 mg  650 mg Oral Q4H PRN Bernabe Harrison MD        acetaminophen tablet 650 mg  650 mg Oral Q8H PRN Bernabe Harrison MD        carBAMazepine 12 hr capsule 100 mg  100 mg Oral BID Bernabe Harrison MD   100 mg at 09/22/20 0903    dextrose 50% injection 12.5 g  12.5 g Intravenous PRN Bernabe Harrison MD        dextrose 50% injection 25 g  25 g Intravenous PRN Bernabe Harrison MD        enoxaparin injection 40 mg  40 mg Subcutaneous Q24H Yanique García MD   40 mg at 09/22/20 0903    fluticasone furoate-vilanteroL 100-25 mcg/dose diskus inhaler 1 puff  1 puff Inhalation Daily Bernabe Harrison MD   1 puff at 09/22/20 0837    fluticasone propionate 50 mcg/actuation nasal spray 100 mcg  2 spray Each Nostril Daily Bernabe Harrison MD        glucagon (human recombinant) injection 1 mg  1 mg Intramuscular PRN Bernabe Harrison MD        glucose chewable tablet 16 g  16 g Oral PRN Bernabe Harrison MD        glucose chewable tablet 24 g  24 g Oral PRN Bernabe Harrison MD        HYDROcodone-acetaminophen 5-325 mg per tablet 1 tablet  1 tablet Oral Q6H PRN Bernabe Harrison MD   1 tablet at 09/21/20 2111    insulin aspart U-100 pen 1-10 Units  1-10 Units Subcutaneous QID (AC + HS) PRN Bernabe Harrison MD        ondansetron disintegrating tablet 8 mg  8 mg Oral Q6H PRN Bernabe Harrison MD        pantoprazole EC tablet 40 mg  40 mg Oral BID Bernabe Harrison MD   40 mg at 09/22/20 0903    potassium chloride packet 40 mEq  40 mEq Oral Q4H Bernabe Harrison MD        senna-docusate 8.6-50 mg per tablet 1 tablet  1 tablet Oral BID Bernabe Harrison MD   1 tablet at 09/22/20 0903    sodium chloride 0.9% flush 5 mL  5 mL Intravenous PRN Bernabe Harrison MD        sucralfate 100 mg/mL suspension 1 g  1 g Oral Q6H Yanique García MD   1 g at 09/21/20 2341    triamterene-hydrochlorothiazide 37.5-25 mg per capsule 2 capsule  2 capsule Oral KIMBER Harrison MD             Past  Medical/Surgical/Family/Social History:  PMHx:   Past Medical History:   Diagnosis Date    Asthma     GERD with esophagitis     Hypertension     Left ovarian cyst       Surgeries:   Past Surgical History:   Procedure Laterality Date    endometrial polypectomy      ESOPHAGOGASTRODUODENOSCOPY N/A 2020    Procedure: EGD (ESOPHAGOGASTRODUODENOSCOPY);  Surgeon: Dom Mckeon MD;  Location: [facility];  Service: Endoscopy;  Laterality: N/A;      Family  Hx: No family history on file.   Social Hx:   Social History     Tobacco Use    Smoking status: Former Smoker     Quit date: 2020     Years since quittin.2   Substance Use Topics    Alcohol use: Yes     Alcohol/week: 0.0 standard drinks    Drug use: No         Current Evaluation:     Vital Signs:   Vitals:    20 0845   BP: (!) 167/105   Pulse: (!) 116   Resp: 17   Temp: 98.4 °F (36.9 °C)        General Exam  No apparent distress  Orientation  Alert, awake, oriented to self, place, time, and situation.  Memory  Recent and remote memory intact.  Language  Hoarse speech. No dysarthria, No aphasia.   Cranial Nerves  PERRL, VF intact, EOMI (no fatiguable weakness on ocular upgaze), V1-V3 intact, symmetric facial expression, hearing grossly intact, SCM & TPZ 5/5, tongue midline, symmetric palate elevation.  Voice is hoarse, weak, and somewhat nasal.  Motor  Normal Bulk, Normal Tone  4/5 bilateral proximal weakness of deltoids and hip flexion and extension.  When repeated by attending physician, 5/5  Otherwise 5/5 in upper bilateral biceps, triceps, wrist flexion/extension, finger adduction/abduction  5/5 in lower bilateral knee flexion/extension, ankle dorsiflexion/plantarflexion  No atrophy or fasiculations  Normal tone  Sensory  Normal to light touch throughout  Romberg Negative  DTR  Upper Extremities:  +2/4, symmetric  Lower Extremities: Patellar and Achilles +2/4 and symmetric  Cerebellar/Gait  Normal finger to nose and heel to shin.   Normal  gait and stance.       LABORATORY STUDIES:  Labs:  Recent Labs   Lab 09/20/20  0416 09/21/20  0329 09/22/20  0545   WBC 4.59 4.21 4.74   HGB 12.8 13.3 14.8   HCT 37.8 38.6 42.6    278 306   MCV 84 83 83       Recent Labs   Lab 09/20/20  0416 09/21/20  0329 09/22/20  0545   * 134* 135*   K 4.1 3.9 3.4*    100 98   CO2 24 22* 22*   BUN 5* 5* 7   GLU 99 81 88   CALCIUM 8.9 9.2 9.8   PROT 7.1 7.5 8.3   ALBUMIN 3.4* 3.6 3.9   BILITOT 0.6 0.5 0.6   AST 42* 46* 57*   ALKPHOS 79 81 87   ALT 45* 49* 58*       Recent Labs   Lab 09/19/20  1118 09/20/20  0416 09/21/20  0329 09/22/20  0545   GLU 99 99 81 88       Recent Labs   Lab 09/19/20  1736   HGBA1C 5.6       Thyroid:   Recent Labs   Lab 09/19/20  1118   TSH 0.870     RADIOLOGY STUDIES:   Imaging Results          X-Ray Chest AP Portable (Final result)  Result time 09/19/20 15:29:00    Final result by Russ Moon MD (09/19/20 15:29:00)                 Impression:      1. No acute cardiopulmonary process, shallow inspiratory effort.      Electronically signed by: Russ Moon MD  Date:    09/19/2020  Time:    15:29             Narrative:    EXAMINATION:  XR CHEST AP PORTABLE    CLINICAL HISTORY:  Concern for aspiration;    TECHNIQUE:  Single frontal view of the chest was performed.    COMPARISON:  08/07/2020    FINDINGS:  The cardiomediastinal silhouette is not enlarged.  There is no pleural effusion.  The trachea is midline.  The lungs are symmetrically expanded bilaterally with mildly coarse interstitial attenuation, accentuated by shallow inspiratory effort..  No large focal consolidation seen.  There is no pneumothorax.  The osseous structures are remarkable for degenerative changes of the spine..  There is a questionable calcified granuloma or calcified lymph node along the medial aspect of the left upper lobe versus artifact.                               CT Soft Tissue Neck With Contrast (Final result)  Result time 09/19/20 12:43:30     Final result by Sean Love MD (09/19/20 12:43:30)                 Impression:      Possible enlargement and early inflammation of the right tonsillar pillar.    Prominent lymph nodes seen anterior to the distal trachea in the superior mediastinum.      Electronically signed by: Sean Love  Date:    09/19/2020  Time:    12:43             Narrative:    EXAMINATION:  CT SOFT TISSUE NECK WITH CONTRAST    CLINICAL HISTORY:  Neck mass, solitary, afebrile (Age => 16y);anterior neck pain, dyspnea, dysphagia;    TECHNIQUE:  Low dose axial images, coronal and sagittal reformations were obtained from the skull base to the lung bases following the intravenous administration of 75 mL of Omnipaque 350.    COMPARISON:  None.    FINDINGS:  Orbits, paranasal sinuses, and skull base: Normal.    Nasopharynx: Normal.    Suprahyoid neck: Normal oropharynx, oral cavity, there appears to be a vague area of soft tissue fullness with a central region of hypo attenuation seen to the right of the midline on image number 36 of series 2.  This possibly represents inflammation of the tonsillar pillar however there does not appear to be evidence of enhancement peripherally that is well delineated.  Also as suggested the central aspect is not well delineated to the point that would suggest central necrosis.    More inferiorly in the superior mediastinal region there are lymph nodes anterior to the distal trachea.  And retropharyngeal space.    Infrahyoid neck: Normal larynx, hypopharynx, and supraglottis.    Thyroid: Normal.    Thoracic inlet: Normal lung apices and brachial plexus.    Lymph nodes Normal. No lymphadenopathy.    Vascular structures: Normal.    Lungs: Normal    Mediastinum: Normal    Other findings:                                Assessment/Plan:  P     40 y.o. female with  has a past medical history of Asthma, GERD with esophagitis, Hypertension, and Left ovarian cyst. Hx of possible trigeminal neuralgia 9/20 resolved.  Now with difficulty swallowing solids x 1 week, in setting of diagnosed vocal cord paralysis without known etiology, as well as proximal muscle weakness on initial exam and repeat was unremarkable. However, given the dysphagia + possible CN palsy, would consider neurosarcoidosis/sarcoidosis in this patient.     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  sending off a a serum ACE level, VDRL, IL2, cytology studies, and to save CSF to freeze  -Note does not have weakness or other findings on exam tht would suggest MG or myopathy, but depending on clinical course, will consider NCS with RNS (  ie ? MG, other Neur muscular disorder).    Differential diagnosis was explained to the patient. All questions were answered. Patient understood and agreed to adhere to plan.     Will follow for additional input regarding management of current neurologic condition.  Will monitor for any new neurologic signs/symptoms or any neurologic detrimental changes.       Case discussed with and pt examined by Dr. Combs    Thank you for your consult.    Electronically signed by: Layla Padilla MD 9/22/2020 12:25 PM

## 2020-09-22 NOTE — PROGRESS NOTES
"Otolaryngology Progress Note    Elva Caceres  Encounter Date: 09/22/2020  YOB: 1980  Provider: Danielle Rodríguez MD    Chief complaint: dysphagia and hoarsness    Subjective: Elva Caceres is a 40 y.o. female dmitted with dysphagia. Found to have right vocal cord paresis on scope exam. EGD normal    Overnight: No acute events overnight. Patient voice worse this morning. Complaining of feeling strained when talking. No stridor or difficulty breathing. Denies throat or neck pain. Still reports that left "fullness" in her neck.      Past medical/surgical/family/social history and review of systems reviewed - no interval changes.    Medications  Scheduled Meds:   carBAMazepine  100 mg Oral BID    enoxparin  40 mg Subcutaneous Q24H    fluticasone furoate-vilanteroL  1 puff Inhalation Daily    fluticasone propionate  2 spray Each Nostril Daily    pantoprazole  40 mg Oral BID    potassium chloride  40 mEq Oral Q4H    senna-docusate 8.6-50 mg  1 tablet Oral BID    sucralfate  1 g Oral Q6H    triamterene-hydrochlorothiazide 37.5-25 mg  2 capsule Oral QAM     Continuous Infusions:  PRN Meds:.acetaminophen, acetaminophen, dextrose 50%, dextrose 50%, glucagon (human recombinant), glucose, glucose, HYDROcodone-acetaminophen, insulin aspart U-100, ondansetron, sodium chloride 0.9%      Objective:  Vitals:    09/22/20 0506 09/22/20 0837 09/22/20 0845 09/22/20 1045   BP: (!) 143/83  (!) 167/105    BP Location:       Patient Position: Lying      Pulse: 82 101 (!) 116    Resp: 20 20 17    Temp: 98.2 °F (36.8 °C)  98.4 °F (36.9 °C)    TempSrc: Oral  Oral    SpO2: 98% 98% 97%    Weight: 118.1 kg (260 lb 5.8 oz)   117.9 kg (260 lb)   Height:    5' 9" (1.753 m)       General: NAD, AOx3, moderate dysphonia  Eye: EOMI bilaterally, PERRLA  Head: normocephalic, atraumatic  Ears: AU: normal external ear. Grossly normal hearing.   Nose: external nose normal  Oral cavity/oropharynx: mucosal membranes moist, " tonsils within normal limits, tongue within normal limits, no masses or lesions noted, no significant oropharyngeal edema or erythema  Neck: supple, NT, no cervical LAD or mass, full ROM  Respiratory: nonlabored breathing, no stridor      Labs & Imaging         Intake/Output Summary (Last 24 hours) at 9/22/2020 1315  Last data filed at 9/22/2020 0700  Gross per 24 hour   Intake 60 ml   Output --   Net 60 ml       Assessment: Elva Caceres is a 40 y.o. female admitted with dysphagia. Found to have right vocal cord paresis on scope exam. EGD normal. CT Neck has been reviewed. There is no evidence of any oropharyngeal, parapharyngeal, or laryngeal edema or abscess. There is no neck mass or etiology to explain cord paresis.      Plan:   - Recommend continued speech therapy as an outpatient along with outpatient follow up. No acute intervention for an idiopathic cord paralysis is warranted unless patient aspirating. We discussed future options of vocal cord injections as an outpatient if mobility does not return.  - See no benefit of re-scoping her today. Scope Sunday revealed hypomobile right cord. There was no edema or mass. Unless new airway issue or further deterioration in voice would plan for rescope as an outpatient.  - Discussed bedside swallow with speech therapy. No concerns on bedside so likely ok to hold of on MBBS since patient refused earlier. Discussed with patient eating small bites and taking small sips. Advised her to let us know if she develops signs of aspiration - ie coughing when eating or drinking.   - Discussed with primary team, being evaluated for possible myasthenia for additional proximal muscles weakness found by Neurology.      BRAXTON Echeverria MD  [facility]

## 2020-09-22 NOTE — PLAN OF CARE
"ENT and ST recommends outpt [facility] for ambulatory referral order. Awaiting Neuro notes.    Future Appointments   Date Time Provider Department Center   10/6/2020 10:00 AM William Savage MD [facility]       BP (!) 143/102   Pulse 88   Temp 98 °F (36.7 °C) (Oral)   Resp 17   Ht 5' 9" (1.753 m)   Wt 117.9 kg (260 lb)   SpO2 98%   Breastfeeding No   BMI 38.40 kg/m²     Recent Labs   Lab 09/22/20  0545   *   K 3.4*   CL 98   CO2 22*   BUN 7   CREATININE 0.8   MG 1.8          09/22/20 1451   Post-Acute Status   Post-Acute Authorization Other   Other Status Community Services     Reina Mckeon RN  RN Transition Navigator  [phone]    "

## 2020-09-22 NOTE — TELEPHONE ENCOUNTER
Returned call. Spoke with Dr. Maria. She stated that the pt had an EGD done yesterday and they did see the vocal cord paralysis. Would like to know if there is anything else Dr. Rodríguez recommends. Informed that Dr. Rodríguez is in clinic but would send a message and call her back with what the doctor recommends. Dr. Maria verbalized understanding.     ----- Message from [employer] sent at 9/22/2020  9:48 AM CDT -----  Contact: [phone] Dr Hunter payne/[facility]  Dr Hunter payne/[facility] calling to speak with Dr Rodríguez regarding the patient

## 2020-09-22 NOTE — PROGRESS NOTES
[facility] Hospital Medicine  Progress Note    Patient Name: Elva Caceres  MRN: [id number]  Patient Class: IP- Inpatient   Admission Date: 9/19/2020  Length of Stay: 1 days  Attending Physician: Arnel Sarmiento MD  Primary Care Provider: Rob Sorto DO        Subjective:     Principal Problem:Esophageal dysmotility        HPI:  Pt is a a 40 y.o. female with PMHx of tonsillectomy and HTN who presents to the ED with complaint of throat pain and dysphagia for 3 days. Dysphagia is associated with solids not liquids. She reports one episode of emesis after drinking a milkshake yesterday since then onset of wheezing. Symptoms are persistent but not worsening. Pt reports feelings of food getting stuck in her throat. She has had difficulty taking her daily medications. Associated symptoms include shortness of breath and feeling of muffled voice. She reports that this has never happened before and no sick contacts.  No associated rhinorrhea. Pt reported attempted to take her inhaler to alleviate symptoms but no improvement. No drooling and no issues with secretions/liquids.     In ED, CT soft tissue of neck showed possible enlargement and early inflammation of the right tonsillar pillar w/ prominent lymph nodes anterior to the distal trachea in superior mediastinum. concern for aspiration so CXR was performed. No sign of aspiration. Labs showed hyponatremia. COVID neg. ENT and Gi were consulted. . GI reported plan to scope patient and to place her on a liquid diet with PPI and liquid carafate for now.            Overview/Hospital Course:  9/20 ENT laryngoscope showed right vocal cord impaired  9/21 scheduled for EGD today: EGD showed Normal esophagus. Biopsied to rule out EoE. Erythematous mucosa in the gastric body. Biopsied.  Normal duodenum.     Interval History: NAEON. Pt reports persistent secretions and fullness in neck. Did not attempt to try clear liquids    Review of Systems    Constitutional: Negative for chills and fever.   HENT: Positive for sore throat (throat pain). Negative for congestion, postnasal drip and rhinorrhea.    Eyes: Negative for pain and redness.   Respiratory: Negative for cough, shortness of breath and stridor.    Gastrointestinal: Negative for abdominal pain and nausea.   Genitourinary: Negative for dysuria and urgency.   Musculoskeletal: Positive for neck pain (left). Negative for neck stiffness.   Skin: Negative for color change and rash.   Neurological: Negative for speech difficulty and weakness.   Psychiatric/Behavioral: Negative for confusion. The patient is not nervous/anxious.      Objective:     Vital Signs (Most Recent):  Temp: 98.2 °F (36.8 °C) (09/22/20 0506)  Pulse: 82 (09/22/20 0506)  Resp: 20 (09/22/20 0506)  BP: (!) 143/83 (09/22/20 0506)  SpO2: 98 % (09/22/20 0506) Vital Signs (24h Range):  Temp:  [97.4 °F (36.3 °C)-98.7 °F (37.1 °C)] 98.2 °F (36.8 °C)  Pulse:  [] 82  Resp:  [14-22] 20  SpO2:  [95 %-100 %] 98 %  BP: (143-169)/() 143/83     Weight: 118.1 kg (260 lb 5.8 oz)  Body mass index is 38.45 kg/m².  No intake or output data in the 24 hours ending 09/22/20 0658   Physical Exam  Vitals signs and nursing note reviewed.   Constitutional:       General: She is not in acute distress.     Appearance: She is obese. She is not ill-appearing or diaphoretic.      Comments: Comfortably resting in bed. Voice with mild dysphonia - on RA   HENT:      Head: Normocephalic and atraumatic.      Right Ear: External ear normal.      Left Ear: External ear normal.      Nose: Nose normal.      Mouth/Throat:      Mouth: Mucous membranes are moist.      Pharynx: Oropharynx is clear. No oropharyngeal exudate or posterior oropharyngeal erythema.   Eyes:      Extraocular Movements: Extraocular movements intact.      Pupils: Pupils are equal, round, and reactive to light.   Cardiovascular:      Rate and Rhythm: Normal rate and regular rhythm.      Pulses:  Normal pulses.      Heart sounds: Normal heart sounds. No murmur.   Pulmonary:      Effort: Pulmonary effort is normal. No respiratory distress.      Breath sounds: No stridor.   Abdominal:      General: Abdomen is flat.      Palpations: Abdomen is soft.   Musculoskeletal:         General: No deformity.   Skin:     General: Skin is warm and dry.      Capillary Refill: Capillary refill takes less than 2 seconds.   Neurological:      General: No focal deficit present.      Mental Status: She is alert and oriented to person, place, and time.      Cranial Nerves: No cranial nerve deficit.   Psychiatric:         Mood and Affect: Mood normal.         Behavior: Behavior normal.         Judgment: Judgment normal.         Significant Labs:   A1C:   Recent Labs   Lab 09/11/20  1032 09/19/20  1736   HGBA1C 5.8* 5.6     CBC:   Recent Labs   Lab 09/21/20  0329 09/22/20  0545   WBC 4.21 4.74   HGB 13.3 14.8   HCT 38.6 42.6    306     CMP:   Recent Labs   Lab 09/21/20 0329 09/22/20  0545   * 135*   K 3.9 3.4*    98   CO2 22* 22*   GLU 81 88   BUN 5* 7   CREATININE 0.7 0.8   CALCIUM 9.2 9.8   PROT 7.5 8.3   ALBUMIN 3.6 3.9   BILITOT 0.5 0.6   ALKPHOS 81 87   AST 46* 57*   ALT 49* 58*   ANIONGAP 12 15   EGFRNONAA >60 >60     TSH:   Recent Labs   Lab 09/19/20  1118   TSH 0.870       Significant Imaging: I have reviewed all pertinent imaging results/findings within the past 24 hours.      Assessment/Plan:      * Esophageal dysmotility  3 days of esophageal dysphagia associated with solids  CT soft tissue of neck showed possible enlargement and early inflammation of the right tonsillar pillar w/ prominent lymph nodes anterior to the distal trachea in superior mediastinum.  CXR no sign of aspiration    Plan:  - GI consult, recs appreciated  - PPI BID  - carafate (liquid) QID  - clear liquid diet  - swallow study pending  - EGD done: biopsy taken. Normal esophagus. Erythematous mucosa in the gastric body. Normal  examined duodenum.             Other dysphagia        Vocal cord paresis  Persistent regurgitation of liquids  Mild dysphonia  ENT consulted, recs appreciated    Plan:  Laryngoscope 9/20  Right vocal cord impairment not consistent with solids getting stuck  Speech evaluation ordered  ENT suggested EGD, will f/u after EGD and speech evaluation 9/21  EGD showed no cause of dysphagia, will f.u with ENT for further recs      Gastroesophageal reflux disease with esophagitis          VTE Risk Mitigation (From admission, onward)         Ordered     enoxaparin injection 40 mg  Every 24 hours (non-standard times)      09/20/20 1001     IP VTE HIGH RISK PATIENT  Once      09/19/20 1658     Place sequential compression device  Until discontinued      09/19/20 1658                Discharge Planning   GINI:      Code Status: Full Code   Is the patient medically ready for discharge?:     Reason for patient still in hospital (select all that apply): Patient trending condition and Consult recommendations  Discharge Plan A: Home                  Bernabe Harrison MD  Department of Hospital Medicine   [facility]

## 2020-09-23 ENCOUNTER — TELEPHONE (OUTPATIENT)
Dept: OTOLARYNGOLOGY | Facility: CLINIC | Age: 40
End: 2020-09-23

## 2020-09-23 ENCOUNTER — TELEPHONE (OUTPATIENT)
Dept: NEUROLOGY | Facility: CLINIC | Age: 40
End: 2020-09-23

## 2020-09-23 VITALS
DIASTOLIC BLOOD PRESSURE: 94 MMHG | HEART RATE: 109 BPM | SYSTOLIC BLOOD PRESSURE: 131 MMHG | BODY MASS INDEX: 38.57 KG/M2 | RESPIRATION RATE: 20 BRPM | TEMPERATURE: 98 F | HEIGHT: 69 IN | OXYGEN SATURATION: 99 % | WEIGHT: 260.38 LBS

## 2020-09-23 DIAGNOSIS — Z01.818 PRE-PROCEDURAL EXAMINATION: ICD-10-CM

## 2020-09-23 LAB
ALBUMIN SERPL BCP-MCNC: 3.7 G/DL (ref 3.5–5.2)
ALP SERPL-CCNC: 87 U/L (ref 55–135)
ALT SERPL W/O P-5'-P-CCNC: 62 U/L (ref 10–44)
ANION GAP SERPL CALC-SCNC: 13 MMOL/L (ref 8–16)
AST SERPL-CCNC: 59 U/L (ref 10–40)
BASOPHILS # BLD AUTO: 0.06 K/UL (ref 0–0.2)
BASOPHILS NFR BLD: 1.3 % (ref 0–1.9)
BILIRUB SERPL-MCNC: 0.7 MG/DL (ref 0.1–1)
BUN SERPL-MCNC: 8 MG/DL (ref 6–20)
CALCIUM SERPL-MCNC: 9.2 MG/DL (ref 8.7–10.5)
CHLORIDE SERPL-SCNC: 98 MMOL/L (ref 95–110)
CO2 SERPL-SCNC: 22 MMOL/L (ref 23–29)
CREAT SERPL-MCNC: 0.7 MG/DL (ref 0.5–1.4)
DIFFERENTIAL METHOD: ABNORMAL
EOSINOPHIL # BLD AUTO: 0.2 K/UL (ref 0–0.5)
EOSINOPHIL NFR BLD: 3.4 % (ref 0–8)
ERYTHROCYTE [DISTWIDTH] IN BLOOD BY AUTOMATED COUNT: 14 % (ref 11.5–14.5)
EST. GFR  (AFRICAN AMERICAN): >60 ML/MIN/1.73 M^2
EST. GFR  (NON AFRICAN AMERICAN): >60 ML/MIN/1.73 M^2
GLUCOSE SERPL-MCNC: 84 MG/DL (ref 70–110)
HCT VFR BLD AUTO: 41.4 % (ref 37–48.5)
HGB BLD-MCNC: 14.4 G/DL (ref 12–16)
IMM GRANULOCYTES # BLD AUTO: 0 K/UL (ref 0–0.04)
IMM GRANULOCYTES NFR BLD AUTO: 0 % (ref 0–0.5)
LYMPHOCYTES # BLD AUTO: 1.1 K/UL (ref 1–4.8)
LYMPHOCYTES NFR BLD: 24.7 % (ref 18–48)
MAGNESIUM SERPL-MCNC: 1.9 MG/DL (ref 1.6–2.6)
MCH RBC QN AUTO: 28.9 PG (ref 27–31)
MCHC RBC AUTO-ENTMCNC: 34.8 G/DL (ref 32–36)
MCV RBC AUTO: 83 FL (ref 82–98)
MONOCYTES # BLD AUTO: 0.7 K/UL (ref 0.3–1)
MONOCYTES NFR BLD: 15.7 % (ref 4–15)
NEUTROPHILS # BLD AUTO: 2.4 K/UL (ref 1.8–7.7)
NEUTROPHILS NFR BLD: 54.9 % (ref 38–73)
NRBC BLD-RTO: 0 /100 WBC
PHOSPHATE SERPL-MCNC: 3.6 MG/DL (ref 2.7–4.5)
PLATELET # BLD AUTO: 291 K/UL (ref 150–350)
PMV BLD AUTO: 10.7 FL (ref 9.2–12.9)
POCT GLUCOSE: 76 MG/DL (ref 70–110)
POTASSIUM SERPL-SCNC: 3.6 MMOL/L (ref 3.5–5.1)
PROT SERPL-MCNC: 7.9 G/DL (ref 6–8.4)
RBC # BLD AUTO: 4.98 M/UL (ref 4–5.4)
SODIUM SERPL-SCNC: 133 MMOL/L (ref 136–145)
WBC # BLD AUTO: 4.45 K/UL (ref 3.9–12.7)

## 2020-09-23 PROCEDURE — 25000003 PHARM REV CODE 250: Performed by: STUDENT IN AN ORGANIZED HEALTH CARE EDUCATION/TRAINING PROGRAM

## 2020-09-23 PROCEDURE — 96375 TX/PRO/DX INJ NEW DRUG ADDON: CPT | Performed by: EMERGENCY MEDICINE

## 2020-09-23 PROCEDURE — 84100 ASSAY OF PHOSPHORUS: CPT

## 2020-09-23 PROCEDURE — 83735 ASSAY OF MAGNESIUM: CPT

## 2020-09-23 PROCEDURE — 25000242 PHARM REV CODE 250 ALT 637 W/ HCPCS: Performed by: STUDENT IN AN ORGANIZED HEALTH CARE EDUCATION/TRAINING PROGRAM

## 2020-09-23 PROCEDURE — 94640 AIRWAY INHALATION TREATMENT: CPT

## 2020-09-23 PROCEDURE — 80053 COMPREHEN METABOLIC PANEL: CPT

## 2020-09-23 PROCEDURE — 94761 N-INVAS EAR/PLS OXIMETRY MLT: CPT

## 2020-09-23 PROCEDURE — 36415 COLL VENOUS BLD VENIPUNCTURE: CPT

## 2020-09-23 PROCEDURE — G0378 HOSPITAL OBSERVATION PER HR: HCPCS

## 2020-09-23 PROCEDURE — 96374 THER/PROPH/DIAG INJ IV PUSH: CPT | Performed by: EMERGENCY MEDICINE

## 2020-09-23 PROCEDURE — 85025 COMPLETE CBC W/AUTO DIFF WBC: CPT

## 2020-09-23 RX ORDER — IPRATROPIUM BROMIDE AND ALBUTEROL SULFATE 2.5; .5 MG/3ML; MG/3ML
3 SOLUTION RESPIRATORY (INHALATION) ONCE
Status: COMPLETED | OUTPATIENT
Start: 2020-09-23 | End: 2020-09-23

## 2020-09-23 RX ORDER — LABETALOL HYDROCHLORIDE 5 MG/ML
10 INJECTION, SOLUTION INTRAVENOUS ONCE
Status: COMPLETED | OUTPATIENT
Start: 2020-09-23 | End: 2020-09-23

## 2020-09-23 RX ADMIN — SUCRALFATE 1 G: 1 SUSPENSION ORAL at 05:09

## 2020-09-23 RX ADMIN — HYDROCODONE BITARTRATE AND ACETAMINOPHEN 1 TABLET: 5; 325 TABLET ORAL at 12:09

## 2020-09-23 RX ADMIN — HYDROCODONE BITARTRATE AND ACETAMINOPHEN 1 TABLET: 5; 325 TABLET ORAL at 02:09

## 2020-09-23 RX ADMIN — LABETALOL HYDROCHLORIDE 10 MG: 5 INJECTION, SOLUTION INTRAVENOUS at 11:09

## 2020-09-23 RX ADMIN — FLUTICASONE FUROATE AND VILANTEROL TRIFENATATE 1 PUFF: 100; 25 POWDER RESPIRATORY (INHALATION) at 08:09

## 2020-09-23 RX ADMIN — IPRATROPIUM BROMIDE AND ALBUTEROL SULFATE 3 ML: .5; 3 SOLUTION RESPIRATORY (INHALATION) at 10:09

## 2020-09-23 RX ADMIN — TRIAMTERENE AND HYDROCHLOROTHIAZIDE 2 CAPSULE: 25; 37.5 CAPSULE ORAL at 06:09

## 2020-09-23 NOTE — TELEPHONE ENCOUNTER
----- Message from Danielle Frazier MD sent at 9/23/2020  2:31 PM CDT -----  Regarding: RE: Pt will need F/U with ENT  Contact: RENETTA Huang Case [place]  That's fine - she will need a covid test for scope  ----- Message -----  From: Darling Adam MA  Sent: 9/23/2020   1:31 PM CDT  To: Danielle Frazier MD  Subject: FW: Pt will need F/U with ENT                    I saw that there were some conversations with you, shaheen and Dr. Maria. Should I proceed with scheduling in 1 to 2 weeks or do you advise otherwise?     -Darling  ----- Message -----  From: Reina Mckeon RN  Sent: 9/23/2020  10:52 AM CDT  To: [facility] Staff  Subject: Pt will need F/U with ENT                        Pt will need a hospital follow up appointment in 1 - 2 wks.  Please schedule so it would be on the discharge paperwork.

## 2020-09-23 NOTE — TELEPHONE ENCOUNTER
I both saw the patient and spoke to the primary team yesterday. I found the vocal cord paralysis on her scope exam that was not an EGD finding. I have discussed this with both the patient and the team.

## 2020-09-23 NOTE — CARE UPDATE
Patient very frustrated with being in the hospital. She does not want to stay for any further treatment or imaging. She states she does not know why no one has been able to figure out what is going. Patient is declining MRI or further workup. She is requesting a breathing treatment for her asthma.    Explained patient's hospital course and current findings and why further work up is necessary. Patient is refusing to discuss her health any further.     Patient states she wants to be discharged or she will leave against medical advice. Discussed the importance of treatment and that she can not be safely discharged at this time.     Maurisio Crawford MD  [facility]

## 2020-09-23 NOTE — PLAN OF CARE
Recommendation:   1. Add Boost Plus bid.   2. Encourage intake at meals as tolerated.   3. RD to follow to monitor po intake    Goals:   Pt will tolerate po diet with at least 50-75% intake at meals  Nutrition Goal Status: new

## 2020-09-23 NOTE — PROGRESS NOTES
"[facility]  Adult Nutrition  Progress Note    SUMMARY       Recommendations    Recommendation:   1. Add Boost Plus bid.   2. Encourage intake at meals as tolerated.   3. RD to follow to monitor po intake    Goals:   Pt will tolerate po diet with at least 50-75% intake at meals  Nutrition Goal Status: new    Reason for Assessment  Reason For Assessment: identified at risk by screening criteria(dysphagia)  Diagnosis: (esophageal dysmotility)  Relevant Medical History: asthma, HTN, GERD, L ovarian cyst  General Information Comments: Pt just advanced to [facility] Soft diet. Tolerating a few bites. Noted hoarse voice. Denies recent weight loss but reports difficulty swallowing past few days. s.p EGD 9/21. NFPE not warranted-pt nourished  Nutrition Discharge Planning: d/c diet per SLP    Nutrition Risk Screen  Nutrition Risk Screen: dysphagia or difficulty swallowing    Nutrition/Diet History  Food Preferences: no Druze or cultural food prefs identified  Spiritual, Cultural Beliefs, Taoism Practices, Values that Affect Care: no  Factors Affecting Nutritional Intake: difficulty/impaired swallowing    Anthropometrics  Temp: 98 °F (36.7 °C)  Height Method: Stated  Height: 5' 9" (175.3 cm)  Height (inches): 69 in  Weight Method: Bed Scale  Weight: 118.1 kg (260 lb 5.8 oz)  Weight (lb): 260.37 lb  Ideal Body Weight (IBW), Female: 145 lb  % Ideal Body Weight, Female (lb): 179.57 %  BMI (Calculated): 38.4  BMI Grade: 35 - 39.9 - obesity - grade II     Lab/Procedures/Meds  Pertinent Labs Reviewed: reviewed  Pertinent Labs Comments: Na 135L, K 3.4L  Pertinent Medications Reviewed: reviewed  Pertinent Medications Comments: pantoprazole    Estimated/Assessed Needs  Weight Used For Calorie Calculations: 65.9 kg (145 lb 4.5 oz)  Energy Calorie Requirements (kcal): 1977 (30 kcal/kg)  Energy Need Method: Kcal/kg  Protein Requirements: 65g (1.0g/kg)  Weight Used For Protein Calculations: 65.9 kg (145 lb 4.5 " oz)  Estimated Fluid Requirement Method: RDA Method  RDA Method (mL): 1977    Nutrition Prescription Ordered  Current Diet Order: [facility] Soft    Evaluation of Received Nutrient/Fluid Intake  I/O: 60/0  Energy Calories Required: not meeting needs  Protein Required: not meeting needs  Fluid Required: not meeting needs  Comments: LBM 9/19  % Intake of Estimated Energy Needs: 0 - 25 %  % Meal Intake: 0 - 25 %    Nutrition Risk  Level of Risk/Frequency of Follow-up: (2xweekly)   Assessment and Plan  * Esophageal dysmotility  Contributing Nutrition Diagnosis  Difficulty swallowing    Related to (etiology):   dx    Signs and Symptoms (as evidenced by):   Restricted diet, decreased intake     Interventions:  Collaboration with other providers  Commercial beverage    Nutrition Diagnosis Status:   New      Monitor and Evaluation  Food and Nutrient Intake: food and beverage intake  Food and Nutrient Adminstration: diet order  Physical Activity and Function: nutrition-related ADLs and IADLs  Anthropometric Measurements: weight  Biochemical Data, Medical Tests and Procedures: electrolyte and renal panel  Nutrition-Focused Physical Findings: overall appearance     Malnutrition Assessment  Subcutaneous Fat Loss (Final Summary): well nourished  Muscle Loss Evaluation (Final Summary): well nourished       Nutrition Follow-Up  RD Follow-up?: Yes

## 2020-09-23 NOTE — PLAN OF CARE
Plan of care reviewed with patient. Swallowed medicine without issues. Blood glucose maintained per MD orders. Safety maintained at this time. Bed in lowest position, side rails up x 2. Call light in reach.  Encouraged patient to use call light for assistance .Verbalized understanding. Will continue to monitor patient.

## 2020-09-23 NOTE — PROGRESS NOTES
[facility] PROGRESS NOTE      Subjective:     HPI: Elva Caceres is a 40 y.o. female with  has a past medical history of Asthma, GERD with esophagitis, Hypertension, and Left ovarian cyst. Hx of possible trigeminal neuralgia vs herpetic neuralgia (given tegretol and acyclovir) back on 9/2/20, recovered. Now with dysphagia to solids x 1 week, diagnosed with vocal cord paralysis.     Interim Period: Prior to neuro eval, patient already left.      Current Evaluation:     Vital Signs:   Vitals:    09/23/20 1212   BP: (!) 131/94   Pulse: 109   Resp:    Temp:         General Exam: unable to assess, patient left       LABORATORY STUDIES:  Labs:  Recent Labs   Lab 09/21/20  0329 09/22/20  0545 09/23/20  0546   WBC 4.21 4.74 4.45   HGB 13.3 14.8 14.4   HCT 38.6 42.6 41.4    306 291   MCV 83 83 83       Recent Labs   Lab 09/21/20  0329 09/22/20  0545 09/23/20  0546   * 135* 133*   K 3.9 3.4* 3.6    98 98   CO2 22* 22* 22*   BUN 5* 7 8   GLU 81 88 84   CALCIUM 9.2 9.8 9.2   PROT 7.5 8.3 7.9   ALBUMIN 3.6 3.9 3.7   BILITOT 0.5 0.6 0.7   AST 46* 57* 59*   ALKPHOS 81 87 87   ALT 49* 58* 62*       Recent Labs   Lab 09/19/20  1118 09/20/20  0416 09/21/20  0329 09/22/20  0545 09/23/20  0546   GLU 99 99 81 88 84       Recent Labs   Lab 09/19/20  1736   HGBA1C 5.6       Thyroid:   Recent Labs   Lab 09/19/20  1118   TSH 0.870     RADIOLOGY STUDIES:   Imaging Results          X-Ray Chest AP Portable (Final result)  Result time 09/19/20 15:29:00    Final result by Russ Moon MD (09/19/20 15:29:00)                 Impression:      1. No acute cardiopulmonary process, shallow inspiratory effort.      Electronically signed by: Russ Moon MD  Date:    09/19/2020  Time:    15:29             Narrative:    EXAMINATION:  XR CHEST AP PORTABLE    CLINICAL HISTORY:  Concern for aspiration;    TECHNIQUE:  Single frontal view of the chest was performed.    COMPARISON:  08/07/2020    FINDINGS:  The  cardiomediastinal silhouette is not enlarged.  There is no pleural effusion.  The trachea is midline.  The lungs are symmetrically expanded bilaterally with mildly coarse interstitial attenuation, accentuated by shallow inspiratory effort..  No large focal consolidation seen.  There is no pneumothorax.  The osseous structures are remarkable for degenerative changes of the spine..  There is a questionable calcified granuloma or calcified lymph node along the medial aspect of the left upper lobe versus artifact.                               CT Soft Tissue Neck With Contrast (Final result)  Result time 09/19/20 12:43:30    Final result by Sean Love MD (09/19/20 12:43:30)                 Impression:      Possible enlargement and early inflammation of the right tonsillar pillar.    Prominent lymph nodes seen anterior to the distal trachea in the superior mediastinum.      Electronically signed by: Sean Love  Date:    09/19/2020  Time:    12:43             Narrative:    EXAMINATION:  CT SOFT TISSUE NECK WITH CONTRAST    CLINICAL HISTORY:  Neck mass, solitary, afebrile (Age => 16y);anterior neck pain, dyspnea, dysphagia;    TECHNIQUE:  Low dose axial images, coronal and sagittal reformations were obtained from the skull base to the lung bases following the intravenous administration of 75 mL of Omnipaque 350.    COMPARISON:  None.    FINDINGS:  Orbits, paranasal sinuses, and skull base: Normal.    Nasopharynx: Normal.    Suprahyoid neck: Normal oropharynx, oral cavity, there appears to be a vague area of soft tissue fullness with a central region of hypo attenuation seen to the right of the midline on image number 36 of series 2.  This possibly represents inflammation of the tonsillar pillar however there does not appear to be evidence of enhancement peripherally that is well delineated.  Also as suggested the central aspect is not well delineated to the point that would suggest central necrosis.    More  inferiorly in the superior mediastinal region there are lymph nodes anterior to the distal trachea.  And retropharyngeal space.    Infrahyoid neck: Normal larynx, hypopharynx, and supraglottis.    Thyroid: Normal.    Thoracic inlet: Normal lung apices and brachial plexus.    Lymph nodes Normal. No lymphadenopathy.    Vascular structures: Normal.    Lungs: Normal    Mediastinum: Normal    Other findings:                                Assessment/Plan:  P     40 y.o. female with  has a past medical history of Asthma, GERD with esophagitis, Hypertension, and Left ovarian cyst. Hx of possible trigeminal neuralgia 9/20 resolved. Now with difficulty swallowing solids x 1 week, in setting of diagnosed vocal cord paralysis without known etiology, as well as proximal muscle weakness on initial exam and repeat was unremarkable. However, given the dysphagia + possible CN palsy, would consider neurosarcoidosis/sarcoidosis in this patient.     Recommendations for outpatient as patient left:  - f/u neurology outpatient   - consider MRI neck soft tissue  - Possible outpatient workup for a neurosarcoidosis given a possible facial nerve palsy (recovered) + recurrent laryngeal palsy causing this hoarseness and vocal cord paralysis    Case discussed w Dr Perry    Thank you for your consult.    Electronically signed by: Layla Padilla MD 9/23/2020 [facility]

## 2020-09-23 NOTE — TELEPHONE ENCOUNTER
----- Message from Reina Mckeon RN sent at 9/23/2020 12:34 PM CDT -----  Contact: RENETTA Huang Case Manager  Pt will need a hospital follow up appointment in 1 - 2 wks. Pt has referral in [facility]. She has already left the hospital. Please contact her to set up a F/U appt.

## 2020-09-23 NOTE — ASSESSMENT & PLAN NOTE
Persistent regurgitation of liquids  Mild dysphonia  ENT consulted, recs appreciated    Plan:  Laryngoscope 9/20  Right vocal cord impairment not consistent with solids getting stuck  Speech evaluation ordered  ENT suggested EGD, will f/u after EGD and speech evaluation   Echo showed normal EF 60% no cause of nerve compression  ENT suggested fu outpatient, educated patient on modifying diet intake with sips of liquid in between small bites  Recommended for speech therapy outpatient

## 2020-09-23 NOTE — ASSESSMENT & PLAN NOTE
Contributing Nutrition Diagnosis  Difficulty swallowing    Related to (etiology):   dx    Signs and Symptoms (as evidenced by):   Restricted diet, decreased intake     Interventions:  Collaboration with other providers  Commercial beverage    Nutrition Diagnosis Status:   New

## 2020-09-23 NOTE — PROGRESS NOTES
[facility] Hospital Medicine  Progress Note    Patient Name: Elva Caceres  MRN: [id number]  Patient Class: OP- Observation   Admission Date: 9/19/2020  Length of Stay: 2 days  Attending Physician: Arnel Sarmiento MD  Primary Care Provider: Rob Sorto DO        Subjective:     Principal Problem:Vocal cord paresis        HPI:  Pt is a a 40 y.o. female with PMHx of tonsillectomy and HTN who presents to the ED with complaint of throat pain and dysphagia for 3 days. Dysphagia is associated with solids not liquids. She reports one episode of emesis after drinking a milkshake yesterday since then onset of wheezing. Symptoms are persistent but not worsening. Pt reports feelings of food getting stuck in her throat. She has had difficulty taking her daily medications. Associated symptoms include shortness of breath and feeling of muffled voice. She reports that this has never happened before and no sick contacts.  No associated rhinorrhea. Pt reported attempted to take her inhaler to alleviate symptoms but no improvement. No drooling and no issues with secretions/liquids.     In ED, CT soft tissue of neck showed possible enlargement and early inflammation of the right tonsillar pillar w/ prominent lymph nodes anterior to the distal trachea in superior mediastinum. concern for aspiration so CXR was performed. No sign of aspiration. Labs showed hyponatremia. COVID neg. ENT and Gi were consulted. . GI reported plan to scope patient and to place her on a liquid diet with PPI and liquid carafate for now.            Overview/Hospital Course:  9/20 ENT laryngoscope showed right vocal cord impaired  9/21 scheduled for EGD today: EGD showed Normal esophagus. Biopsied to rule out EoE. Erythematous mucosa in the gastric body. Biopsied.  Normal duodenum.   9/23 Pt c/o sob, Sa02 100% on RA, and requested nebulizing treatment for asthma. Ordered duo neb treatment x1 for patient. Patient very frustrated  with being in the hospital. She does not want to stay for any further treatment or imaging. Spoke with pt extensively on her understanding of her diagnosis and imaging/workup in progress. Patient is declining MRI or further workup.           No new subjective & objective note has been filed under this hospital service since the last note was generated.      Assessment/Plan:      * Vocal cord paresis  Persistent regurgitation of liquids  Mild dysphonia  ENT consulted, recs appreciated    Plan:  Laryngoscope 9/20  Right vocal cord impairment not consistent with solids getting stuck  Speech evaluation ordered  ENT suggested EGD, will f/u after EGD and speech evaluation   Echo showed normal EF 60% no cause of nerve compression  ENT suggested fu outpatient, educated patient on modifying diet intake with sips of liquid in between small bites  Recommended for speech therapy outpatient       Other dysphagia  Pt presented with proximal weakness on examination by [facility] neurology  Ordered MRI of neck but patient refused  Agreed to have patient follow up outpatient       Esophageal dysmotility  3 days of esophageal dysphagia associated with solids  CT soft tissue of neck showed possible enlargement and early inflammation of the right tonsillar pillar w/ prominent lymph nodes anterior to the distal trachea in superior mediastinum.  CXR no sign of aspiration    Plan:  - GI consult, recs appreciated  - PPI BID  - carafate (liquid) QID  - clear liquid diet  - swallow study pending  - EGD done: biopsy taken. Normal esophagus. Erythematous mucosa in the gastric body. Normal examined duodenum.             Gastroesophageal reflux disease with esophagitis          VTE Risk Mitigation (From admission, onward)         Ordered     enoxaparin injection 40 mg  Every 24 hours (non-standard times)      09/20/20 1001     IP VTE HIGH RISK PATIENT  Once      09/19/20 1658     Place sequential compression device  Until discontinued      09/19/20  1658                Discharge Planning   GINI: 9/23/2020     Code Status: Full Code   Is the patient medically ready for discharge?:     Reason for patient still in hospital (select all that apply): Imaging and Consult recommendations  Discharge Plan A: Home                  Bernabe Harrison MD  Department of Hospital Medicine   [facility]

## 2020-09-23 NOTE — NURSING
Pt resting quietly in bed. No complaints of pain at this time. Skin warm and dry to touch. NAD noted. Informed to call for assistance if needed.

## 2020-09-23 NOTE — PLAN OF CARE
Pt on RA with documented sats.The proper method of use, as well as anticipated side effects, of this metered-dose inhaler are discussed and demonstrated to the patient. Will continue to monitor.

## 2020-09-23 NOTE — TELEPHONE ENCOUNTER
Called patient to get appointment scheduled for a follow up from her ED visit. Patient requested morning appointment. Patient is scheduled for Monday 9/28 for ENT and 9/25 for covid swab. Patient thanked me for calling.

## 2020-09-23 NOTE — PLAN OF CARE
POC reviewed with pt, pt verbalized understanding. Safety maintained throughout shift, bed locked and in lowest position, call light in reach, Side rails up X 2. Non skid sock on when OOB. Pt remained free of fall/trauma. Pt self reports of pain treated with PO pain medication as ordered. VSS, pt remained afebrile this shift. Pt refusing all medication and MRI.  Discharge instruction, follow up appointments and medication instructions given to pt, pt verbalized understanding. IV discontinued, site asymptomatic, pressure dressing applied. IV labetalol given to treat elevated BP prior to discharge  All item gathered and taken at the time of discharge.

## 2020-09-23 NOTE — ASSESSMENT & PLAN NOTE
Pt presented with proximal weakness on examination by [facility] neurology  Ordered MRI of neck but patient refused  Agreed to have patient follow up outpatient

## 2020-09-23 NOTE — TELEPHONE ENCOUNTER
I called to get discuss scheduling a hospital follow up with neuro, I left a message for a returned call.

## 2020-09-23 NOTE — PLAN OF CARE
Pt reported shortness of breath. No sign of increased WOB or in acute distress. Ordered duo neb treatment x1 for patient. Pt expresses frustration on her neck fullness and no relief. Dr Crawford and I spoke with her extensively, speaking with her on her understanding of her diagnosis and imaging/workup in progress. We spoke with her in regards to consult findings and recommendations. Pt spoke with understanding. Pt refusing to get MRI, stating she wants an albuterol treatment and to be discharged. Gave guidance on importance of continuing treatment in hospital. Discussed at length serious complications of her condition and recommending  Continued treatment  Before safe discharge at this time.     Bernabe Harrison MD  Family Medicine HO-1  9/23/2020 9:14 AM   no

## 2020-09-24 NOTE — DISCHARGE SUMMARY
[facility] Hospital Medicine  Discharge Summary      Patient Name: Elva Caceres  MRN: [id number]  Admission Date: 9/19/2020  Hospital Length of Stay: 2 days  Discharge Date and Time:  9/23/2020  1:00 PM   Attending Physician: No att. providers found   Discharging Provider: Bernabe Harrison MD  Primary Care Provider: Rob Sorto DO      HPI:   Pt is a a 40 y.o. female with PMHx of tonsillectomy and HTN who presents to the ED with complaint of throat pain and dysphagia for 3 days. Dysphagia is associated with solids not liquids. She reports one episode of emesis after drinking a milkshake yesterday since then onset of wheezing. Symptoms are persistent but not worsening. Pt reports feelings of food getting stuck in her throat. She has had difficulty taking her daily medications. Associated symptoms include shortness of breath and feeling of muffled voice. She reports that this has never happened before and no sick contacts.  No associated rhinorrhea. Pt reported attempted to take her inhaler to alleviate symptoms but no improvement. No drooling and no issues with secretions/liquids.     In ED, CT soft tissue of neck showed possible enlargement and early inflammation of the right tonsillar pillar w/ prominent lymph nodes anterior to the distal trachea in superior mediastinum. concern for aspiration so CXR was performed. No sign of aspiration. Labs showed hyponatremia. COVID neg. ENT and Gi were consulted. . GI reported plan to scope patient and to place her on a liquid diet with PPI and liquid carafate for now.            Procedure(s) (LRB):  EGD (ESOPHAGOGASTRODUODENOSCOPY) (N/A)      Hospital Course:   Pt presented with dysphagia with solids. ENT and GI were consulted. Pt was diagnosed with right vocal cord paralysis after undergoing laryngoscope. EGD was performed with gastric mucosal biopsy taken for H pylori and EoE. Speech therapy counseled patient on adjusting her po intake with  smaller bites with  Sips of liquid in between. Neurology was consulted for possible dysphagia + possible CN palsy, considering neurosarcoidosis/sarcoidosis in this patient . MRI of the neck was ordered but patient refused. Pt scheduled with outpatient workup with neurology and imaging with follow up with ENT and speech therapy.          Consults:   Consults (From admission, onward)        Status Ordering Provider     Inpatient consult to Gastroenterology  Once     Provider:  Lanre Langley MD    Completed RAIMUNDO MCNEIL JR     Inpatient consult to Neurology  Once     Provider:  (Not yet assigned)    Completed RAHUL MILLARD          No new Assessment & Plan notes have been filed under this hospital service since the last note was generated.  Service: Hospital Medicine    Final Active Diagnoses:    Diagnosis Date Noted POA    PRINCIPAL PROBLEM:  Vocal cord paresis [J38.00] 09/20/2020 Yes    Other dysphagia [R13.19] 09/20/2020 Yes    Esophageal dysmotility [K22.4] 09/19/2020 Yes      Problems Resolved During this Admission:       Discharged Condition: stable    Disposition: Home or Self Care    Follow Up:  Follow-up Information     Schedule an appointment as soon as possible for a visit with Rob Sorto DO.    Specialty: Family Medicine  Why: FOLLOW UP WITH PCP, OFFICE TO CALL PATIENT/FAMILY TO SET UP APPT TIME, HIGH PRIORITY INBASKET MSG SENT TO OFFICE STAFF  Contact information:  [address]  [facility] [address]  [phone]             Schedule an appointment as soon as possible for a visit with Danielle Rodríguez MD.    Specialty: Otolaryngology  Why: FOLLOW UP WITH ENT MD, HIGH PRIORITY INBASKET MSG SENT TO OFFICE STAFF, OFFICE TO CALL PATIENT/FAMILY TO SET UP APPT TIME  Contact information:  [address]  [address]  [facility] [address] [phone]             Schedule an appointment as soon as possible for a visit with [facility].    Specialty: Neurology  Why:  FOLLOW UP WITH NEUROLOGY AFTER DISCHARGE, OFFICE TO CALL PATIENT/FAMILY TO SET UP APPT TIME, [facility] TO OFFICE STAFF  Contact information:  [address]  [place] [address] [phone]  Additional information:  2nd Floor [facility], [address]   At this time [facility] will only use these entries Main Hospital, [facility] side, and Emergency Department due to COVID-19 precautions.                Patient Instructions:      Ambulatory referral/consult to Neurology   Standing Status: Future   Referral Priority: Routine Referral Type: Consultation   Referral Reason: Specialty Services Required   Referred to Provider: LEAH MARQUEZ Requested Specialty: Neurology   Number of Visits Requested: 1     Ambulatory referral/consult to Speech Therapy   Standing Status: Future   Referral Priority: Routine Referral Type: Speech Therapy   Referral Reason: Specialty Services Required   Requested Specialty: Speech Pathology   Number of Visits Requested: 1     Diet Adult Regular     Diet Dysphagia Mechanical Soft     Notify your health care provider if you experience any of the following:  temperature >100.4     Notify your health care provider if you experience any of the following:  persistent nausea and vomiting or diarrhea     Notify your health care provider if you experience any of the following:  difficulty breathing or increased cough     Notify your health care provider if you experience any of the following:  persistent dizziness, light-headedness, or visual disturbances     Activity as tolerated       Significant Diagnostic Studies: Labs:   CMP   Recent Labs   Lab 09/23/20  0546   *   K 3.6   CL 98   CO2 22*   GLU 84   BUN 8   CREATININE 0.7   CALCIUM 9.2   PROT 7.9   ALBUMIN 3.7   BILITOT 0.7   ALKPHOS 87   AST 59*   ALT 62*   ANIONGAP 13   ESTGFRAFRICA >60   EGFRNONAA >60    and CBC   Recent Labs   Lab 09/23/20  0546   WBC 4.45   HGB 14.4   HCT 41.4         Endoscopy: Gastroscopy: biopsy taken no ulcers  Laryngoscopy: right vocal cord paralysis    Pending Diagnostic Studies:     Procedure Component Value Units Date/Time    Specimen to Pathology, Surgery Gastrointestinal tract [[id number]] Collected: 09/21/20 1546    Order Status: Sent Lab Status: In process Updated: 09/22/20 0749         Medications:  Reconciled Home Medications:      Medication List      CONTINUE taking these medications    acetaminophen 325 MG tablet  Commonly known as: TYLENOL  Take 1 tablet (325 mg total) by mouth every 6 (six) hours as needed for Pain.     BREO ELLIPTA 100-25 mcg/dose diskus inhaler  Generic drug: fluticasone furoate-vilanteroL  TAKE 1 PUFF BY MOUTH EVERY DAY     carBAMazepine 100 MG 12 hr tablet  Commonly known as: TEGRETOL XR  Take 1 tablet (100 mg total) by mouth 2 (two) times daily.     diclofenac sodium 1 % Gel  Commonly known as: VOLTAREN  Apply 2 g topically 4 (four) times daily.     meloxicam 7.5 MG tablet  Commonly known as: MOBIC  Take 1 tablet (7.5 mg total) by mouth daily as needed for Pain. Can take 1-2 tabs per day as needed for pain. Do not take more than 2 tabs per day.     omeprazole 20 MG capsule  Commonly known as: PRILOSEC  Take 1 capsule (20 mg total) by mouth 2 (two) times a day.     SYSTANE BALANCE 0.6 % Drop  Generic drug: propylene glycol  Apply 1 drop to eye 4 (four) times daily as needed.     triamterene-hydrochlorothiazide 37.5-25 mg 37.5-25 mg per capsule  Commonly known as: DYAZIDE  TAKE ONE CAPSULE BY MOUTH IN THE MORNING     valACYclovir 1000 MG tablet  Commonly known as: VALTREX  Take 1 tablet (1,000 mg total) by mouth 3 (three) times daily. for 7 days        STOP taking these medications    ondansetron 8 MG tablet  Commonly known as: ZOFRAN            Indwelling Lines/Drains at time of discharge:   Lines/Drains/Airways     None                 Time spent on the discharge of patient: 33 minutes  Patient was seen and examined on the date of  discharge and determined to be suitable for discharge.         Bernabe Harrison MD  Department of Hospital Medicine  [facility]

## 2020-09-25 LAB
FINAL PATHOLOGIC DIAGNOSIS: NORMAL
GROSS: NORMAL

## 2020-10-03 ENCOUNTER — TELEPHONE (OUTPATIENT)
Dept: EMERGENCY MEDICINE | Facility: HOSPITAL | Age: 40
End: 2020-10-03

## 2020-10-06 ENCOUNTER — PATIENT MESSAGE (OUTPATIENT)
Dept: FAMILY MEDICINE | Facility: HOSPITAL | Age: 40
End: 2020-10-06

## 2020-10-09 ENCOUNTER — TELEPHONE (OUTPATIENT)
Dept: EMERGENCY MEDICINE | Facility: HOSPITAL | Age: 40
End: 2020-10-09

## 2020-10-29 ENCOUNTER — PATIENT MESSAGE (OUTPATIENT)
Dept: FAMILY MEDICINE | Facility: HOSPITAL | Age: 40
End: 2020-10-29

## 2020-10-31 ENCOUNTER — PATIENT MESSAGE (OUTPATIENT)
Dept: FAMILY MEDICINE | Facility: HOSPITAL | Age: 40
End: 2020-10-31

## 2020-11-02 ENCOUNTER — PROCEDURE VISIT (OUTPATIENT)
Dept: OBSTETRICS AND GYNECOLOGY | Facility: CLINIC | Age: 40
End: 2020-11-02
Payer: COMMERCIAL

## 2020-11-02 ENCOUNTER — OFFICE VISIT (OUTPATIENT)
Dept: OBSTETRICS AND GYNECOLOGY | Facility: CLINIC | Age: 40
End: 2020-11-02
Payer: COMMERCIAL

## 2020-11-02 VITALS
BODY MASS INDEX: 37.05 KG/M2 | SYSTOLIC BLOOD PRESSURE: 118 MMHG | DIASTOLIC BLOOD PRESSURE: 65 MMHG | WEIGHT: 250.88 LBS

## 2020-11-02 DIAGNOSIS — N83.202 CYST OF LEFT OVARY: Primary | ICD-10-CM

## 2020-11-02 DIAGNOSIS — R10.2 PELVIC PAIN IN FEMALE: ICD-10-CM

## 2020-11-02 DIAGNOSIS — N76.0 VULVOVAGINITIS: ICD-10-CM

## 2020-11-02 DIAGNOSIS — N83.202 CYST OF LEFT OVARY: ICD-10-CM

## 2020-11-02 PROCEDURE — 3008F BODY MASS INDEX DOCD: CPT | Mod: CPTII,S$GLB,, | Performed by: OBSTETRICS & GYNECOLOGY

## 2020-11-02 PROCEDURE — 76830 PR  ECHOGRAPHY,TRANSVAGINAL: ICD-10-PCS | Mod: S$GLB,,, | Performed by: OBSTETRICS & GYNECOLOGY

## 2020-11-02 PROCEDURE — 99203 OFFICE O/P NEW LOW 30 MIN: CPT | Mod: 25,S$GLB,, | Performed by: OBSTETRICS & GYNECOLOGY

## 2020-11-02 PROCEDURE — 99203 PR OFFICE/OUTPT VISIT, NEW, LEVL III, 30-44 MIN: ICD-10-PCS | Mod: 25,S$GLB,, | Performed by: OBSTETRICS & GYNECOLOGY

## 2020-11-02 PROCEDURE — 3078F PR MOST RECENT DIASTOLIC BLOOD PRESSURE < 80 MM HG: ICD-10-PCS | Mod: CPTII,S$GLB,, | Performed by: OBSTETRICS & GYNECOLOGY

## 2020-11-02 PROCEDURE — 76830 TRANSVAGINAL US NON-OB: CPT | Mod: S$GLB,,, | Performed by: OBSTETRICS & GYNECOLOGY

## 2020-11-02 PROCEDURE — 99999 PR PBB SHADOW E&M-EST. PATIENT-LVL III: CPT | Mod: PBBFAC,,, | Performed by: OBSTETRICS & GYNECOLOGY

## 2020-11-02 PROCEDURE — 3074F SYST BP LT 130 MM HG: CPT | Mod: CPTII,S$GLB,, | Performed by: OBSTETRICS & GYNECOLOGY

## 2020-11-02 PROCEDURE — 3008F PR BODY MASS INDEX (BMI) DOCUMENTED: ICD-10-PCS | Mod: CPTII,S$GLB,, | Performed by: OBSTETRICS & GYNECOLOGY

## 2020-11-02 PROCEDURE — 3074F PR MOST RECENT SYSTOLIC BLOOD PRESSURE < 130 MM HG: ICD-10-PCS | Mod: CPTII,S$GLB,, | Performed by: OBSTETRICS & GYNECOLOGY

## 2020-11-02 PROCEDURE — 99999 PR PBB SHADOW E&M-EST. PATIENT-LVL III: ICD-10-PCS | Mod: PBBFAC,,, | Performed by: OBSTETRICS & GYNECOLOGY

## 2020-11-02 PROCEDURE — 3078F DIAST BP <80 MM HG: CPT | Mod: CPTII,S$GLB,, | Performed by: OBSTETRICS & GYNECOLOGY

## 2020-11-02 RX ORDER — TERCONAZOLE 4 MG/G
1 CREAM VAGINAL NIGHTLY
Qty: 1 TUBE | Refills: 0 | Status: SHIPPED | OUTPATIENT
Start: 2020-11-02 | End: 2020-11-09

## 2020-11-02 RX ORDER — IBUPROFEN 600 MG/1
600 TABLET ORAL EVERY 6 HOURS PRN
Qty: 20 TABLET | Refills: 3 | Status: SHIPPED | OUTPATIENT
Start: 2020-11-02 | End: 2021-11-02

## 2020-11-02 NOTE — LETTER
November 10, 2020      Rob Sorto, DO  [address]  [facility] [address]           [place] [facility]  [address]  [place] [place] [address]  Phone: [phone]          Patient: Elva Caceres   MR Number: [id number]   YOB: 1980   Date of Visit: 11/2/2020       Dear Dr. Rob Sorto:    Thank you for referring Elva Caceres to me for evaluation. Attached you will find relevant portions of my assessment and plan of care.    If you have questions, please do not hesitate to call me. I look forward to following Elva Caceres along with you.    Sincerely,    William Savage MD    Enclosure  CC:  No Recipients    If you would like to receive this communication electronically, please contact [email or web address] or [phone] to request more information on [employer] Link access.    For providers and/or their staff who would like to refer a patient to [facility], please contact us through our one-stop-shop provider referral line, [facility], at [phone].    If you feel you have received this communication in error or would no longer like to receive these types of communications, please e-mail [email or web address]

## 2020-11-04 LAB
CANDIDA RRNA VAG QL PROBE: POSITIVE
G VAGINALIS RRNA GENITAL QL PROBE: NEGATIVE
T VAGINALIS RRNA GENITAL QL PROBE: NEGATIVE

## 2020-11-08 ENCOUNTER — TELEPHONE (OUTPATIENT)
Dept: OBSTETRICS AND GYNECOLOGY | Facility: CLINIC | Age: 40
End: 2020-11-08

## 2020-11-09 NOTE — TELEPHONE ENCOUNTER
AFFIRM resulted  Negative Trichomonads   Negative BV   Positive Candida sp    Rx for Diflucan sent to pharmacy   Other cultures ending     William Savage M.D.   OB/GYN    11/8/2020

## 2020-11-10 NOTE — PROGRESS NOTES
"GYNECOLOGY  :  Elva Caceres is a 40 y.o.No obstetric history on file.    Here today for  gyn evaluation   Refers pain and tenderness on RLQ overt the last month   seen @ [facility] , was told had ovarian cysts, discharged on Pain meds   Here for follow up. Pain is mild, but still feels it   No related to [place], positional changes. It Is just "there"   Normal menstrual cycles,   Hx of endometrial polyps , polypectomy in the past ( hysteroscopic)   Does not use reliable  birth control ( sometimes condoms only)     Past Medical History:   Diagnosis Date    Asthma     GERD with esophagitis     Hypertension     Left ovarian cyst      Past Surgical History:   Procedure Laterality Date    endometrial polypectomy      ESOPHAGOGASTRODUODENOSCOPY N/A 2020    Procedure: EGD (ESOPHAGOGASTRODUODENOSCOPY);  Surgeon: Dom Mckeon MD;  [facility];  Service: Endoscopy;  Laterality: N/A;     Family History   Problem Relation Age of Onset    Diabetes Father     Hypertension Father     Hypertension Mother      Social History     Tobacco Use    Smoking status: Former Smoker     Quit date: 2020     Years since quittin.3    Smokeless tobacco: Never Used   Substance Use Topics    Alcohol use: Yes     Alcohol/week: 0.0 standard drinks    Drug use: No     OB History   No obstetric history on file.       /65   Wt 113.8 kg (250 lb 14.1 oz)   LMP 2020 (Exact Date)   BMI 37.05 kg/m²     Last PAP=  normal   LMP = 20  Last Mammogram = -  Last Colonoscopy  =-      COMPREHENSIVE GYN HISTORY:  G 0 P 0     PAP History: Denies abnormal Paps.  Infection History: Denies STDs. Denies PID.  Benign History: Denies uterine fibroids. Denies ovarian cysts. Denies endometriosis.   Cancer History: Denies cervical cancer. Denies uterine cancer or hyperplasia. Denies ovarian cancer. Denies vulvar cancer or pre-cancer. Denies vaginal cancer or pre-cancer. Denies breast cancer. " Denies colon cancer.  Sexual Activity History: ( x ) Yes   ( - )   no   Menstrual History: Age of menarche: ( 15  )  years. Every (  30 )       days, flows for (  5 )   days. moderate  flow.  Dysmenorrhea History:  absent  Contraception:  Condoms sometimes     ROS  GENERAL: Denies significant weight gain or weight loss. Feeling well overall.  SKIN: Denies rash or lesions.  Normal skin turgor  HEAD: Denies head injury or headache.   NODES: Denies enlarged lymph nodes.   CHEST: Denies chest pain or shortness of breath.   CARDIOVASCULAR: Denies palpitations or left sided chest pain.   ABDOMEN: No abdominal pain,no  diarrhea,constipation  nausea, vomiting or rectal bleeding.   URINARY: normal  Frequency,no  Dysuria or burning on urination.   REPRODUCTIVE: Per HPI   BREASTS: The patient sometimes performs breast self-examination and denies pain, lumps, or nipple discharge.   HEMATOLOGIC: No easy bruisability or excessive bleeding.   MUSCULOSKELETAL: Denies joint pain or swelling.   NEUROLOGIC: Denies syncope or weakness.   PSYCHIATRIC: Denies depression, anxiety or mood swings.    Physical Exam     Constitutional: She is oriented to person, place, and time. She appears well-developed and well-nourished. No distress.   HENT:   Head: Normocephalic.   NECK: Neck symmetric without masses or thyromegaly.  Cardiovascular: Normal rate and regular rhythm.   Pulmonary/Chest: Effort normal and breath sounds normal. No respiratory distress. She has no wheezes.   Breasts: Symmetrical, no skin changes or visible lesions. No palpable masses, nipple discharge or adenopathy bilaterally.  Abdominal: Soft not distended. Bowel sounds are normal. She exhibits   no masses . No tenderness to palpation.   Genitourinary: Pelvic exam was performed with patient supine.   External genitalia normal no lesions.Normal hair distribution   Adequate perineal body,normal urethral meatus   Vagina moist and well rugated without lesions,withish  vaginal   Discharge.   Cervix pink and without lesions. No bleeding. No significant cystocele or rectocele.  Bimanual exam showed uterus normal size, shape and position , mobile and nontender.Mild tenderness on right lower quadrant, no masses , no cervical motion tenderness    Urethra and bladder normal  Extremities normal no cyanosis ,edema.     TVUS today   Normal uterus and endometrial cavity   Small fibroid 5 mm   Normal right ovary   Left ovary predominant follicle (resolving  cyst vs recent ovulation)     A/P Elva Caceres 40 y.o. No obstetric history on file.    Dx=  1- Pelvic pain      Resolving simple ovarian cyst   2- vulvovaginitis   3-    Procedures/Orders:  TVUS   AFFIRM     Assessment /Plan:  s/p normal breast exam   Counseled about TVUS results   NSAID's   WIll RTC in 4-6 week for follow up   If pain is persistent or worsening , will plan for diagnostic laparoscopy     Patient was counseled today on A.C.S. Pap guidelines and recommendations for yearly pelvic exams, mammograms and monthly self breast exams; to see her PCP for other health maintenance, nutrition and weight gain counseling, discussed lab values.  Discussed colonoscopy recommendations every 10 years starting at age 50   Calcium and vit D daily intake     F/u in 4-6 weeks       William Savage M.D.   OB/GYN

## 2020-11-13 ENCOUNTER — PATIENT MESSAGE (OUTPATIENT)
Dept: OBSTETRICS AND GYNECOLOGY | Facility: CLINIC | Age: 40
End: 2020-11-13

## 2020-11-13 RX ORDER — NORGESTIMATE AND ETHINYL ESTRADIOL 0.25-0.035
1 KIT ORAL DAILY
Qty: 30 TABLET | Refills: 11 | Status: SHIPPED | OUTPATIENT
Start: 2020-11-13 | End: 2021-11-13

## 2020-11-18 ENCOUNTER — PATIENT MESSAGE (OUTPATIENT)
Dept: FAMILY MEDICINE | Facility: HOSPITAL | Age: 40
End: 2020-11-18

## 2021-03-12 NOTE — ASSESSMENT & PLAN NOTE
3 days of esophageal dysphagia associated with solids  CT soft tissue of neck showed possible enlargement and early inflammation of the right tonsillar pillar w/ prominent lymph nodes anterior to the distal trachea in superior mediastinum.  CXR no sign of aspiration    Plan:  - GI consult, recs appreciated  - PPI BID  - carafate (liquid) QID  - clear liquid diet  - swallow study pending  - EGD done: biopsy taken. Normal esophagus. Erythematous mucosa in the gastric body. Normal examined duodenum.            2

## 2021-08-24 ENCOUNTER — CLINICAL SUPPORT (OUTPATIENT)
Dept: URGENT CARE | Facility: CLINIC | Age: 41
End: 2021-08-24
Payer: COMMERCIAL

## 2021-08-24 DIAGNOSIS — Z78.9 NO KNOWN HEALTH PROBLEMS: Primary | ICD-10-CM

## 2021-08-24 LAB
CTP QC/QA: YES
SARS-COV-2 RDRP RESP QL NAA+PROBE: NEGATIVE

## 2021-08-24 PROCEDURE — 99211 OFF/OP EST MAY X REQ PHY/QHP: CPT | Mod: S$GLB,,, | Performed by: NURSE PRACTITIONER

## 2021-08-24 PROCEDURE — 99211 PR OFFICE/OUTPT VISIT, EST, LEVL I: ICD-10-PCS | Mod: S$GLB,,, | Performed by: NURSE PRACTITIONER

## 2021-08-24 PROCEDURE — U0002: ICD-10-PCS | Mod: QW,S$GLB,, | Performed by: NURSE PRACTITIONER

## 2021-08-24 PROCEDURE — U0002 COVID-19 LAB TEST NON-CDC: HCPCS | Mod: QW,S$GLB,, | Performed by: NURSE PRACTITIONER

## 2025-03-05 ENCOUNTER — PATIENT MESSAGE (OUTPATIENT)
Dept: BARIATRICS | Facility: CLINIC | Age: 45
End: 2025-03-05
Payer: COMMERCIAL

## 2025-03-05 DIAGNOSIS — E66.01 MORBID OBESITY: ICD-10-CM

## 2025-03-05 DIAGNOSIS — K21.00 GASTROESOPHAGEAL REFLUX DISEASE WITH ESOPHAGITIS, UNSPECIFIED WHETHER HEMORRHAGE: ICD-10-CM

## 2025-03-05 DIAGNOSIS — I10 HYPERTENSION, UNSPECIFIED TYPE: ICD-10-CM

## 2025-03-06 ENCOUNTER — HOSPITAL ENCOUNTER (OUTPATIENT)
Dept: RADIOLOGY | Facility: HOSPITAL | Age: 45
Discharge: HOME | End: 2025-03-06
Attending: SURGERY
Payer: COMMERCIAL

## 2025-03-06 ENCOUNTER — TELEPHONE (OUTPATIENT)
Dept: BARIATRICS | Facility: CLINIC | Age: 45
End: 2025-03-06
Payer: COMMERCIAL

## 2025-03-06 ENCOUNTER — PATIENT MESSAGE (OUTPATIENT)
Dept: BARIATRICS | Facility: CLINIC | Age: 45
End: 2025-03-06

## 2025-03-06 ENCOUNTER — CLINICAL SUPPORT (OUTPATIENT)
Dept: BARIATRICS | Facility: CLINIC | Age: 45
End: 2025-03-06
Payer: COMMERCIAL

## 2025-03-06 DIAGNOSIS — I10 HYPERTENSION, UNSPECIFIED TYPE: ICD-10-CM

## 2025-03-06 DIAGNOSIS — Z71.3 DIETARY COUNSELING AND SURVEILLANCE: Primary | ICD-10-CM

## 2025-03-06 DIAGNOSIS — E66.01 MORBID OBESITY: ICD-10-CM

## 2025-03-06 DIAGNOSIS — K21.00 GASTROESOPHAGEAL REFLUX DISEASE WITH ESOPHAGITIS, UNSPECIFIED WHETHER HEMORRHAGE: ICD-10-CM

## 2025-03-06 PROCEDURE — 71046 X-RAY EXAM CHEST 2 VIEWS: CPT | Mod: TC

## 2025-03-06 PROCEDURE — 71046 X-RAY EXAM CHEST 2 VIEWS: CPT | Mod: 26,COE,, | Performed by: RADIOLOGY

## 2025-03-06 NOTE — TELEPHONE ENCOUNTER
Phoned patient. Reviewed history and explained TONE process.     TONE PT DR. BRUNER  Surgery-GBP  Completed Online Seminar-     PCP: [facility]    BMI:36.9 HT:5'9 WT:250    EPIC PT  ALL PREVIOUS NOTES AND PROCEDURES ARE IN [facility] AND CARE EVERYWHERE FROM [place].    THIS PT IS A CARRUM PT. SHE WAS WORKED UP THRU [facility] AND [place] FOR GBP SGY FOR JAN. 2025. SHE FOUND OUT ABOUT US AND SWITCHED OVER. SHE HAS ALREADY   SEEN PSYCH. SHE JUST NEEDS LABS CRX AND A CONSULT W/ DR REZA AND FRANC ALONG WITH DIET.    PMH:  HTN STABLE TAKES AMLODIPINE AND TRIAMTERENE   GERD LAST EGD 12/24 HAS HERNIA  ASTHMA LAST BOUT AT 13YO    COVID N  Vaccinated X1    GERD Questionnaire      PPI PANPOPRAZOLE QD   Typical heartburn N   Regurgitation N   Dysphagia solids N   Dysphagia liquids N   Hoarseness N   Sore throat N   Cough N   Asthma N   Chest pain N   Water brash N   Globus N   Nausea N   Vomiting N      Smoker- any form of Nicotine or Marijuana N    Take a flight of stairs without SOB- Y  Can ambulate without assistance- N    S - Y  T - N  O - N  P -Y  B -N  A - N  N - N  G - N  Score: 2    ROS:  Fever/infection: N  Dental: N  Sudden vision change: N  Cough/resp problems: N  BM: REG  Abnormal bleeding: N  UTI: N  Recent steroid tx: N  Stroke: N  Seizure: N  Blood thinners: N  Blood clot: N  Psych hx:    Past Surgeries:  TL AFTER C/S  C/S    Anesthesia problems: N    Fam hx:  Anesthesia problems: N  Heart problems:  Clots: N    Reviewed medications, allergies, and preferred pharmacy in [facility].    Works at [employer]  Lives with S/O AND KIDS  Caregiver will be MOM

## 2025-03-06 NOTE — PROGRESS NOTES
Audio Only Telehealth Visit     The patient location is: home  The chief complaint leading to consultation is: in work up for bariatric surgery  Visit type: Virtual visit with audio only (telephone)  Total time spent in medical discussion with patient: 20 minutes  Total time spent on date of the encounter:30 minutes       The reason for the audio only service rather than synchronous audio and video virtual visit was related to technical difficulties or patient preference/necessity.       Each patient to whom I provide medical services by telemedicine is:  (1) informed of the relationship between the physician and patient and the respective role of any other health care provider with respect to management of the patient; and (2) notified that they may decline to receive medical services by telemedicine and may withdraw from such care at any time. Patient verbally consented to receive this service via voice-only telephone call.     Bariatric Surgeon: Arnel Ang M.D.  Reason for MNT Referral: Pre-consult reminders  Procedure: Francisco-en-Y gastric bypass  Date of Consult: 4/2/2025 130 JBW and 2 PM Yumiko Fernádnez    Reminder/Discussion  Introduced self to PT as the dietitian he/she is scheduled to meet with at the bariatric clinic  Inquired if he/she received the bariatric nutrition guide, has started reading through it, and has started making changes to diet  Reminded PT to pay close attention to pages 6-7 and pages 13-14 regarding preparing for bariatric surgery and goals  Emphasized the importance of making dietary changes now so PT can be cleared for surgery when he/she comes to clinic for evaluation  Cautioned PT that if he/she has not made any progress towards adapting the bariatric lifestyle, surgery may be postponed    PT has received nutrition guide: No  PT has started reading through the nutrition guide: No  PT has started making changes to diet: No    PT expressed understanding and appreciation  All questions  answered within scope of care and practice.  Will send nutrition booklet via snail mail and portal.  Session time: 30 minutes                           This service was not originating from a related E/M service provided within the previous 7 days nor will  to an E/M service or procedure within the next 24 hours or my soonest available appointment.  Prevailing standard of care was able to be met in this audio-only visit.

## 2025-03-07 ENCOUNTER — PATIENT MESSAGE (OUTPATIENT)
Dept: BARIATRICS | Facility: CLINIC | Age: 45
End: 2025-03-07
Payer: COMMERCIAL

## 2025-03-07 DIAGNOSIS — E66.01 MORBID OBESITY: ICD-10-CM

## 2025-03-07 DIAGNOSIS — K21.00 GASTROESOPHAGEAL REFLUX DISEASE WITH ESOPHAGITIS, UNSPECIFIED WHETHER HEMORRHAGE: ICD-10-CM

## 2025-03-07 DIAGNOSIS — R06.02 SOB (SHORTNESS OF BREATH) ON EXERTION: ICD-10-CM

## 2025-03-07 DIAGNOSIS — I10 HYPERTENSION, UNSPECIFIED TYPE: ICD-10-CM

## 2025-03-10 ENCOUNTER — TELEPHONE (OUTPATIENT)
Dept: CARDIOLOGY | Facility: CLINIC | Age: 45
End: 2025-03-10
Payer: COMMERCIAL

## 2025-03-10 ENCOUNTER — PATIENT MESSAGE (OUTPATIENT)
Dept: BARIATRICS | Facility: CLINIC | Age: 45
End: 2025-03-10
Payer: COMMERCIAL

## 2025-03-10 ENCOUNTER — DOCUMENTATION ONLY (OUTPATIENT)
Dept: INTERNAL MEDICINE | Facility: CLINIC | Age: 45
End: 2025-03-10
Payer: COMMERCIAL

## 2025-03-10 ENCOUNTER — LAB VISIT (OUTPATIENT)
Dept: LAB | Facility: HOSPITAL | Age: 45
End: 2025-03-10
Attending: SURGERY
Payer: COMMERCIAL

## 2025-03-10 DIAGNOSIS — E66.01 MORBID OBESITY: ICD-10-CM

## 2025-03-10 DIAGNOSIS — K21.00 GASTROESOPHAGEAL REFLUX DISEASE WITH ESOPHAGITIS, UNSPECIFIED WHETHER HEMORRHAGE: ICD-10-CM

## 2025-03-10 DIAGNOSIS — I10 HYPERTENSION, UNSPECIFIED TYPE: ICD-10-CM

## 2025-03-10 LAB
25(OH)D3+25(OH)D2 SERPL-MCNC: 6 NG/ML (ref 30–96)
ALBUMIN SERPL BCP-MCNC: 3.5 G/DL (ref 3.5–5.2)
ALP SERPL-CCNC: 76 U/L (ref 40–150)
ALT SERPL W/O P-5'-P-CCNC: 26 U/L (ref 10–44)
ANION GAP SERPL CALC-SCNC: 9 MMOL/L (ref 8–16)
AST SERPL-CCNC: 42 U/L (ref 10–40)
BASOPHILS # BLD AUTO: 0.04 K/UL (ref 0–0.2)
BASOPHILS NFR BLD: 0.8 % (ref 0–1.9)
BILIRUB DIRECT SERPL-MCNC: 0.2 MG/DL (ref 0.1–0.3)
BILIRUB SERPL-MCNC: 0.5 MG/DL (ref 0.1–1)
BUN SERPL-MCNC: 12 MG/DL (ref 6–20)
CALCIUM SERPL-MCNC: 9.2 MG/DL (ref 8.7–10.5)
CHLORIDE SERPL-SCNC: 103 MMOL/L (ref 95–110)
CHOLEST SERPL-MCNC: 181 MG/DL (ref 120–199)
CHOLEST/HDLC SERPL: 4.5 {RATIO} (ref 2–5)
CO2 SERPL-SCNC: 24 MMOL/L (ref 23–29)
CREAT SERPL-MCNC: 0.7 MG/DL (ref 0.5–1.4)
DIFFERENTIAL METHOD BLD: ABNORMAL
EOSINOPHIL # BLD AUTO: 0.3 K/UL (ref 0–0.5)
EOSINOPHIL NFR BLD: 6.4 % (ref 0–8)
ERYTHROCYTE [DISTWIDTH] IN BLOOD BY AUTOMATED COUNT: 15.8 % (ref 11.5–14.5)
EST. GFR  (NO RACE VARIABLE): >60 ML/MIN/1.73 M^2
ESTIMATED AVG GLUCOSE: 117 MG/DL (ref 68–131)
FOLATE SERPL-MCNC: 6.3 NG/ML (ref 4–24)
GLUCOSE SERPL-MCNC: 78 MG/DL (ref 70–110)
HBA1C MFR BLD: 5.7 % (ref 4–5.6)
HCT VFR BLD AUTO: 40.5 % (ref 37–48.5)
HDLC SERPL-MCNC: 40 MG/DL (ref 40–75)
HDLC SERPL: 22.1 % (ref 20–50)
HGB BLD-MCNC: 12.8 G/DL (ref 12–16)
IMM GRANULOCYTES # BLD AUTO: 0.02 K/UL (ref 0–0.04)
IMM GRANULOCYTES NFR BLD AUTO: 0.4 % (ref 0–0.5)
IRON SERPL-MCNC: 39 UG/DL (ref 30–160)
LDLC SERPL CALC-MCNC: 127 MG/DL (ref 63–159)
LYMPHOCYTES # BLD AUTO: 1 K/UL (ref 1–4.8)
LYMPHOCYTES NFR BLD: 21.4 % (ref 18–48)
MAGNESIUM SERPL-MCNC: 1.8 MG/DL (ref 1.6–2.6)
MCH RBC QN AUTO: 26 PG (ref 27–31)
MCHC RBC AUTO-ENTMCNC: 31.6 G/DL (ref 32–36)
MCV RBC AUTO: 82 FL (ref 82–98)
MONOCYTES # BLD AUTO: 0.5 K/UL (ref 0.3–1)
MONOCYTES NFR BLD: 11 % (ref 4–15)
NEUTROPHILS # BLD AUTO: 2.8 K/UL (ref 1.8–7.7)
NEUTROPHILS NFR BLD: 60 % (ref 38–73)
NONHDLC SERPL-MCNC: 141 MG/DL
NRBC BLD-RTO: 0 /100 WBC
PHOSPHATE SERPL-MCNC: 2.9 MG/DL (ref 2.7–4.5)
PLATELET # BLD AUTO: 200 K/UL (ref 150–450)
PMV BLD AUTO: 11.7 FL (ref 9.2–12.9)
POTASSIUM SERPL-SCNC: 3.5 MMOL/L (ref 3.5–5.1)
PROT SERPL-MCNC: 9 G/DL (ref 6–8.4)
RBC # BLD AUTO: 4.92 M/UL (ref 4–5.4)
SATURATED IRON: 9 % (ref 20–50)
SODIUM SERPL-SCNC: 136 MMOL/L (ref 136–145)
T3 SERPL-MCNC: 126 NG/DL (ref 60–180)
T4 FREE SERPL-MCNC: 1 NG/DL (ref 0.71–1.51)
T4 SERPL-MCNC: 10.4 UG/DL (ref 4.5–11.5)
TOTAL IRON BINDING CAPACITY: 443 UG/DL (ref 250–450)
TRANSFERRIN SERPL-MCNC: 299 MG/DL (ref 200–375)
TRIGL SERPL-MCNC: 70 MG/DL (ref 30–150)
TSH SERPL DL<=0.005 MIU/L-ACNC: 0.93 UIU/ML (ref 0.4–4)
VIT B12 SERPL-MCNC: 864 PG/ML (ref 210–950)
WBC # BLD AUTO: 4.71 K/UL (ref 3.9–12.7)

## 2025-03-10 PROCEDURE — 83036 HEMOGLOBIN GLYCOSYLATED A1C: CPT | Performed by: SURGERY

## 2025-03-10 PROCEDURE — 80061 LIPID PANEL: CPT | Performed by: SURGERY

## 2025-03-10 PROCEDURE — 86677 HELICOBACTER PYLORI ANTIBODY: CPT | Performed by: SURGERY

## 2025-03-10 PROCEDURE — 82306 VITAMIN D 25 HYDROXY: CPT | Performed by: SURGERY

## 2025-03-10 PROCEDURE — 83735 ASSAY OF MAGNESIUM: CPT | Performed by: SURGERY

## 2025-03-10 PROCEDURE — 80076 HEPATIC FUNCTION PANEL: CPT | Performed by: SURGERY

## 2025-03-10 PROCEDURE — 82746 ASSAY OF FOLIC ACID SERUM: CPT | Performed by: SURGERY

## 2025-03-10 PROCEDURE — 84439 ASSAY OF FREE THYROXINE: CPT | Performed by: SURGERY

## 2025-03-10 PROCEDURE — 85025 COMPLETE CBC W/AUTO DIFF WBC: CPT | Performed by: SURGERY

## 2025-03-10 PROCEDURE — 82607 VITAMIN B-12: CPT | Performed by: SURGERY

## 2025-03-10 PROCEDURE — 84480 ASSAY TRIIODOTHYRONINE (T3): CPT | Performed by: SURGERY

## 2025-03-10 PROCEDURE — 84436 ASSAY OF TOTAL THYROXINE: CPT | Performed by: SURGERY

## 2025-03-10 PROCEDURE — 84425 ASSAY OF VITAMIN B-1: CPT | Performed by: SURGERY

## 2025-03-10 PROCEDURE — 80048 BASIC METABOLIC PNL TOTAL CA: CPT | Performed by: SURGERY

## 2025-03-10 PROCEDURE — 84100 ASSAY OF PHOSPHORUS: CPT | Performed by: SURGERY

## 2025-03-10 PROCEDURE — 84443 ASSAY THYROID STIM HORMONE: CPT | Performed by: SURGERY

## 2025-03-10 PROCEDURE — 84466 ASSAY OF TRANSFERRIN: CPT | Performed by: SURGERY

## 2025-03-10 NOTE — TELEPHONE ENCOUNTER
Spoke with patient concerning needed. Stated she is getting blood work today. Reminded she needed to be fasting. Also informed EKG can be done in office on Thursday.

## 2025-03-10 NOTE — PROGRESS NOTES
[facility]  Progress Note    Patient Name: Elva Caceres  MRN: [id number]  Date of Evaluation- 03/10/2025  PCP- [facility]        HPI:  HTN STABLE TAKES AMLODIPINE AND TRIAMTERENE   GERD LAST EGD 12/24 HAS HERNIA   ASTHMA LAST BOUT AT 13YO   TL AFTER C/S   C/S   Vocal cord paresis   Esophageal dysmotility Dysphagia noted no per nurse correspondence   Cyst of left ovary   Contraceptive; Estrogen and Progestin Combination    Suggest Gynecology input for     Della op Contraceptive; Estrogen and Progestin Combination- due to risk of thrombosis  Cyst of left ovary     Vocal cord paresis - obtain ENT evaluation   Will be useful  to the Anesthesiologist for intubation      Esophageal dysmotility Dysphagia noted no per nurse correspondence

## 2025-03-10 NOTE — HPI
HTN STABLE TAKES AMLODIPINE AND TRIAMTERENE   GERD LAST EGD 12/24 HAS HERNIA   ASTHMA LAST BOUT AT 11YO   TL AFTER C/S   C/S   Vocal cord paresis   Esophageal dysmotility Dysphagia noted no per nurse correspondence   Cyst of left ovary   Contraceptive; Estrogen and Progestin Combination    Suggest Gynecology input for     Della op Contraceptive; Estrogen and Progestin Combination- due to risk of thrombosis  Cyst of left ovary     Vocal cord paresis - obtain ENT evaluation   Will be useful  to the Anesthesiologist for intubation      Esophageal dysmotility Dysphagia noted no per nurse correspondence

## 2025-03-11 ENCOUNTER — PATIENT MESSAGE (OUTPATIENT)
Dept: BARIATRICS | Facility: CLINIC | Age: 45
End: 2025-03-11
Payer: COMMERCIAL

## 2025-03-11 ENCOUNTER — PATIENT MESSAGE (OUTPATIENT)
Dept: PSYCHIATRY | Facility: CLINIC | Age: 45
End: 2025-03-11
Payer: COMMERCIAL

## 2025-03-11 ENCOUNTER — TELEPHONE (OUTPATIENT)
Dept: BARIATRICS | Facility: CLINIC | Age: 45
End: 2025-03-11
Payer: COMMERCIAL

## 2025-03-12 ENCOUNTER — TELEPHONE (OUTPATIENT)
Dept: BARIATRICS | Facility: CLINIC | Age: 45
End: 2025-03-12
Payer: COMMERCIAL

## 2025-03-12 ENCOUNTER — CLINICAL SUPPORT (OUTPATIENT)
Dept: PSYCHIATRY | Facility: CLINIC | Age: 45
End: 2025-03-12
Payer: COMMERCIAL

## 2025-03-12 DIAGNOSIS — Z00.8 ENCOUNTER FOR PRE-SURGICAL PSYCHOLOGICAL ASSESSMENT: Primary | ICD-10-CM

## 2025-03-12 LAB — H PYLORI IGG SERPL QL IA: POSITIVE

## 2025-03-12 NOTE — TELEPHONE ENCOUNTER
Spoke w/ pt. RE consult date agreed upon for 4/2.  Arriving on 4/2  Leaving on 4/2  Tentative sgy date 5/1  I instructed them about the need of an IPAD, lap top, or desk top to complete the virtual phone call for the psych test. I also told them their appointments would start to show up in the my chart cheryl. I explained to them that psych would call the day before to remind them and if their appointment was on a Monday it would be on a Friday that they would get the reminder call for the 300 question test that would last about an hour to and hour and a half. I asked them to keep me informed of appointments/orders being done with the date so I can stay on top of it. I also explained the importance of the PCP agreement needing a signature before consult. Pt verbalized understanding of all info given. I will start the consult process.

## 2025-03-13 ENCOUNTER — TELEPHONE (OUTPATIENT)
Dept: BARIATRICS | Facility: CLINIC | Age: 45
End: 2025-03-13
Payer: COMMERCIAL

## 2025-03-13 ENCOUNTER — OFFICE VISIT (OUTPATIENT)
Dept: CARDIOLOGY | Facility: CLINIC | Age: 45
End: 2025-03-13
Payer: COMMERCIAL

## 2025-03-13 ENCOUNTER — RESULTS FOLLOW-UP (OUTPATIENT)
Dept: BARIATRICS | Facility: CLINIC | Age: 45
End: 2025-03-13

## 2025-03-13 VITALS
OXYGEN SATURATION: 97 % | BODY MASS INDEX: 43.4 KG/M2 | SYSTOLIC BLOOD PRESSURE: 122 MMHG | WEIGHT: 293 LBS | HEIGHT: 69 IN | DIASTOLIC BLOOD PRESSURE: 84 MMHG | HEART RATE: 103 BPM

## 2025-03-13 DIAGNOSIS — Z01.810 PRE-OPERATIVE CARDIOVASCULAR EXAMINATION: Primary | ICD-10-CM

## 2025-03-13 DIAGNOSIS — I10 HYPERTENSION, UNSPECIFIED TYPE: Primary | ICD-10-CM

## 2025-03-13 DIAGNOSIS — R06.02 SOB (SHORTNESS OF BREATH) ON EXERTION: ICD-10-CM

## 2025-03-13 DIAGNOSIS — I10 HYPERTENSION, UNSPECIFIED TYPE: ICD-10-CM

## 2025-03-13 DIAGNOSIS — I10 PRIMARY HYPERTENSION: ICD-10-CM

## 2025-03-13 DIAGNOSIS — E66.01 MORBID OBESITY: ICD-10-CM

## 2025-03-13 PROCEDURE — 3044F HG A1C LEVEL LT 7.0%: CPT | Mod: CPTII,S$GLB,COE, | Performed by: INTERNAL MEDICINE

## 2025-03-13 PROCEDURE — 1159F MED LIST DOCD IN RCRD: CPT | Mod: CPTII,S$GLB,COE, | Performed by: INTERNAL MEDICINE

## 2025-03-13 PROCEDURE — 93000 ELECTROCARDIOGRAM COMPLETE: CPT | Mod: S$GLB,COE,, | Performed by: INTERNAL MEDICINE

## 2025-03-13 PROCEDURE — 3008F BODY MASS INDEX DOCD: CPT | Mod: CPTII,S$GLB,COE, | Performed by: INTERNAL MEDICINE

## 2025-03-13 PROCEDURE — 99204 OFFICE O/P NEW MOD 45 MIN: CPT | Mod: 25,S$GLB,COE, | Performed by: INTERNAL MEDICINE

## 2025-03-13 PROCEDURE — 99999 PR PBB SHADOW E&M-EST. PATIENT-LVL III: CPT | Mod: PBBFAC,COE,, | Performed by: INTERNAL MEDICINE

## 2025-03-13 PROCEDURE — 3074F SYST BP LT 130 MM HG: CPT | Mod: CPTII,S$GLB,COE, | Performed by: INTERNAL MEDICINE

## 2025-03-13 PROCEDURE — 3079F DIAST BP 80-89 MM HG: CPT | Mod: CPTII,S$GLB,COE, | Performed by: INTERNAL MEDICINE

## 2025-03-13 NOTE — PROGRESS NOTES
Subjective:   @Patient ID:  Elva Caceres is a 44 y.o. female who presents for evaluation of preop for weight loss surgery    HPI:   2025:  Initial visit with me.  Referred by bariatric team for stress testing.  Plantar bariatric surgery.  She denies chest pain that reports shortness of breath with climbing 1 flight of stairs.  No significant lower extremities edema.    SH: No tobacco abuse, no etoh abuse    FH:No premature CAD     PMH: HTN  Past Medical History:   Diagnosis Date    Asthma     GERD with esophagitis     Hypertension     Left ovarian cyst           Prior cardiovascular  Hx  --------------------------------         - ECHO 2020  There is no evidence of intracardiac shunting.  There is left ventricular concentric remodeling.  Normal left ventricular systolic function. The estimated ejection fraction is 60%.  Normal LV diastolic function.  Normal central venous pressure (3 mmHg).  Negative bubble study        The ASCVD Risk score (Heron QUINTANILLA, et al., 2019) failed to calculate for the following reasons:    The smoking status is invalid      Patient Active Problem List    Diagnosis Date Noted    Vocal cord paresis 2020    Other dysphagia 2020    Esophageal dysmotility 2020    Cyst of left ovary 2020    Endometrial polyp 2020    Uterine leiomyoma 2020    HTN (hypertension) 2020    Gastroesophageal reflux disease with esophagitis 2020    Seasonal allergies 2020           Right Arm BP - Sittin/84  Left Arm BP - Sittin/85        LAST HbA1c  Lab Results   Component Value Date    HGBA1C 5.7 (H) 03/10/2025       Lipid panel  Lab Results   Component Value Date    CHOL 181 03/10/2025    CHOL 207 (H) 2020     Lab Results   Component Value Date    HDL 40 03/10/2025    HDL 52 2020     Lab Results   Component Value Date    LDLCALC 127.0 03/10/2025    LDLCALC 139.8 2020     Lab Results   Component Value Date    TRIG  70 03/10/2025    TRIG 76 09/11/2020     Lab Results   Component Value Date    CHOLHDL 22.1 03/10/2025    CHOLHDL 25.1 09/11/2020            Review of Systems   Constitutional: Negative for chills and fever.   HENT:  Negative for hearing loss and nosebleeds.    Eyes:  Negative for blurred vision.   Cardiovascular:         As in HPI    Respiratory:  Negative for cough and hemoptysis.    Endocrine: Negative for cold intolerance and polyuria.   Hematologic/Lymphatic: Negative for bleeding problem.   Skin:  Negative for itching.   Musculoskeletal:  Negative for falls.   Gastrointestinal:  Negative for abdominal pain and hematochezia.   Genitourinary:  Negative for hematuria.   Neurological:  Negative for dizziness and loss of balance.   Psychiatric/Behavioral:  Negative for altered mental status and depression.        Objective:   Physical Exam  Constitutional:       Appearance: She is well-developed.   HENT:      Head: Normocephalic and atraumatic.   Eyes:      Conjunctiva/sclera: Conjunctivae normal.   Neck:      Vascular: No carotid bruit or JVD.   Cardiovascular:      Rate and Rhythm: Normal rate and regular rhythm.      Pulses:           Carotid pulses are 2+ on the right side and 2+ on the left side.       Radial pulses are 2+ on the right side and 2+ on the left side.      Heart sounds: Normal heart sounds. No murmur heard.     No friction rub. No gallop.   Pulmonary:      Effort: Pulmonary effort is normal. No respiratory distress.      Breath sounds: Normal breath sounds. No stridor. No wheezing.   Abdominal:      General: Abdomen is flat.      Palpations: Abdomen is soft.   Musculoskeletal:      Cervical back: Neck supple.      Right lower leg: No edema.      Left lower leg: No edema.   Skin:     General: Skin is warm and dry.   Neurological:      Mental Status: She is alert and oriented to person, place, and time.   Psychiatric:         Behavior: Behavior normal.         Assessment:     1. Pre-operative  cardiovascular examination    2. Morbid obesity    3. BMI 36.0-36.9,adult    4. SOB (shortness of breath) on exertion        Plan:   1. Preop evaluation for bariatric surgery  Patient's denies chest pain.  Reports shortness of breath with climbing flights of stairs.   We will go ahead and proceed with exercise treadmill stress test  Check 2D echo   If no major abnormalities in the stress test and echocardiogram then okay to proceed with planned surgery    Pertinent cardiac images and EKG reviewed independently.    Continue with current medical plan and lifestyle changes.  Return sooner for concerns or questions. If symptoms persist go to the ED  I have reviewed all pertinent data including patient's medical history in detail and updated the computerized patient record.     Orders Placed This Encounter   Procedures    Exercise Stress - EKG     Standing Status:   Future     Expiration Date:   3/13/2026     Release to patient:   Immediate    Echo     Standing Status:   Future     Expiration Date:   3/13/2026     Release to patient:   Immediate       Follow up as scheduled.       -In today's visit, monitoring for drug toxicity was accomplished.     Greater than 50% of the visit was spent counseling, educating, and coordinating the care of the patient.     She expressed verbal understanding and agreed with the plan    Patient's Medications   New Prescriptions    No medications on file   Previous Medications    ACETAMINOPHEN (TYLENOL) 325 MG TABLET    Take 1 tablet (325 mg total) by mouth every 6 (six) hours as needed for Pain.    BREO ELLIPTA 100-25 MCG/DOSE DISKUS INHALER    TAKE 1 PUFF BY MOUTH EVERY DAY    CARBAMAZEPINE (TEGRETOL XR) 100 MG 12 HR TABLET    Take 1 tablet (100 mg total) by mouth 2 (two) times daily.    DICLOFENAC SODIUM (VOLTAREN) 1 % GEL    Apply 2 g topically 4 (four) times daily.    NORGESTIMATE-ETHINYL ESTRADIOL (ORTHO-CYCLEN) 0.25-35 MG-MCG PER TABLET    Take 1 tablet by mouth once daily.     OMEPRAZOLE (PRILOSEC) 20 MG CAPSULE    Take 1 capsule (20 mg total) by mouth 2 (two) times a day.    PROPYLENE GLYCOL (SYSTANE BALANCE) 0.6 % DROP    Apply 1 drop to eye 4 (four) times daily as needed.    TRIAMTERENE-HYDROCHLOROTHIAZIDE 37.5-25 MG (DYAZIDE) 37.5-25 MG PER CAPSULE    TAKE ONE CAPSULE BY MOUTH IN THE MORNING    VALACYCLOVIR (VALTREX) 1000 MG TABLET    Take 1 tablet (1,000 mg total) by mouth 3 (three) times daily. for 7 days   Modified Medications    No medications on file   Discontinued Medications    No medications on file        Fransisco Smith MD, FACC, RPVI  [facility]

## 2025-03-14 ENCOUNTER — PATIENT MESSAGE (OUTPATIENT)
Dept: BARIATRICS | Facility: CLINIC | Age: 45
End: 2025-03-14
Payer: COMMERCIAL

## 2025-03-14 LAB
OHS QRS DURATION: 90 MS
OHS QTC CALCULATION: 456 MS

## 2025-03-15 LAB — VIT B1 BLD-MCNC: 59 UG/L (ref 38–122)

## 2025-03-17 ENCOUNTER — OFFICE VISIT (OUTPATIENT)
Dept: OTOLARYNGOLOGY | Facility: CLINIC | Age: 45
End: 2025-03-17
Payer: COMMERCIAL

## 2025-03-17 VITALS — BODY MASS INDEX: 58.7 KG/M2 | WEIGHT: 293 LBS

## 2025-03-17 DIAGNOSIS — Z01.818 PRE-OP EXAMINATION: Primary | ICD-10-CM

## 2025-03-17 PROCEDURE — 99999 PR PBB SHADOW E&M-EST. PATIENT-LVL III: CPT | Mod: PBBFAC,COE,, | Performed by: PHYSICIAN ASSISTANT

## 2025-03-17 NOTE — PROGRESS NOTES
No chief complaint on file.        44 y.o. female presents for vocal cord evaluation.  She is currently being worked up by bariatric surgery with plans to schedule surgery in the future.  She does report a history of R vocal cord paresis in 2020. She had hoarseness and dysphagia. She reports that she was started on steroids and symptoms resolved. She did not require any surgery or injections as symptoms resolved. She recently started abx for H pylori.   She denies hoarseness, dysphagia, globus esnation, ear pain, sore throat, neck swelling.     No PSHx head/neck.   No history of radiation therapy.  Non smoker.       Review of Systems     Constitutional: Negative for fever  HENT: Per HPI  Eyes: Negative for visual disturbance.   Respiratory: Negative for shortness of breath  Cardiovascular: Negative for chest pain  Musculoskeletal: Negative for decreased ROM    Answers submitted by the patient for this visit:  Review of Symptoms Questionnaire  (Submitted on 3/16/2025)  None of these: Yes  None of these: Yes  None of these : Yes  None of these: Yes  None of these : Yes  None of these: Yes  None of these: Yes  back pain: Yes  None of these : Yes  None of these: Yes  None of these : Yes  None of these: Yes  None of these: Yes  None of these: Yes        Past Medical History:   Diagnosis Date    Asthma     GERD with esophagitis     Hypertension     Left ovarian cyst        Past Surgical History:   Procedure Laterality Date    endometrial polypectomy      ESOPHAGOGASTRODUODENOSCOPY N/A 9/21/2020    Procedure: EGD (ESOPHAGOGASTRODUODENOSCOPY);  Surgeon: Dom Mckeon MD;  Location: [facility];  Service: Endoscopy;  Laterality: N/A;       family history includes Diabetes in her father; Hypertension in her father and mother.    Pt  reports that she quit smoking about 4 years ago. She has never used smokeless tobacco. She reports current alcohol use. She reports that she does not use drugs.    Review of patient's allergies  indicates:  No Known Allergies     Physical Exam    There were no vitals filed for this visit.  Body mass index is 58.7 kg/m².    General: AOx3, NAD  Right Ear: External Auditory Canal WNL  Left Ear:  External Auditory Canal WNL  Nose: No gross nasal septal deviation.  Inferior Turbinates WNL bilaterally.  No septal perforation.  No masses/lesions.  Oral Cavity: FOM Soft, no masses palpated.  Oral Tongue mobile.  Hard Palate WNL.  Oropharynx: BOT WNL.  No masses/lesions noted.  Tonsillar fossa without lesions.  Soft palate without masses.  Midline uvula.  Neck: No palpable lymphadenopathy at I - VI.    Face: House Brackmann I bilaterally.  Eyes: Normal extra ocular motion bilaterally.    Nasopharynx - the torus is clear. There are no lesions of the posterior wall.   Oropharynx - no lesions of the tongue base. There is no obvious fullness or asymmetry.  Hypopharynx - there are no lesions of the pyriform sinuses or postcricoid region   Larynx - there are no lesions of the supraglottic or glottic larynx. Vocal fold mobility is normal with complete closure.     Procedure: Flexible laryngoscopy  In order to fully examine the upper aerodigestive tract, including the larynx, in a patient with a hyperactive gag reflex, flexible endoscopy is required.  After explaining the procedure and obtaining verbal consent, a timeout was performed with the patient's participation according to the universal protocol. Both nasal cavities were anesthetized with 4% Xylocaine spray mixed with Roberth-Synephrine. The flexible laryngoscope was inserted into the nasal cavity and advanced to visualize the nasal cavity, nasopharynx, the posterior oropharynx, hypopharynx, and the endolarynx with the above findings noted. The scope was removed and the procedure terminated. The patient tolerated this procedure well without apparent complication.           Assessment     1. Pre-op examination          Plan    Problem List Items Addressed This Visit     None  Visit Diagnoses         Pre-op examination    -  Primary          Pre-op for bariatric surgery - flexible laryngoscopy done due to history of vocal cord paresis in 2020.  Vocal cords are mobile, complete closure, no paresis noted on exam today. Denies hoarseness, dysphagia, globus sensation.   Follow up PRN.

## 2025-03-19 ENCOUNTER — OFFICE VISIT (OUTPATIENT)
Dept: PSYCHIATRY | Facility: CLINIC | Age: 45
End: 2025-03-19
Payer: COMMERCIAL

## 2025-03-19 DIAGNOSIS — K21.00 GASTROESOPHAGEAL REFLUX DISEASE WITH ESOPHAGITIS, UNSPECIFIED WHETHER HEMORRHAGE: ICD-10-CM

## 2025-03-19 DIAGNOSIS — E66.01 MORBID OBESITY: ICD-10-CM

## 2025-03-19 PROCEDURE — 99499 UNLISTED E&M SERVICE: CPT | Mod: 95,COE,, | Performed by: PSYCHOLOGIST

## 2025-03-19 NOTE — PROGRESS NOTES
Patient checked in for her 8am pre-surgery psychological evaluation at 8:33am. This would not give us enough time to complete the clinical interview. Therefore, the appointment was rescheduled for Monday, 3/24 at 8am.

## 2025-03-24 ENCOUNTER — OFFICE VISIT (OUTPATIENT)
Dept: PSYCHIATRY | Facility: CLINIC | Age: 45
End: 2025-03-24
Payer: COMMERCIAL

## 2025-03-24 DIAGNOSIS — I10 PRIMARY HYPERTENSION: ICD-10-CM

## 2025-03-24 DIAGNOSIS — Z01.818 PREOP EXAMINATION: Primary | ICD-10-CM

## 2025-03-24 DIAGNOSIS — E66.01 MORBID OBESITY DUE TO EXCESS CALORIES: ICD-10-CM

## 2025-03-24 DIAGNOSIS — K21.00 GASTROESOPHAGEAL REFLUX DISEASE WITH ESOPHAGITIS, UNSPECIFIED WHETHER HEMORRHAGE: ICD-10-CM

## 2025-03-24 NOTE — PROGRESS NOTES
PRESURGICAL PSYCHOLOGICAL EVALUATION - BARIATRICS  Psychiatry Initial Visit (PhD/PsyD)   Psychological Intake and Assessment    Site:  telemedicine    The patient location is: Patient's home in [place], [place]  The chief complaint leading to consultation is: pre-surgery evaluation    Visit type: audiovisual    Face to Face time with patient: 30 minutes  45 minutes of total time spent on the encounter, which includes face to face time and non-face to face time preparing to see the patient (eg, review of tests), Obtaining and/or reviewing separately obtained history, Documenting clinical information in the electronic or other health record, Independently interpreting results (not separately reported) and communicating results to the patient/family/caregiver, or Care coordination (not separately reported).     Each patient to whom he or she provides medical services by telemedicine is:  (1) informed of the relationship between the physician and patient and the respective role of any other health care provider with respect to management of the patient; and (2) notified that he or she may decline to receive medical services by telemedicine and may withdraw from such care at any time.    CPT Codes:  17355 (1 hour): Psychiatric Diagnostic Evaluation  43585 (1 hour): Integration of patient data, interpretation of standardized test results and clinical data, clinical decision-making, treatment planning and report, and interactive feedback to the patient  29929 (1 hour): Psychological or neuropsychological test administration, with single automated instrument via electronic platform, with automated result only:  [place] Multiphasic Personality Inventory - 3 (MMPI-3)    NAME: Elva Caceres  MRN: [id number]  : 1980    Date: 2025    Referral source: Arnel Espinosa Jr., M.D.    Clinical status of patient: Outpatient     Met With: Patient    Chief complaint/reason for encounter: Routine psychological evaluation  "prior to bariatric surgery.     Before this evaluation was initiated, the purposes and process of the assessment and the limits of confidentiality were discussed with the patient who expressed understanding of these issues and verbally consented to proceed with the evaluation.     Type of surgery sought: RNY Bypass      History of present illness:   Ms. Caceres is a 44-year-old  female who is pursuing bariatric surgery to improve her health and quality of life. She has no history of significant psychological difficulties. She has never taken psychotropic medication, has never been hospitalized for psychiatric reasons and denied any history of suicidality. She has begun making positive lifestyle changes in anticipation for surgery, with good benefit. The patient has a Body Mass Index of 58.70 as documented by the referring provider.    Ms. Caceres has struggled with weight since childhood. Factors that have contributed to her weight gain over the years include genetics, depression when lost her brother in 2020, and having two children. She was no longer doing her normal activity after having her first child. She used to do 3 to 4 miles of walking after work. Now she is always tired. She also had a Baker's cyst in her knee limiting her physical activity. She endorsed a history of emotional eating with stress as her primary trigger. She added after having her first child, "eating habits picked up and didn't want to do anything." She is working on developing the following skills to cope with her emotions more effectively: talking about it. Her motivation for seeking surgery now is "just want to feel healthy and energetic again, be able to do more things with my kids, be more mobile with them." Her postsurgical goals include "to lose the weight and maintain it and do more outside activities."    Ms. Caceres has met with Ms. Hilda RD, bariatric dietician, and reports that she demonstrated knowledge of " "healthy eating behaviors and exercise patterns, and she has made the following lifestyle changes since beginning the bariatric program: eating more vegetables and staying away from starches. She acknowledges that she does not have the ability to exercise because no one to watch the children. Her fiance works in the evening. She must continue meeting with Ms. Fernández to demonstrate the implementation of lifestyle changes prior to clearance for bariatric surgery.     Co-morbidities: HTN, GERD    Knowledge of surgery information:  - Basics of procedure: She told me it is removing almost half of the stomach, really lose a lot of weight." She completed the requirements for [facility] for bariatric surgery but was never contacted me back to get the process moving.  - Risks: Unaware of risks  - Basics of diet: Basic understanding    Pain: None reported.    Current Psychiatric Symptoms:   Depression - Denied depressed mood, loss of interest in pleasurable activities, anhedonia, sleep changes, decreased motivation, decreased concentration, feelings of excessive or irrational guilt, helplessness, hopelessness, increased or decreased appetite, weight changes, increased or decreased motor activity, decreased energy, suicidal ideations/thoughts of death.  Monica/Hypomania - Denied increased goal directed activity, decreased need for sleep, pressured speech or increased talkative, racing thoughts, increased risk-taking behavior, episodic elevated or irritable mood, flight of ideas, distractibility, inflated self-esteem, grandiosity  Anxiety - Denied excessive worry, difficulty controlling worrying, feeling keyed up, easily fatigued, difficulty concentrating or mind going blank, irritability, muscle tension, sleep disturbance, racing thoughts, being unable to relax, specific phobia.  Panic Attacks: Denied palpitations, sweating, trembling, dyspnea, choking sensation, chest pain/discomfort, nausea, dizziness, chills or hot flashes, " "tingling, derealization, fear of losing control, fear of dying.  Thoughts - Denied any AVH, paranoia, delusions, ideas of reference, thought insertion or thought broadcasting  Suicidal thoughts/behaviors - denied passive or active SI, denied suicidal plans or intent  Self-injury - denied.  Substance abuse - denied abuse or dependence.   Sleep - Denied increased sleep latency, frequent nighttime awakenings, or early morning awakening with inability to return to sleep.    Current psychiatric treatment:  Medications: Denied  Psychotherapy: Denied    Current Health Behaviors:  Compliant with medical regimens and appointments: Yes  Prescription medication misuse: No  Exercise: No  Adequate cognitive functioning: Yes    Past Psychiatric history:   Previous diagnosis: none  Previous psychiatric hospitalizations/inpatient treatment: none  History of outpatient treatment: none  Previous suicide attempt: none  Non-suicidal self-injury: none    Trauma history:  Denies.    PTSD: Denies re-experiencing trauma, nightmares, increased awareness of surroundings, hyperexcitability.    History of eating disorders:  History of bulimia: Denies recurrent episodes of eating then engaging in inappropriate compensatory behaviors.        History of binge-eating episodes: Denies eating excessive amounts of food within a discrete time period with a lack of control during eating.  She denied eating more rapidly than normal, eating until uncomfortably full, eating large amounts of food when not physically hungry, eating alone due to embarrassment, or negative emotions (i.e., disgusted, guilty, depressed) afterwards.    Family history of psychiatric illness: None reported.     Social history (marriage, employment, etc.): Ms. Caceres was born and raised in [place], [place] and [place] by her biological parents along with two sisters and two brothers. She described her childhood as "wonderful." She denied childhood trauma, abuse, and neglect. " "She reported school was rough because "I was always insecure about my weight and not being able to do that activities other kids could do." She did join the band and performed well in school. She worked at [employer] for a long time after high school. 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 She always had two jobs. She is currently employed in customer service at [employer]. She is not on disability and finances are stable. She is engaged to be . She has two children (ages 3 and 1). She currently lives with her fiance and her two children in [place], [place]. She identifies as Hinduism. She enjoys spending time with her children.    Current psychosocial stressors: The change of having two children is still an adjustment.     Report of coping skills/recreational activities: None currently.    Support system: her fimervin     Substance use:   Alcohol: Denied.  Drugs: Denied current use; denied history of abuse or dependency.  Tobacco: None.     Current medications and drug reactions (include OTC, herbal): see medication list     PSYCHOLOGICAL ASSESSMENT/TESTING:   All tests were administered according to standardized procedures and were selected based on the reason for referral.  The MMPI-3 provides an assessment of personality and psychopathology with specific evaluation of psychosocial risk factors associated with outcomes of bariatric surgery.   Ms. Caceres produced a valid and interpretable MMPI-3 protocol. Her test results should be considered a reasonably accurate reflection of her current psychological functioning.    TEST RESULTS. Ms. Caceres scores do not indicate any somatic, cognitive, emotional, thought, interpersonal, or behavioral dysfunction.  There are no specific treatment recommendations indicated by her profile.    FEEDBACK. Ms. Caceres was provided with test results, and offered the opportunity to respond to " feedback and clarify results if needed.      Mental Status Exam:   General Appearance:  age appropriate, well dressed, neatly groomed, overweight    Speech:  normal tone, normal rate, normal pitch, normal volume    Level of Cooperation:  cooperative    Thought Processes:  normal and logical    Mood:  euthymic    Thought Content:  normal, no suicidality, no homicidality, delusions, or paranoia    Affect:  congruent and appropriate    Orientation:  oriented x3    Memory:  recent memory intact; immediate and delayed word recall 3/3  remote memory intact; able to recall remote personal events   Attention Span & Concentration:  appropriate   Fund of General Knowledge:  appropriate for education    Abstract Reasoning:  Not directly assessed   Judgment & Insight:  good    Language  intact        SUMMARY AND RECOMMENDATIONS:  Ms. Caceres is a 44-year-old female referred for presurgical psychological evaluation prior to bariatric surgery. Results of personality testing should be considered valid, and they indicate that she is experiencing no acute psychiatric symptoms or declines in functioning at this time. Test results do not reveal any evidence that psychological difficulties would play a role in her recovery from surgery. Ms. Caceres's testing profile was largely consistent with her reports in the clinical interview. In the clinical interview, Ms. Caceres denied past psychiatric history and treatment.   There are no overt psychological contraindications for proceeding with bariatric surgery. Overall, Ms. Caceres is at LOW risk for adverse postsurgical outcomes based on the following considerations:  There are no indications of disabling psychopathology, substance use/abuse, cognitive problems, or disabilities that would prevent understanding and competence with medical treatment.  There are no reports or major psychosocial stressors that would interfere with her adherence to treatment recommendations.  There is no  evidence of suicidality.  She exhibits medium social stability and fair social support.  She has adequate coping strategies to deal with stress and the demands of surgery and recovery.  She has fair knowledge about the surgical procedure, fair knowledge about the required dietary and lifestyle changes, and inadequate understanding of possible risks of this treatment option. She reports adequate compliance with prior medical regimens.  There are no recommendations for psychological intervention at this time.    Diagnosis:    ICD-10-CM ICD-9-CM   1. Preop examination  Z01.818 V72.84   2. Morbid obesity due to excess calories  E66.01 278.01   3. Gastroesophageal reflux disease with esophagitis, unspecified whether hemorrhage  K21.00 530.11   4. Primary hypertension  I10 401.9   5. BMI 50.0-59.9, adult  Z68.43 V85.43     Plan: This report will be sent to the referring provider with impressions and recommendations. It will be the referring team's decision whether the patient proceeds with surgery. Services related to the presurgical psychological evaluation are now concluded.     Evaluation Length (direct face-to-face time): 30 minutes  Total Time including report writing, test scoring, chart review, integration of data and feedback: 45 minutes

## 2025-03-24 NOTE — Clinical Note
There are no overt psychological contraindications for proceeding with bariatric surgery. Overall, Ms. Caceres is at LOW risk for adverse postsurgical outcomes.

## 2025-04-02 ENCOUNTER — CLINICAL SUPPORT (OUTPATIENT)
Dept: BARIATRICS | Facility: CLINIC | Age: 45
End: 2025-04-02
Payer: COMMERCIAL

## 2025-04-02 ENCOUNTER — TELEPHONE (OUTPATIENT)
Dept: INTERNAL MEDICINE | Facility: CLINIC | Age: 45
End: 2025-04-02
Payer: COMMERCIAL

## 2025-04-02 ENCOUNTER — OFFICE VISIT (OUTPATIENT)
Dept: BARIATRICS | Facility: CLINIC | Age: 45
End: 2025-04-02
Payer: COMMERCIAL

## 2025-04-02 ENCOUNTER — OFFICE VISIT (OUTPATIENT)
Dept: INTERNAL MEDICINE | Facility: CLINIC | Age: 45
End: 2025-04-02
Payer: COMMERCIAL

## 2025-04-02 VITALS
BODY MASS INDEX: 43.4 KG/M2 | RESPIRATION RATE: 18 BRPM | SYSTOLIC BLOOD PRESSURE: 120 MMHG | DIASTOLIC BLOOD PRESSURE: 85 MMHG | HEART RATE: 98 BPM | TEMPERATURE: 99 F | OXYGEN SATURATION: 98 % | HEIGHT: 69 IN | WEIGHT: 293 LBS

## 2025-04-02 VITALS
OXYGEN SATURATION: 98 % | BODY MASS INDEX: 58.63 KG/M2 | WEIGHT: 293 LBS | HEART RATE: 98 BPM | SYSTOLIC BLOOD PRESSURE: 120 MMHG | DIASTOLIC BLOOD PRESSURE: 85 MMHG

## 2025-04-02 DIAGNOSIS — J45.909 CHILDHOOD ASTHMA, UNSPECIFIED ASTHMA SEVERITY, UNSPECIFIED WHETHER COMPLICATED, UNSPECIFIED WHETHER PERSISTENT: ICD-10-CM

## 2025-04-02 DIAGNOSIS — I10 PRIMARY HYPERTENSION: ICD-10-CM

## 2025-04-02 DIAGNOSIS — E55.9 VITAMIN D DEFICIENCY: ICD-10-CM

## 2025-04-02 DIAGNOSIS — M19.90 ARTHRITIS: ICD-10-CM

## 2025-04-02 DIAGNOSIS — R74.8 ELEVATED LIVER ENZYMES: ICD-10-CM

## 2025-04-02 DIAGNOSIS — J38.00 VOCAL CORD PARESIS: ICD-10-CM

## 2025-04-02 DIAGNOSIS — K21.00 GASTROESOPHAGEAL REFLUX DISEASE WITH ESOPHAGITIS, UNSPECIFIED WHETHER HEMORRHAGE: ICD-10-CM

## 2025-04-02 DIAGNOSIS — Z71.3 DIETARY COUNSELING AND SURVEILLANCE: Primary | ICD-10-CM

## 2025-04-02 DIAGNOSIS — E66.01 MORBID OBESITY: ICD-10-CM

## 2025-04-02 DIAGNOSIS — Z98.891 H/O CESAREAN SECTION: ICD-10-CM

## 2025-04-02 DIAGNOSIS — I10 HYPERTENSION, UNSPECIFIED TYPE: ICD-10-CM

## 2025-04-02 DIAGNOSIS — J30.2 SEASONAL ALLERGIES: Primary | ICD-10-CM

## 2025-04-02 DIAGNOSIS — N83.202 CYST OF LEFT OVARY: ICD-10-CM

## 2025-04-02 DIAGNOSIS — E66.01 MORBID OBESITY WITH BMI OF 50.0-59.9, ADULT: ICD-10-CM

## 2025-04-02 DIAGNOSIS — R73.03 PREDIABETES: ICD-10-CM

## 2025-04-02 PROBLEM — R13.19 OTHER DYSPHAGIA: Status: RESOLVED | Noted: 2020-09-20 | Resolved: 2025-04-02

## 2025-04-02 PROCEDURE — 99999 PR PBB SHADOW E&M-EST. PATIENT-LVL I: CPT | Mod: PBBFAC,COE,, | Performed by: DIETITIAN, REGISTERED

## 2025-04-02 PROCEDURE — 1160F RVW MEDS BY RX/DR IN RCRD: CPT | Mod: CPTII,S$GLB,COE, | Performed by: SURGERY

## 2025-04-02 PROCEDURE — 3079F DIAST BP 80-89 MM HG: CPT | Mod: CPTII,S$GLB,COE, | Performed by: SURGERY

## 2025-04-02 PROCEDURE — 99999 PR PBB SHADOW E&M-EST. PATIENT-LVL III: CPT | Mod: PBBFAC,COE,, | Performed by: HOSPITALIST

## 2025-04-02 PROCEDURE — 3074F SYST BP LT 130 MM HG: CPT | Mod: CPTII,S$GLB,COE, | Performed by: SURGERY

## 2025-04-02 PROCEDURE — 3008F BODY MASS INDEX DOCD: CPT | Mod: CPTII,S$GLB,COE, | Performed by: SURGERY

## 2025-04-02 PROCEDURE — 3044F HG A1C LEVEL LT 7.0%: CPT | Mod: CPTII,S$GLB,COE, | Performed by: SURGERY

## 2025-04-02 PROCEDURE — 1159F MED LIST DOCD IN RCRD: CPT | Mod: CPTII,S$GLB,COE, | Performed by: SURGERY

## 2025-04-02 PROCEDURE — 99999 PR PBB SHADOW E&M-EST. PATIENT-LVL III: CPT | Mod: PBBFAC,COE,, | Performed by: SURGERY

## 2025-04-02 PROCEDURE — 99204 OFFICE O/P NEW MOD 45 MIN: CPT | Mod: S$GLB,COE,, | Performed by: SURGERY

## 2025-04-02 RX ORDER — ERGOCALCIFEROL 1.25 MG/1
50000 CAPSULE ORAL
COMMUNITY
Start: 2025-03-11

## 2025-04-02 RX ORDER — FLUTICASONE PROPIONATE 50 MCG
1 SPRAY, SUSPENSION (ML) NASAL DAILY PRN
COMMUNITY
Start: 2024-12-03

## 2025-04-02 RX ORDER — AMLODIPINE BESYLATE 10 MG/1
10 TABLET ORAL DAILY
COMMUNITY

## 2025-04-02 RX ORDER — PANTOPRAZOLE SODIUM 40 MG/1
1 TABLET, DELAYED RELEASE ORAL EVERY MORNING
COMMUNITY
Start: 2024-12-31 | End: 2025-04-02

## 2025-04-02 RX ORDER — NAPROXEN SODIUM 220 MG
220 TABLET ORAL
COMMUNITY

## 2025-04-02 RX ORDER — CLARITHROMYCIN 500 MG/1
500 TABLET, FILM COATED ORAL 2 TIMES DAILY
COMMUNITY
Start: 2025-03-13 | End: 2025-04-02

## 2025-04-02 NOTE — ASSESSMENT & PLAN NOTE
She is under gynecology care and is not having any problems from a gynecological standpoint    She has regular menstrual cycles  And her last cycle was  and is expecting her cycle soon    She is not using any contraception and she had tubal ligation with a  she had with the her son in   She is sexually active with her male partner and to her understanding is not pregnant    No heavy cycles

## 2025-04-02 NOTE — OUTPATIENT SUBJECTIVE & OBJECTIVE
Outpatient Subjective & Objective     Chief complaint-Preoperative evaluation, Perioperative Medical management, complication reduction plan     Active cardiac conditions- none    Revised cardiac risk index predictors- none    Functional capacity -Examples of physical activity- she walks around the park, takes care of her children, her mother cares for her children while she is working,  can take 1 flight of stairs----- She can undertake all the above activities without  chest pain,chest tightness, Shortness of breath ,dizziness,lightheadedness making her exercise tolerance more,   than 4 Mets.       Review of Systems   Constitutional:  Negative for chills and fever.        Weight increased since she is off Wegovy for about 8 months   HENT:          STOPBANG score  / 8      Elevated BP  BMI> 35     Neck size over 40 CM     Eyes:         No new visual changes   Respiratory:          No cough , phlegm    No Hemoptysis   Cardiovascular:         As noted   Gastrointestinal:         No overt GI/ blood losses  Bowel movements- Regular    Endocrine:        Prednisone use > 20 mg daily for 3 weeks- none  No recurrence of sarcoidosis     Genitourinary:  Negative for dysuria.        No urinary hesitancy    Musculoskeletal:         As above       Skin:  Negative for rash.   Neurological:  Negative for syncope.        No unilateral weakness   Hematological:         Current use of Anticoagulants  None   Psychiatric/Behavioral:          No Depression,Anxiety  No eating disorders or purging       No past medical history pertinent negatives.    Quit tobacco in 2020 when she had sarcoidosis    No anesthesia, bleeding, cardiac problems , PONVwith previous surgeries/procedures.  Medications and Allergies reviewed in epic.     FH- No anesthesia,bleeding / venous thrombosis ,   in family    Father has had heart disease in his 60s Physical Exam  Blood pressure 120/85, pulse 98, temperature 99 °F (37.2 °C), temperature source Oral,  "resp. rate 18, height 5' 9" (1.753 m), weight (!) 180.2 kg (397 lb 2.5 oz), SpO2 98%.    I offered a sheet and the presence of a chaperone during physical examination   She was comfortable to proceed with the exam without the the presence of a chaperone        Physical Exam  Constitutional- Vitals - Body mass index is 58.65 kg/m².,   Vitals:    04/02/25 1200   BP: 120/85   Pulse: 98   Resp: 18   Temp: 99 °F (37.2 °C)     General appearance-Conscious,Coherent  Eyes- No conjunctival icterus,pupils  round  and reactive to light   ENT-Oral cavity- moist    , Hearing grossly normal   Neck- No thyromegaly ,Trachea -central, No jugular venous distension,   No Carotid Bruit   Cardiovascular -Heart Sounds- Normal  and  no murmur   , No gallop rhythm   Respiratory - Normal Respiratory Effort, Normal breath sounds,  no wheeze , and  no forced expiratory wheeze    Peripheral pitting pedal edema-- mild, no calf pain   Gastrointestinal -Soft abdomen, No palpable masses, Non Tender,Liver,Spleen not palpable. No-- free fluid and shifting dullness  Musculoskeletal- No finger Clubbing. Strength grossly normal   Lymphatic-No Palpable cervical, axillary,Inguinal lymphadenopathy   Psychiatric - normal effect,Orientation  Rt Dorsalis pedis pulses-palpable    Lt Dorsalis pedis pulses- palpable   Rt Posterior tibial pulses -palpable   Left posterior tibial pulses -palpable   Miscellaneous -  no dupuytren's contracture,  no renal bruit, and  tattoos   Investigations  Lab and Imaging have been reviewed in [facility].    Review of Medicine tests    EKG- I had independently reviewed the EKG from--March 2025  It was reported to be showing     Sinus tachycardia   Minimal voltage criteria for LVH, may be normal variant   Borderline Abnormal ECG   No previous ECGs available     Review of clinical lab tests:  Lab Results   Component Value Date    CREATININE 0.7 03/10/2025    HGB 12.8 03/10/2025     03/10/2025         Review of old records- Was " done and information gathered regards to events leading to surgery and health conditions of significance in the perioperative period         Review of old records- Was done and information gathered regards to events leading to surgery and health conditions of significance in the perioperative period.    Outpatient Subjective & Objective

## 2025-04-02 NOTE — ASSESSMENT & PLAN NOTE
Hemoglobin A1c in the pre diabetic range   Weight loss with diet and exercise , if able, helps lower A1c  Increased risk of becoming a diabetic .  Suggested follow-up     I suggest monitoring the glucose in the perioperative period as  glucose may be high from stress hyperglycemia in which case Insulin may be required

## 2025-04-02 NOTE — ASSESSMENT & PLAN NOTE
She is taking amlodipine, triamterene hydrochlorothiazide and addition of amlodipine has helped with the blood pressure control      Hypertension-  Blood pressure is acceptable . I suggest continuation of --amlodipine----- during the entire perioperative period. I suggest holding ---triamterene hydrochlorothiazide----- on the morning of the surgery and can continue that  post operatively under blood pressure, electrolyte and renal function monitoring as long as they are acceptable.I suggest addressing pain control as uncontrolled pain can increased blood pressure

## 2025-04-02 NOTE — ASSESSMENT & PLAN NOTE
Acid reflux    She is doing good from an acid reflux standpoint      Does not sound Cardiac       GERD-  I suggest continuation of the Proton pump inhibitor in the perioperative period . I suggest aspiration precautions    She had difficulty swallowing in 2020 when she had sarcoidosis but since then does not have any difficulty swallowing

## 2025-04-02 NOTE — ASSESSMENT & PLAN NOTE
She grew out of asthma and is no longer having any asthma problems that has not needing scheduled inhaler or as needed inhaler

## 2025-04-02 NOTE — PROGRESS NOTES
BARIATRIC NEW PATIENT EVALUATION    CHIEF COMPLAINT:   Morbid obesity, body mass index is 58.63 kg/m². and inability to lose weight.    HPI:  Elva Caceres is a 44 y.o. morbidly obese female. Her current body mass index is 58.63 kg/m². She has multiple associated comorbidities including essential hypertension.  Her highest adult weight was 397 lbs at age 44, and her lowest adult weight was 250lbs at age 39ish.  The patient has tried phentermine (Adipex-P) and Wegovy .  The patient was most successful with Adipex with a weight loss of 30lbs.  Her current exercise includes walking 2 times a week. She denies any history of eating disorder such as anorexia, bulimia, or taking laxatives for weight loss, and denies any addiction including illicit substances, alcohol, or gambling.  Patient states she has a good  support system.  She is currently employed in customer service .  She  denies a history of GERD.  The patient's goal is improve health.    PAST MEDICAL HISTORY:  Past Medical History:   Diagnosis Date    Asthma     GERD (gastroesophageal reflux disease)     GERD with esophagitis     Hyperlipidemia     Hypertension     Left ovarian cyst        PAST SURGICAL HISTORY:  Past Surgical History:   Procedure Laterality Date    c sections      endometrial polypectomy      ESOPHAGOGASTRODUODENOSCOPY N/A 2020    Procedure: EGD (ESOPHAGOGASTRODUODENOSCOPY);  Surgeon: Dom Mckeon MD;  Location: [facility];  Service: Endoscopy;  Laterality: N/A;    TUBAL LIGATION         FAMILY HISTORY:  Family History   Problem Relation Name Age of Onset    Diabetes Father      Hypertension Father      Hypertension Mother          SOCIAL HISTORY:  Social History     Socioeconomic History    Marital status: Single   Tobacco Use    Smoking status: Former     Current packs/day: 0.00     Types: Cigarettes     Quit date: 2020     Years since quittin.7    Smokeless tobacco: Never   Substance and Sexual Activity    Alcohol use:  Yes     Comment: Few times a year    Drug use: No    Sexual activity: Yes     Partners: Male     Birth control/protection: See Surgical Hx     Social Drivers of Health     Financial Resource Strain: Low Risk  (3/5/2025)    Overall Financial Resource Strain (CARDIA)     Difficulty of Paying Living Expenses: Not very hard   Food Insecurity: No Food Insecurity (3/5/2025)    Hunger Vital Sign     Worried About Running Out of Food in the Last Year: Never true     Ran Out of Food in the Last Year: Never true   Transportation Needs: No Transportation Needs (3/5/2025)    PRAPARE - Transportation     Lack of Transportation (Medical): No     Lack of Transportation (Non-Medical): No   Physical Activity: Insufficiently Active (3/5/2025)    Exercise Vital Sign     Days of Exercise per Week: 1 day     Minutes of Exercise per Session: 20 min   Stress: Stress Concern Present (3/5/2025)    [employer] [place] - Occupational Stress Questionnaire     Feeling of Stress : To some extent   Housing Stability: Low Risk  (3/5/2025)    Housing Stability Vital Sign     Unable to Pay for Housing in the Last Year: No     Number of Times Moved in the Last Year: 0     Homeless in the Last Year: No       MEDICATIONS:  Medications have been reviewed.    ALLERGIES:  Allergies have been reviewed.    Review of Systems   Constitutional:  Negative for chills, diaphoresis, fever and weight loss.   HENT:  Negative for congestion, ear discharge, ear pain, hearing loss, nosebleeds and tinnitus.    Eyes:  Negative for blurred vision, pain, discharge and redness.   Respiratory:  Negative for cough, sputum production, shortness of breath and wheezing.    Cardiovascular:  Negative for chest pain, palpitations, claudication and leg swelling.   Gastrointestinal:  Negative for abdominal pain, constipation, diarrhea, heartburn, nausea and vomiting.   Musculoskeletal:  Negative for back pain, joint pain, myalgias and neck pain.   Skin:  Negative  for itching and rash.   Neurological:  Negative for dizziness, tingling, tremors, weakness and headaches.   Psychiatric/Behavioral:  Negative for depression, hallucinations, substance abuse and suicidal ideas.        Vitals:    04/02/25 1325   BP: 120/85   Pulse: 98   SpO2: 98%   Weight: (!) 180.1 kg (397 lb)       Physical Exam  Constitutional:       General: She is not in acute distress.     Appearance: She is obese. She is not diaphoretic.   HENT:      Head: Normocephalic and atraumatic.      Right Ear: External ear normal.      Left Ear: External ear normal.   Eyes:      General: No scleral icterus.        Right eye: No discharge.         Left eye: No discharge.      Conjunctiva/sclera: Conjunctivae normal.   Neck:      Thyroid: No thyromegaly.      Trachea: No tracheal deviation.   Cardiovascular:      Rate and Rhythm: Normal rate and regular rhythm.   Pulmonary:      Effort: Pulmonary effort is normal. No respiratory distress.   Musculoskeletal:         General: No tenderness. Normal range of motion.   Skin:     General: Skin is warm and dry.      Coloration: Skin is not pale.      Findings: No erythema or rash.   Neurological:      Mental Status: She is alert and oriented to person, place, and time.   Psychiatric:         Mood and Affect: Mood and affect normal.         Cognition and Memory: Memory normal.         Judgment: Judgment normal.         DIAGNOSIS:  1. Morbid obesity, body mass index is 58.63 kg/m². and inability to lose weight.  2. Co-morbidities: essential hypertension    PLAN:  The patient is a good candidate for Bariatric Surgery. The patient is interested in laparoscopic toyin-en-y gastric bypass. The surgery and post-op care was discussed in detail with the patient. All questions were answered.    The patient understands that bariatric surgery is a tool to aid in weight loss and that they need to be committed to the diet and exercise post-operatively for successful weight loss. Discussed with  patient that bariatric surgery is not the easy way out and that it will take plenty of dedication on the patient's part to be successful. Also discussed the possibility of weight regain if the patient strays from the diet guidelines or exercise requirements. Patient verbalized understanding and wishes to proceed with the work-up.      ORDERS:  1. Stress Test, Chest X-Ray, and EKG  2. Psychological Consult, Bariatric Dietician Consult, and PCP Clearance  3. Bariatric Labs: Per orders.    PCP: [facility]  RTC: As scheduled.

## 2025-04-02 NOTE — PROGRESS NOTES
"NUTRITIONAL CONSULT    Referring Physician: Arnel Ang M.D.   Reason for MNT Referral: Initial assessment for Francisco-en-Y gastric bypass work-up    PAST MEDICAL HISTORY:   44 y.o. female    Weight history includes: Highest adult weight 397 lbs. Lowest adult weight 250 lbs at age 39.  Weight loss attempts include: Dieting on her own (cut back on fried foods, cut calories), Adipex, Wegovy. Most successful with Adipex with 30 lbs weight loss    Past Medical History:   Diagnosis Date    Asthma     GERD (gastroesophageal reflux disease)     GERD with esophagitis     Hyperlipidemia     Hypertension     Left ovarian cyst        CLINICAL DATA:  44 y.o.-year-old Black or  female.  Height: 5' 9"  Weight: 397 lbs  IBW: 153 lbs  BMI: 58.63  The patient's goal weight (50% EBW): 275 lbs  Personal goal weight: 250 lbs    Goal for Bariatric Surgery: to improve health, to improve quality of life, to lose weight, and be more mobile    DAILY NUTRITIONAL NEEDS: pre-op nutritional bariatric guidelines to promote weight loss  8175-0305 Calories    Grams Protein    NUTRITION & HEALTH HISTORY:  Greatest challenge: sweets, high-fat diet, and late night eating    Past diet recall:  Soda and sweet tea daily  Fried foods almost daily  Sweets  Eating late at night (boyfriend brings home fried food around 9 pm)    Current diet recall:     B: Coffee with coffee mate, low fat yogurt with sliced banana or granola  S: May have a donut, chips  L: Provided at work: tacos, meatloaf & mashed potato, steak, sometimes fries; or Canes chicken tenders and fries  D: At parents: Red/white beans and rice or chicken/pork chops with, rice and gravy or meatloaf/maryanne steak and mashed potatoes  S: 2 x week, something sweet like cookies or, ice cream    Current Diet:  Meal pattern: Regular  Protein supplements: Used to drink Premier Protein  Snackin-2 / day. May have chips, sweets  Vegetables: Likes  string beans, mustard greens, " corn, broccoli . Eats once per week.  Fruits: Likes  strawberries, bananas, kiwi, oranges, apples . Eats almost daily.  Beverages: water, coffee without sugar, and occasional sip of soda  Dining out/delivery:  5 days/week for lunch, 1/week for dinner  Mostly fast food and work cafeteria .  [employer] fish sandwich or pizza on Fridays  Cooking at home: Daily. Mostly baked/roast, grilled/broiled, boiled, smothered, and pan-fried/sauteed beef, pork, chicken/turkey, starchy CHO, vegetables, and beans    Food Allergies:   NKFA    Vitamins / Minerals / Herbs:   Vit D    Labs:   Reviewed.  Low Vit D    Exercise:  Past exercise:   Walking 3-4 miles daily    Current exercise:  Being active with children in park, walking    Restrictions to Exercise:  Baker's cyst in right knee, erupted    Social:  8:30 am-4:30pm M-F and 8:30am-12pm on Sat  Lives with partner, 2 children 3 y/o, 1.4 y/o.  Grocery shopping and food prep parents.  Patient believes the household will be supportive after surgery.  Alcohol: Special occasions.  Smoking: None.    ASSESSMENT:  Patient reports attempts at weight loss, only to regain lost weight.  Patient demonstrated knowledge of healthy eating behaviors and exercise patterns; admits to not eating healthy and not exercising at this point.  Patient demonstrates willingness to change lifestyle and make behavior modifications as evidenced by dietary changes and increased vegetables.    Barriers to Education: none    Stage of change: action    NUTRITION DIAGNOSIS:    Morbid Obesity related to Excessive carbohydrate intake, Excessive calorie intake, and Physical inactivity as evidence by BMI.    BARIATRIC DIET DISCUSSION/PLAN:  Discussed diet after surgery and related to patient's food record.  Reviewed nutrition guidelines for before and after surgery.  Answered all questions.  Reviewed protein goals  Discussed restarting protein shakes  Discussed high protein bars to try in place of sweets  Recommended PT  "switch to light Greek yogurt, looked at samples online  Reviewed pre- and post-op liquid diet, vitamins, and minerals  Work on gradually cutting back on starchy CHO in the diet.  Start including protein supplements in the diet plan daily.  Reduce frequency of eating out/delivery.  Work on cutting out "junk foods"    RECOMMENDATIONS:  Pt has been educated on the bariatric lifestyle  and may proceed with bariatric surgery    Follow up in one month. Needs additional visits with RD.    Patient verbalized understanding.    Communicated nutrition plan with bariatric team.    SESSION TIME:  60 minutes  "

## 2025-04-02 NOTE — ASSESSMENT & PLAN NOTE
History of vocal cord paresis-she had sarcoidosis in 2020 manifesting as change in voice-difficulty swallowing no other manifestations-she was treated with  steroids for to 3 months and they were tapered-no longer has sarcoidosis since around 2020  He had ENT evaluation recently-flexible laryngoscopy done due to history of vocal cord paresis in 2020.  Vocal cords are mobile, complete closure, no paresis noted on exam    Her voice has since returned to normal and has no difficulty swallowing and she has been discharge from the doctors who took care of her for sarcoidosis

## 2025-04-02 NOTE — PROGRESS NOTES
[facility]  Progress Note    Patient Name: Elva Caceres  MRN: [id number]  Date of Evaluation- 2025  PCP- [facility]        HPI:  History of present illness- I had the pleasure of meeting this pleasant 44 y.o. lady in the pre op clinic prior to her elective Abdominal surgery. The patient is new to me .    I have obtained the history by speaking to the patient and by reviewing the electronic health records.    Events leading up to surgery / History of presenting illness -    She is planning on having a bariatric surgery to be able to lose weight  She has longstanding weight problem but having had 2 children aged 1-1/2 years and 3 and a recent right knee problem with reduced mobility, this has been the heaviest she has been in the past    She took Wegovy but could not continue due to expense and the weight has come back and more  She tried diet and exercise but exercise is limited due to her right knee problem  She is doing good with no new problems on the right knee accept that she has arthritis of the right knee for which she was suggested to lose weight    She has no abdominal pain        Relevant health conditions of significance for the perioperative period/ History of presenting illness -    BMI 58.65  Hypertension-amlodipine, triamterene hydrochlorothiazide  Acid reflux  Childhood asthma  Tubal ligation   -2  History of vocal cord paresis-she had sarcoidosis in -she is going to steroids for to 3 months and they were tapered-no longer has sarcoidosis since around   He had ENT evaluation recently-flexible laryngoscopy done due to history of vocal cord paresis in .  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  Pets- none  Children -2  She is  currently not nursing  Pregnancies -2  Miscarriages -None  C sections -2  Has help post op    Not known to have   heart problem , Stroke/ Mini stroke ,Prediabetes , Diabetes Mellitus,  Thyroid problem, Kidney problem, deep vein thrombosis, pulmonary embolism,   sleep apnea, COVID infection, fatty liver , blood vessels stent , tobacco smoking, mental health problems , gout, allergies , constipation             Subjective/ Objective:     Chief complaint-Preoperative evaluation, Perioperative Medical management, complication reduction plan     Active cardiac conditions- none    Revised cardiac risk index predictors- none    Functional capacity -Examples of physical activity- she walks around the park, takes care of her children, her mother cares for her children while she is working,  can take 1 flight of stairs----- She can undertake all the above activities without  chest pain,chest tightness, Shortness of breath ,dizziness,lightheadedness making her exercise tolerance more,   than 4 Mets.       Review of Systems   Constitutional:  Negative for chills and fever.        Weight increased since she is off Wegovy for about 8 months   HENT:          STOPBANG score  / 8      Elevated BP  BMI> 35     Neck size over 40 CM     Eyes:         No new visual changes   Respiratory:          No cough , phlegm    No Hemoptysis   Cardiovascular:         As noted   Gastrointestinal:         No overt GI/ blood losses  Bowel movements- Regular    Endocrine:        Prednisone use > 20 mg daily for 3 weeks- none  No recurrence of sarcoidosis     Genitourinary:  Negative for dysuria.        No urinary hesitancy    Musculoskeletal:         As above       Skin:  Negative for rash.   Neurological:  Negative for syncope.        No unilateral weakness   Hematological:         Current use of Anticoagulants  None   Psychiatric/Behavioral:          No Depression,Anxiety  No eating disorders or purging       No past medical history pertinent  "negatives.    Quit tobacco in 2020 when she had sarcoidosis    No anesthesia, bleeding, cardiac problems , PONVwith previous surgeries/procedures.  Medications and Allergies reviewed in epic.     FH- No anesthesia,bleeding / venous thrombosis ,   in family    Father has had heart disease in his 60s Physical Exam  Blood pressure 120/85, pulse 98, temperature 99 °F (37.2 °C), temperature source Oral, resp. rate 18, height 5' 9" (1.753 m), weight (!) 180.2 kg (397 lb 2.5 oz), SpO2 98%.    I offered a sheet and the presence of a chaperone during physical examination   She was comfortable to proceed with the exam without the the presence of a chaperone        Physical Exam  Constitutional- Vitals - Body mass index is 58.65 kg/m².,   Vitals:    04/02/25 1200   BP: 120/85   Pulse: 98   Resp: 18   Temp: 99 °F (37.2 °C)     General appearance-Conscious,Coherent  Eyes- No conjunctival icterus,pupils  round  and reactive to light   ENT-Oral cavity- moist    , Hearing grossly normal   Neck- No thyromegaly ,Trachea -central, No jugular venous distension,   No Carotid Bruit   Cardiovascular -Heart Sounds- Normal  and  no murmur   , No gallop rhythm   Respiratory - Normal Respiratory Effort, Normal breath sounds,  no wheeze , and  no forced expiratory wheeze    Peripheral pitting pedal edema-- mild, no calf pain   Gastrointestinal -Soft abdomen, No palpable masses, Non Tender,Liver,Spleen not palpable. No-- free fluid and shifting dullness  Musculoskeletal- No finger Clubbing. Strength grossly normal   Lymphatic-No Palpable cervical, axillary,Inguinal lymphadenopathy   Psychiatric - normal effect,Orientation  Rt Dorsalis pedis pulses-palpable    Lt Dorsalis pedis pulses- palpable   Rt Posterior tibial pulses -palpable   Left posterior tibial pulses -palpable   Miscellaneous -  no dupuytren's contracture,  no renal bruit, and  tattoos   Investigations  Lab and Imaging have been reviewed in epic.    Review of Medicine tests    EKG- " I had independently reviewed the EKG from--March 2025  It was reported to be showing     Sinus tachycardia   Minimal voltage criteria for LVH, may be normal variant   Borderline Abnormal ECG   No previous ECGs available     Review of clinical lab tests:  Lab Results   Component Value Date    CREATININE 0.7 03/10/2025    HGB 12.8 03/10/2025     03/10/2025         Review of old records- Was done and information gathered regards to events leading to surgery and health conditions of significance in the perioperative period         Review of old records- Was done and information gathered regards to events leading to surgery and health conditions of significance in the perioperative period.        Preoperative cardiac risk assessment-  The patient does not have any active cardiac conditions . Revised cardiac risk index predictors-0 ---.Functional capacity is more than 4 Mets. She will be undergoing a Abdominal procedure that carries a Moderate Risk risk     Risk of a major Cardiac event ( Defined as death, myocardial infarction, or cardiac arrest at 30 days after noncardiac surgery), based on RCRI score     3.9%        Having cardiac testing on April 9th       American Society of Anesthesiologists Physical status classification ( ASA ) class: 3  Postoperative pulmonary complication risk assessment:      ARISCAT ( Canet) risk index- risk class -  Low  Assessment/Plan:     Seasonal allergies  Doing good from an allergy standpoint and does not have any eczema           Vocal cord paresis  History of vocal cord paresis-she had sarcoidosis in 2020 manifesting as change in voice-difficulty swallowing no other manifestations-she was treated with  steroids for to 3 months and they were tapered-no longer has sarcoidosis since around 2020  He had ENT evaluation recently-flexible laryngoscopy done due to history of vocal cord paresis in 2020.  Vocal cords are mobile, complete closure, no paresis noted on exam    Her voice has  since returned to normal and has no difficulty swallowing and she has been discharge from the doctors who took care of her for sarcoidosis    HTN (hypertension)  She is taking amlodipine, triamterene hydrochlorothiazide and addition of amlodipine has helped with the blood pressure control      Hypertension-  Blood pressure is acceptable . I suggest continuation of --amlodipine----- during the entire perioperative period. I suggest holding ---triamterene hydrochlorothiazide----- on the morning of the surgery and can continue that  post operatively under blood pressure, electrolyte and renal function monitoring as long as they are acceptable.I suggest addressing pain control as uncontrolled pain can increased blood pressure      Cyst of left ovary  She is under gynecology care and is not having any problems from a gynecological standpoint    She has regular menstrual cycles  And her last cycle was  and is expecting her cycle soon    She is not using any contraception and she had tubal ligation with a  she had with the her son in   She is sexually active with her male partner and to her understanding is not pregnant    No heavy cycles    Gastroesophageal reflux disease with esophagitis  Acid reflux    She is doing good from an acid reflux standpoint      Does not sound Cardiac       GERD-  I suggest continuation of the Proton pump inhibitor in the perioperative period . I suggest aspiration precautions    She had difficulty swallowing in  when she had sarcoidosis but since then does not have any difficulty swallowing    Childhood asthma  She grew out of asthma and is no longer having any asthma problems that has not needing scheduled inhaler or as needed inhaler    H/O  section  Twice  2023 and 2022  She had  in 2023 once she completed her family    BMI 50.0-59.9, adult  BMI 58.65    Weight related conditions     Known to have     HTN  Hyperlipidemia    Acid reflux    Osteoarthritis    Not troubled with / Not known to have       Type 2 Diabetes   Prediabetes   Gout   Sleep apnea   Fatty liver     Encouraged maintaining healthy weight for improved health     Arthritis  Rt knee  She had problems with right-sided Baker cyst in 2023 that has since resolved  She is taking naproxen as needed about 2 times a day  Discussed the naproxen can cause acid reflux and I suggested for her not to take it 1 week before surgery and if she was to have gastric bypass that she can not take anti-inflammatory medication in future due to risk of ulcer    Vitamin D deficiency  She recently started vitamin-D replacement twice a week a few weeks ago    Elevated liver enzymes  She has mildly elevated AST and elevated total protein   She is not known to have liver problems   She is not known to have any inflammation, liver or kidney problems or myeloma    We discussed further evaluation with the ultrasound scan, hepatology but she went through her C-sections in 2023 and 2022 with no bleeding problems or liver problems    She wants me for further evaluation to a later time      No history  of cirrhosis of liver or suggestions of Liver  decompensation   No Jaundice , dark urine , pale stool     Suggest care with usage of Medication that are hepatotoxic or  Metabolized in the liver      Naproxen could be contributing to her liver function abnormality    Prediabetes  Hemoglobin A1c in the pre diabetic range   Weight loss with diet and exercise , if able, helps lower A1c  Increased risk of becoming a diabetic .  Suggested follow-up     I suggest monitoring the glucose in the perioperative period as  glucose may be high from stress hyperglycemia in which case Insulin may be required          Preventive perioperative care    Thromboembolic prophylaxis:  Her risk factors for thrombosis include surgical procedure.I suggest  thromboembolic prophylaxis ( mechanical/pharmacological, weighing the risk  benefits of pharmacological agent use considering [facility] procedural bleeding )  during the perioperative period.I suggested being active in the post operative period.      Postoperative pulmonary complication prophylaxis-Risk factors for post operative pulmonary complications include ASA class >2 and proximity of the surgical site to the lungs- I suggest incentive spirometry use, early ambulation, end tidal carbon dioxide monitoring, and pain control so as to avoid diaphragmatic splinting  , oral care , head end of bed elevation      Renal complication prophylaxis-Risk factors for renal complications include hypertension . I suggest keeping her well hydrated  in the perioperative period.     Surgical site Infection Prophylaxis-I  suggest appropriate antibiotic for Prophylaxis against Surgical site infections  No reported Staph infection  Skin antibacterial discussed       This visit was focused on Preoperative evaluation, Perioperative Medical management, complication reduction plans. I suggest that the patient follows up with primary care or relevant sub specialists for ongoing health care.    I appreciate the opportunity to be involved in this patients care. Please feel free to contact me if there were any questions about this consultation.    Patient is optimized    Patient/ care giver/ Family member was instructed to call and update me about any changes to health,  medication, office visits ,testing out side of the [facility] , hospitalizations between now and surgery      Taina Salmeron MD  Internal Medicine  [facility]   Cell Phone- [phone]    History of COVID -  no   COVID vaccination status -yes    COVID screening     No fever   No cough   No SOB  No sore throat   No loss of taste or smell   No muscle aches   No nausea, vomiting , diarrhea       I have spent ---60--- minutes of time which includes, time spent to prepare to see the patient , obtaining history  ,performing examination, counseling/Educating the patient , Documenting clinical information in the record    --    4/2- 1551    Checked for over-the-counter medication

## 2025-04-02 NOTE — HPI
History of present illness- I had the pleasure of meeting this pleasant 44 y.o. lady in the pre op clinic prior to her elective Abdominal surgery. The patient is new to me .    I have obtained the history by speaking to the patient and by reviewing the electronic health records.    Events leading up to surgery / History of presenting illness -    She is planning on having a bariatric surgery to be able to lose weight  She has longstanding weight problem but having had 2 children aged 1-1/2 years and 3 and a recent right knee problem with reduced mobility, this has been the heaviest she has been in the past    She took Wegovy but could not continue due to expense and the weight has come back and more  She tried diet and exercise but exercise is limited due to her right knee problem  She is doing good with no new problems on the right knee accept that she has arthritis of the right knee for which she was suggested to lose weight    She has no abdominal pain        Relevant health conditions of significance for the perioperative period/ History of presenting illness -    BMI 58.65  Hypertension-amlodipine, triamterene hydrochlorothiazide  Acid reflux  Childhood asthma  Tubal ligation   -2  History of vocal cord paresis-she had sarcoidosis in -she is going to steroids for to 3 months and they were tapered-no longer has sarcoidosis since around   He had ENT evaluation recently-flexible laryngoscopy done due to history of vocal cord paresis in 2020.  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

## 2025-04-02 NOTE — ASSESSMENT & PLAN NOTE
She has mildly elevated AST and elevated total protein   She is not known to have liver problems   She is not known to have any inflammation, liver or kidney problems or myeloma    We discussed further evaluation with the ultrasound scan, hepatology but she went through her C-sections in 2023 and 2022 with no bleeding problems or liver problems    She wants me for further evaluation to a later time      No history  of cirrhosis of liver or suggestions of Liver  decompensation   No Jaundice , dark urine , pale stool     Suggest care with usage of Medication that are hepatotoxic or  Metabolized in the liver      Naproxen could be contributing to her liver function abnormality

## 2025-04-02 NOTE — ASSESSMENT & PLAN NOTE
BMI 58.65    Weight related conditions     Known to have     HTN  Hyperlipidemia   Acid reflux    Osteoarthritis    Not troubled with / Not known to have       Type 2 Diabetes   Prediabetes   Gout   Sleep apnea   Fatty liver     Encouraged maintaining healthy weight for improved health

## 2025-04-02 NOTE — ASSESSMENT & PLAN NOTE
Rt knee  She had problems with right-sided Baker cyst in 2023 that has since resolved  She is taking naproxen as needed about 2 times a day  Discussed the naproxen can cause acid reflux and I suggested for her not to take it 1 week before surgery and if she was to have gastric bypass that she can not take anti-inflammatory medication in future due to risk of ulcer

## 2025-04-03 DIAGNOSIS — I10 HYPERTENSION, UNSPECIFIED TYPE: ICD-10-CM

## 2025-04-03 DIAGNOSIS — K21.00 GASTROESOPHAGEAL REFLUX DISEASE WITH ESOPHAGITIS, UNSPECIFIED WHETHER HEMORRHAGE: ICD-10-CM

## 2025-04-03 DIAGNOSIS — E66.01 MORBID OBESITY: ICD-10-CM

## 2025-04-09 ENCOUNTER — HOSPITAL ENCOUNTER (OUTPATIENT)
Dept: CARDIOLOGY | Facility: HOSPITAL | Age: 45
Discharge: HOME | End: 2025-04-09
Attending: INTERNAL MEDICINE
Payer: COMMERCIAL

## 2025-04-09 VITALS
HEIGHT: 69 IN | BODY MASS INDEX: 43.4 KG/M2 | WEIGHT: 293 LBS | BODY MASS INDEX: 43.4 KG/M2 | WEIGHT: 293 LBS | HEIGHT: 69 IN

## 2025-04-09 DIAGNOSIS — R06.02 SOB (SHORTNESS OF BREATH) ON EXERTION: ICD-10-CM

## 2025-04-09 DIAGNOSIS — Z01.810 PRE-OPERATIVE CARDIOVASCULAR EXAMINATION: ICD-10-CM

## 2025-04-09 LAB
APICAL FOUR CHAMBER EJECTION FRACTION: 65 %
APICAL TWO CHAMBER EJECTION FRACTION: 62 %
ASCENDING AORTA: 3.33 CM
AV INDEX (PROSTH): 0.72
AV MEAN GRADIENT: 5 MMHG
AV PEAK GRADIENT: 8 MMHG
AV VALVE AREA BY VELOCITY RATIO: 2.6 CM²
AV VALVE AREA: 3.3 CM²
AV VELOCITY RATIO: 0.57
BSA FOR ECHO PROCEDURE: 2.96 M2
CV ECHO LV RWT: 0.41 CM
CV STRESS BASE HR: 117 BPM
DIASTOLIC BLOOD PRESSURE: 84 MMHG
DOP CALC AO PEAK VEL: 1.4 M/S
DOP CALC AO VTI: 21.8 CM
DOP CALC LVOT AREA: 4.5 CM2
DOP CALC LVOT DIAMETER: 2.4 CM
DOP CALC LVOT PEAK VEL: 0.8 M/S
DOP CALC LVOT STROKE VOLUME: 71 CM3
DOP CALC MV VTI: 16.2 CM
DOP CALCLVOT PEAK VEL VTI: 15.7 CM
E WAVE DECELERATION TIME: 139 MSEC
E/A RATIO: 0.81
E/E' RATIO: 9 M/S
ECHO LV POSTERIOR WALL: 1 CM (ref 0.6–1.1)
FRACTIONAL SHORTENING: 28.6 % (ref 28–44)
HR MV ECHO: 111 BPM
INTERVENTRICULAR SEPTUM: 0.9 CM (ref 0.6–1.1)
IVC DIAMETER: 1.76 CM
LEFT ATRIUM AREA SYSTOLIC (APICAL 2 CHAMBER): 16.06 CM2
LEFT ATRIUM AREA SYSTOLIC (APICAL 4 CHAMBER): 19.69 CM2
LEFT ATRIUM VOLUME INDEX MOD: 19 ML/M2
LEFT ATRIUM VOLUME MOD: 53 ML
LEFT INTERNAL DIMENSION IN SYSTOLE: 3.5 CM (ref 2.1–4)
LEFT VENTRICLE DIASTOLIC VOLUME INDEX: 40.94 ML/M2
LEFT VENTRICLE DIASTOLIC VOLUME: 113 ML
LEFT VENTRICLE END DIASTOLIC VOLUME APICAL 2 CHAMBER: 80.85 ML
LEFT VENTRICLE END DIASTOLIC VOLUME APICAL 4 CHAMBER: 112.87 ML
LEFT VENTRICLE END SYSTOLIC VOLUME APICAL 2 CHAMBER: 41.93 ML
LEFT VENTRICLE END SYSTOLIC VOLUME APICAL 4 CHAMBER: 64.37 ML
LEFT VENTRICLE MASS INDEX: 59.5 G/M2
LEFT VENTRICLE SYSTOLIC VOLUME INDEX: 18.5 ML/M2
LEFT VENTRICLE SYSTOLIC VOLUME: 51 ML
LEFT VENTRICULAR INTERNAL DIMENSION IN DIASTOLE: 4.9 CM (ref 3.5–6)
LEFT VENTRICULAR MASS: 164.3 G
LV LATERAL E/E' RATIO: 7.5 M/S
LV SEPTAL E/E' RATIO: 12.5 M/S
LVED V (TEICH): 113.25 ML
LVES V (TEICH): 51.49 ML
LVOT MG: 1.39 MMHG
LVOT MV: 0.56 CM/S
MV A" WAVE DURATION": 111.32 MSEC
MV MEAN GRADIENT: 3 MMHG
MV PEAK A VEL: 0.93 M/S
MV PEAK E VEL: 0.75 M/S
MV PEAK GRADIENT: 5 MMHG
MV STENOSIS PRESSURE HALF TIME: 40.43 MS
MV VALVE AREA BY CONTINUITY EQUATION: 4.38 CM2
MV VALVE AREA P 1/2 METHOD: 5.44 CM2
OHS CV CPX 1 MINUTE RECOVERY HEART RATE: 153 BPM
OHS CV CPX 85 PERCENT MAX PREDICTED HEART RATE MALE: 150
OHS CV CPX ESTIMATED METS: 5
OHS CV CPX MAX PREDICTED HEART RATE: 177
OHS CV CPX PATIENT IS FEMALE: 1
OHS CV CPX PATIENT IS MALE: 0
OHS CV CPX PEAK DIASTOLIC BLOOD PRESSURE: 75 MMHG
OHS CV CPX PEAK HEAR RATE: 171 BPM
OHS CV CPX PEAK RATE PRESSURE PRODUCT: NORMAL
OHS CV CPX PEAK SYSTOLIC BLOOD PRESSURE: 219 MMHG
OHS CV CPX PERCENT MAX PREDICTED HEART RATE ACHIEVED: 102
OHS CV CPX RATE PRESSURE PRODUCT PRESENTING: NORMAL
OHS CV RV/LV RATIO: 0.73 CM
OHS LV EJECTION FRACTION SIMPSONS BIPLANE MOD: 63 %
PISA TR MAX VEL: 2.3 M/S
PV MV: 0.95 M/S
PV PEAK GRADIENT: 7 MMHG
PV PEAK VELOCITY: 1.34 M/S
RA PRESSURE ESTIMATED: 3 MMHG
RA VOL SYS: 24.44 ML
RIGHT ATRIAL AREA: 11.3 CM2
RIGHT ATRIUM VOLUME AREA LENGTH APICAL 4 CHAMBER: 23 ML
RIGHT VENTRICLE DIASTOLIC BASEL DIMENSION: 3.6 CM
RV TB RVSP: 5 MMHG
RV TISSUE DOPPLER FREE WALL SYSTOLIC VELOCITY 1 (APICAL 4 CHAMBER VIEW): 16.57 CM/S
SINUS: 3.48 CM
STJ: 3 CM
STRESS ECHO POST EXERCISE DUR MIN: 3 MINUTES
STRESS ECHO POST EXERCISE DUR SEC: 0 SECONDS
SYSTOLIC BLOOD PRESSURE: 122 MMHG
TDI LATERAL: 0.1 M/S
TDI SEPTAL: 0.06 M/S
TDI: 0.08 M/S
TR MAX PG: 21 MMHG
TRICUSPID ANNULAR PLANE SYSTOLIC EXCURSION: 1.75 CM
TV REST PULMONARY ARTERY PRESSURE: 24 MMHG
Z-SCORE OF LEFT VENTRICULAR DIMENSION IN END DIASTOLE: -18.7
Z-SCORE OF LEFT VENTRICULAR DIMENSION IN END SYSTOLE: -13.08

## 2025-04-09 PROCEDURE — 93016 CV STRESS TEST SUPVJ ONLY: CPT | Mod: ,,, | Performed by: INTERNAL MEDICINE

## 2025-04-09 PROCEDURE — 93018 CV STRESS TEST I&R ONLY: CPT | Mod: ,,, | Performed by: INTERNAL MEDICINE

## 2025-04-09 PROCEDURE — 93306 TTE W/DOPPLER COMPLETE: CPT

## 2025-04-09 PROCEDURE — 93017 CV STRESS TEST TRACING ONLY: CPT

## 2025-04-10 ENCOUNTER — RESULTS FOLLOW-UP (OUTPATIENT)
Dept: CARDIOLOGY | Facility: CLINIC | Age: 45
End: 2025-04-10

## 2025-04-10 NOTE — PROGRESS NOTES
Stress test and echocardiogram are unremarkable.  Okay to proceed with planned surgery    Pt has no active cardiac condition (ACS/USA, decompenstated CHF, significant arrhythmias or severe valvular disease). Patient has acceptable risk for @ risk non cardiovascular surgery. No further cardiac work up required prior to  undergoing the surgical procedure.  These recommendations follow the 2024 ACC/AHA Guideline on Perioperative Cardiovascular Evaluation and Management of Patients Undergoing Noncardiac Surgery.

## 2025-04-17 ENCOUNTER — CLINICAL SUPPORT (OUTPATIENT)
Dept: BARIATRICS | Facility: CLINIC | Age: 45
End: 2025-04-17
Payer: COMMERCIAL

## 2025-04-17 DIAGNOSIS — Z71.3 DIETARY COUNSELING AND SURVEILLANCE: Primary | ICD-10-CM

## 2025-04-17 DIAGNOSIS — I10 HYPERTENSION, UNSPECIFIED TYPE: ICD-10-CM

## 2025-04-17 DIAGNOSIS — E66.01 MORBID OBESITY WITH BMI OF 50.0-59.9, ADULT: ICD-10-CM

## 2025-04-17 NOTE — PROGRESS NOTES
Audio Only Telehealth Visit     The patient location is: [place]  The chief complaint leading to consultation is: Pre-op liquid diet and nutrition instructions  Visit type: Virtual visit with audio only (telephone)  Total time spent with patient: 15 mins     The reason for the audio only service rather than synchronous audio and video virtual visit was related to technical difficulties or patient preference/necessity.     Each patient to whom I provide medical services by telemedicine is:  (1) informed of the relationship between the physician and patient and the respective role of any other health care provider with respect to management of the patient; and (2) notified that they may decline to receive medical services by telemedicine and may withdraw from such care at any time. Patient verbally consented to receive this service via voice-only telephone call.    This service was not originating from a related E/M service provided within the previous 7 days nor will  to an E/M service or procedure within the next 24 hours or my soonest available appointment.  Prevailing standard of care was able to be met in this audio-only visit.      NUTRITION NOTE    Bariatric Surgeon: Arnel Ang M.D.  Reason for MNT Referral: Pre-op liquid diet and nutrition instructions  Procedure: Francisco-en-Y gastric bypass  Date of Surgery: 5/1/25    Pre-op liquid diet  Pt using Premier Premier for preop liquid diet  Fluids include: water, broth, SF Jell-O    Discussion:  -  gms of protein per day  - 600-800 calories per day   - No more than 4 g sugar per protein shake/water/powder  - Sugar-free, decaffeinated, non-carbonated fluids  - No fruits, juices, yogurt or pudding on liquid diet  - No herbal supplements including green tea and fish oils for 2 weeks prior to surgery   - No vitamins for 1 week prior to surgery  - Stop GLP-1 1 week prior to surgery    Post-op instructions  - Reviewed nutritional guidelines for post-surgery.     - 1 ounce medicine cups and tracking sheet provided for patient to measure and track fluid intake after surgery.  - Start with 1 oz clear liquids every 15 minutes following surgery, and to increase as tolerated. Aim for 48 fl oz clear fluids daily (includes clear protein drinks and protein soups).  - May begin sipping on protein shakes, as long as maintaining 48 fl oz non-caffeinated clear liquids per day.  - Reviewed bariatric vitamin/mineral regimen, starts 1 week after surgery as tolerated.  - Reviewed common nutritional concerns and prevention tips after bariatric surgery.  - Reminded not to lift anything greater than 10 lbs for 6 week post-surgery   - Encouraged PT to walk as much as tolerable after surgery.     Pt has the following vitamins and minerals to start taking one week after being discharged from hospital:    Barimelt Multivitamin with 18 mg iron  Celebrate Calcium Citrate chews 500 mg with vitamin D   B-1 250 mg  B-12 2500 mcg sublingual      Reviewed dosage and timing of vitamin/mineral guidelines.    Remind pt per nursing and medical team to inform our department if taking antibiotics within the 30 days post bariatric surgery or it any other surgeries/procedures are scheduled within 30 days after bariatric surgery.    Pt verbalized understanding of information provided with appropriate questions and comments.     SESSION TIME: 15 minutes

## 2025-04-24 ENCOUNTER — TELEPHONE (OUTPATIENT)
Dept: BARIATRICS | Facility: CLINIC | Age: 45
End: 2025-04-24
Payer: COMMERCIAL

## 2025-04-24 NOTE — TELEPHONE ENCOUNTER
Ask pt to verify the spelling of their name,,surgeon,arrival date,preop date, date of surgery,type of surgery,post op appointment dates,and departure date. We discussed pre op teaching,we reviewed pages 1-5 in  the [facility] [place] [facility] booklet.I notified them that I will give them the appointment reminders on their preop day. I discussed the liquid diet they have been doing for a week now and asked about constipation.I instructed them to start taking Miralax daily until surgery. I went over the list of important things that they need to bring with them for their stay. I instructed them on what to stop taking 7 days before surgery and made sure they had stopped taking any herbal supplements and green tea. I told them to start cleaning their belly button with oil and a q-tip and follow it with the hibiclens that they need to bring with them. Pt verbalized that they had a chance to review the book ahead of time. After reviewing the booklet together, all questions and concerns were addressed and answered. Patient verbalized understanding of the above information and was instructed to contact me with any further questions or concerns from now until the preop appointment.

## 2025-04-29 DIAGNOSIS — A04.8 H. PYLORI INFECTION: Primary | ICD-10-CM

## 2025-04-30 ENCOUNTER — TELEPHONE (OUTPATIENT)
Dept: BARIATRICS | Facility: CLINIC | Age: 45
End: 2025-04-30
Payer: COMMERCIAL

## 2025-04-30 ENCOUNTER — OFFICE VISIT (OUTPATIENT)
Dept: BARIATRICS | Facility: CLINIC | Age: 45
End: 2025-04-30
Payer: COMMERCIAL

## 2025-04-30 ENCOUNTER — ANESTHESIA EVENT (OUTPATIENT)
Dept: SURGERY | Facility: HOSPITAL | Age: 45
End: 2025-04-30
Payer: COMMERCIAL

## 2025-04-30 VITALS
HEIGHT: 69 IN | HEART RATE: 98 BPM | DIASTOLIC BLOOD PRESSURE: 70 MMHG | BODY MASS INDEX: 43.4 KG/M2 | WEIGHT: 293 LBS | SYSTOLIC BLOOD PRESSURE: 126 MMHG | OXYGEN SATURATION: 99 %

## 2025-04-30 DIAGNOSIS — E66.01 MORBID OBESITY WITH BMI OF 50.0-59.9, ADULT: Primary | ICD-10-CM

## 2025-04-30 DIAGNOSIS — I10 PRIMARY HYPERTENSION: ICD-10-CM

## 2025-04-30 PROCEDURE — 99999 PR PBB SHADOW E&M-EST. PATIENT-LVL III: CPT | Mod: PBBFAC,COE,, | Performed by: SURGERY

## 2025-04-30 PROCEDURE — 3074F SYST BP LT 130 MM HG: CPT | Mod: CPTII,S$GLB,, | Performed by: SURGERY

## 2025-04-30 PROCEDURE — 99213 OFFICE O/P EST LOW 20 MIN: CPT | Mod: S$GLB,,, | Performed by: SURGERY

## 2025-04-30 PROCEDURE — 3008F BODY MASS INDEX DOCD: CPT | Mod: CPTII,S$GLB,, | Performed by: SURGERY

## 2025-04-30 PROCEDURE — 3044F HG A1C LEVEL LT 7.0%: CPT | Mod: CPTII,S$GLB,, | Performed by: SURGERY

## 2025-04-30 PROCEDURE — 3078F DIAST BP <80 MM HG: CPT | Mod: CPTII,S$GLB,, | Performed by: SURGERY

## 2025-04-30 PROCEDURE — 1159F MED LIST DOCD IN RCRD: CPT | Mod: CPTII,S$GLB,, | Performed by: SURGERY

## 2025-04-30 RX ORDER — POLYETHYLENE GLYCOL 3350 17 G/17G
17 POWDER, FOR SOLUTION ORAL DAILY
Qty: 510 G | Refills: 0 | Status: SHIPPED | OUTPATIENT
Start: 2025-04-30 | End: 2025-06-01

## 2025-04-30 RX ORDER — SODIUM CHLORIDE 9 MG/ML
INJECTION, SOLUTION INTRAVENOUS CONTINUOUS
Status: CANCELLED | OUTPATIENT
Start: 2025-04-30

## 2025-04-30 RX ORDER — OMEPRAZOLE 40 MG/1
40 CAPSULE, DELAYED RELEASE ORAL EVERY MORNING
Qty: 30 CAPSULE | Refills: 2 | Status: SHIPPED | OUTPATIENT
Start: 2025-04-30

## 2025-04-30 RX ORDER — DEXTROMETHORPHAN/PSEUDOEPHED 2.5-7.5/.8
40 DROPS ORAL 4 TIMES DAILY PRN
Status: CANCELLED | OUTPATIENT
Start: 2025-04-30

## 2025-04-30 RX ORDER — FAMOTIDINE 10 MG/ML
20 INJECTION, SOLUTION INTRAVENOUS ONCE
Status: CANCELLED | OUTPATIENT
Start: 2025-04-30 | End: 2025-04-30

## 2025-04-30 RX ORDER — OXYCODONE HCL 5 MG/5 ML
5 SOLUTION, ORAL ORAL EVERY 6 HOURS PRN
Refills: 0 | Status: CANCELLED | OUTPATIENT
Start: 2025-04-30

## 2025-04-30 RX ORDER — GABAPENTIN 250 MG/5ML
300 SOLUTION ORAL 2 TIMES DAILY
Status: CANCELLED | OUTPATIENT
Start: 2025-04-30

## 2025-04-30 RX ORDER — ACETAMINOPHEN 10 MG/ML
1000 INJECTION, SOLUTION INTRAVENOUS ONCE
Status: CANCELLED | OUTPATIENT
Start: 2025-04-30 | End: 2025-04-30

## 2025-04-30 RX ORDER — PANTOPRAZOLE SODIUM 40 MG/10ML
40 INJECTION, POWDER, LYOPHILIZED, FOR SOLUTION INTRAVENOUS 2 TIMES DAILY
Status: CANCELLED | OUTPATIENT
Start: 2025-04-30

## 2025-04-30 RX ORDER — SCOPOLAMINE 1 MG/3D
1 PATCH, EXTENDED RELEASE TRANSDERMAL
OUTPATIENT
Start: 2025-05-01 | End: 2025-05-01

## 2025-04-30 RX ORDER — ACETAMINOPHEN 650 MG/20.3ML
500 LIQUID ORAL
Status: CANCELLED | OUTPATIENT
Start: 2025-04-30

## 2025-04-30 RX ORDER — GABAPENTIN 300 MG/1
CAPSULE ORAL
Qty: 11 CAPSULE | Refills: 0 | Status: SHIPPED | OUTPATIENT
Start: 2025-04-30

## 2025-04-30 RX ORDER — LIDOCAINE HYDROCHLORIDE 10 MG/ML
1 INJECTION, SOLUTION EPIDURAL; INFILTRATION; INTRACAUDAL; PERINEURAL ONCE
Status: CANCELLED | OUTPATIENT
Start: 2025-04-30 | End: 2025-04-30

## 2025-04-30 RX ORDER — METRONIDAZOLE 500 MG/100ML
500 INJECTION, SOLUTION INTRAVENOUS
Status: CANCELLED | OUTPATIENT
Start: 2025-04-30

## 2025-04-30 RX ORDER — SODIUM CHLORIDE, SODIUM LACTATE, POTASSIUM CHLORIDE, CALCIUM CHLORIDE 600; 310; 30; 20 MG/100ML; MG/100ML; MG/100ML; MG/100ML
INJECTION, SOLUTION INTRAVENOUS CONTINUOUS
Status: CANCELLED | OUTPATIENT
Start: 2025-04-30

## 2025-04-30 RX ORDER — ENOXAPARIN SODIUM 100 MG/ML
60 INJECTION SUBCUTANEOUS EVERY 12 HOURS
Status: CANCELLED | OUTPATIENT
Start: 2025-04-30

## 2025-04-30 RX ORDER — PROMETHAZINE HYDROCHLORIDE 12.5 MG/1
12.5 SUPPOSITORY RECTAL EVERY 6 HOURS PRN
Qty: 5 SUPPOSITORY | Refills: 0 | Status: SHIPPED | OUTPATIENT
Start: 2025-04-30

## 2025-04-30 RX ORDER — MUPIROCIN 20 MG/G
OINTMENT TOPICAL
Status: CANCELLED | OUTPATIENT
Start: 2025-04-30

## 2025-04-30 RX ORDER — ONDANSETRON HYDROCHLORIDE 2 MG/ML
8 INJECTION, SOLUTION INTRAVENOUS EVERY 6 HOURS
Status: CANCELLED | OUTPATIENT
Start: 2025-04-30

## 2025-04-30 RX ORDER — URSODIOL 500 MG/1
500 TABLET, FILM COATED ORAL DAILY
Qty: 180 TABLET | Refills: 0 | Status: SHIPPED | OUTPATIENT
Start: 2025-04-30 | End: 2025-10-27

## 2025-04-30 RX ORDER — ONDANSETRON 8 MG/1
8 TABLET, ORALLY DISINTEGRATING ORAL EVERY 6 HOURS PRN
Qty: 30 TABLET | Refills: 0 | Status: SHIPPED | OUTPATIENT
Start: 2025-04-30

## 2025-04-30 RX ORDER — KETOROLAC TROMETHAMINE 15 MG/ML
15 INJECTION, SOLUTION INTRAMUSCULAR; INTRAVENOUS ONCE
Status: CANCELLED | OUTPATIENT
Start: 2025-04-30 | End: 2025-04-30

## 2025-04-30 RX ORDER — ACETAMINOPHEN 650 MG/20.3ML
1000 LIQUID ORAL EVERY 8 HOURS
Status: CANCELLED | OUTPATIENT
Start: 2025-04-30

## 2025-04-30 RX ORDER — SCOPOLAMINE 1 MG/3D
1 PATCH, EXTENDED RELEASE TRANSDERMAL ONCE
Status: CANCELLED | OUTPATIENT
Start: 2025-04-30 | End: 2025-04-30

## 2025-04-30 RX ORDER — PROCHLORPERAZINE EDISYLATE 5 MG/ML
5 INJECTION INTRAMUSCULAR; INTRAVENOUS EVERY 6 HOURS PRN
Status: CANCELLED | OUTPATIENT
Start: 2025-04-30

## 2025-04-30 RX ORDER — HEPARIN SODIUM 5000 [USP'U]/ML
5000 INJECTION, SOLUTION INTRAVENOUS; SUBCUTANEOUS ONCE
Status: CANCELLED | OUTPATIENT
Start: 2025-04-30 | End: 2025-04-30

## 2025-04-30 NOTE — TELEPHONE ENCOUNTER
We discussed in person at preop  pages 6-23 of the Obesity Wellness and Surgical Weight Loss booklet, paying particular attention to the highlighted areas. We went over the new handout regarding the new policy for the opioid crisis and the new protocol we put into effect involving the new way to prescribe pain medicine post operatively. I instructed them to  their preop medicine along with the pre surgery drink they need to get in the pharmacy after their appointment. I walked them thru their day of surgery as if I was the patient explaining it to them.I instructed them on the water protocol they will start to follow once in their room after PACU and once reaching 480z they would then start their protien shakes/water. We went over the goals of where they need to be at week one and two with the amount of protein and water each day they need to take in and why. I told them no antibiotics or procedures for 1 month. However, they can have a dental cleaning. We discussed the new pain med protocol, the meds they would be d/c on and the rule of anything smaller than the tip of an eraser will need to be crushed for 2 weeks after surgery. I encouraged them to take use of the program of their medicines being delivered at their bedside before d/c. I also warned them that the hospital could be full so they may stay in PACU for longer than 1-2 hours.I told them that they would be d/c in between lunch and dinner the next day after surgery. We reviewed the surgery center location,important numbers their family members need to have incase of an emergency. I Instructed pt and/or family member to call 911 in case of an emergency while at the [facility]. I got the contact phone numbers needed. We talked about caring for their abdominal wounds,preventing blood clots, notifying me of s/s of infection, SOB or difficulty breathing, chest pain, vomiting up or pooping blood or anything out of the ordinary, and lastly starting their  vitamins 1 week post op and bringing their  vitamins with them for their post op diet appointment. I gave them their post op appointment reminders, little medicine cups,and the pcp agreement to bring with them to each post op appointment with their PCP. I told them to make sure we get the ov notes and labs from each post op visit with their PCP. After reviewing the booklet together, all questions and concerns were addressed and answered. Please see my preop call note that I charted for sgy time, arrival time at the [facility], and pre sgy drink/med time. Patient verbalized understanding of the above information  and was instructed to contact me with any further questions or concerns before surgery.      Notified pt of arrival time of 445 and to meet at the [facility] on the first floor, expected start time of surgery is 700 on 5/1/2025. Instructed pt of pre op instructions at pre op visit on 4/30/2025. NPO after midnight, including no gum, candy or mints. Allowed a small sip of water to take approved medications. Anesthesia to call pt to advise on medications to take. Hibiclens shower day before and morning of surgery.  Reminded pt to drink pre surgery drink and take the liquid gabapentin that was ordered at 415.   Instructed pt on S/S of dehydration and infection, advised to not pick at susrgical sites. Pt understands to call with any concerns or questions and call 911 for emergencies, such as SOB, chest pains, blood in stool, and vomiting of blood.   Instructed pt we will have a visit two days after surgery in my office.  Appointment reminders given for 1 week post op which is scheduled. Educated pt on protein/water requirements of 48-64 oz water and 30-40G protein by end of week one. For week two,  66 oz water and 60-90G protein is goal.   Instructed pt to bring vitamins with them for their 1 week dietary appointment. Medicine cups given. Reminded pt to not take antiobiotics for 30 days following surgery and to not  schedule any elective procedures w/I 30 days of surgery unless cleared by Bariatric team first.   No shower for 48 hours post op. Shower allowed on Saturday ____5/3/2025______. You are scheduled on _5/15_____ for your 2 week post op with your PCP. After this appt, you can take a tub bath and start the Urosodil.   Pt given office number for any questions/concerns. Pt has my cell and office number. Pt verbalized an understanding to all instructions. Meds were discussed with Dr. Salmeron on what to take and what to hold.

## 2025-04-30 NOTE — PROGRESS NOTES
Subjective:  The patient is a 44 y.o. obese female who presents for pre op for gastric bypass surgery.  She has multiple associated comorbidities including essential hypertension.  Her highest adult weight was 397 lbs at age 44, and her lowest adult weight was 250lbs at age 39ish.  The patient has tried phentermine (Adipex-P) and Wegovy .  The patient was most successful with Adipex with a weight loss of 30lbs.  Her current exercise includes walking 2 times a week. She denies any history of eating disorder such as anorexia, bulimia, or taking laxatives for weight loss, and denies any addiction including illicit substances, alcohol, or gambling.  Patient states she has a good  support system.  She is currently employed in customer service .  She  denies a history of GERD.  The patient's goal is improve health.  All workup has been reviewed in clinic today and there is nothing on the review that would prevent us from proceeding with surgery.  All questions were answered in clinic today prior to leaving.  Body mass index is 56.39 kg/m².       Patient Active Problem List    Diagnosis Date Noted    Childhood asthma 2025    H/O  section 2025    BMI 50.0-59.9, adult 2025    Arthritis 2025    Vitamin D deficiency 2025    Elevated liver enzymes 2025    Prediabetes 2025    Vocal cord paresis 2020    Esophageal dysmotility 2020    Cyst of left ovary 2020    Endometrial polyp 2020    Uterine leiomyoma 2020    HTN (hypertension) 2020    Gastroesophageal reflux disease with esophagitis 2020    Seasonal allergies 2020     Past Medical History:   Diagnosis Date    Asthma     GERD (gastroesophageal reflux disease)     GERD with esophagitis     Hyperlipidemia     Hypertension     Left ovarian cyst       Past Surgical History:   Procedure Laterality Date    c sections      endometrial polypectomy      ESOPHAGOGASTRODUODENOSCOPY N/A  "2020    Procedure: EGD (ESOPHAGOGASTRODUODENOSCOPY);  Surgeon: Dom Mckeon MD;  Location: [facility];  Service: Endoscopy;  Laterality: N/A;    TUBAL LIGATION        Prescriptions Prior to Admission[1]  Review of patient's allergies indicates:   Allergen Reactions    Penicillins Hives      Social History     Tobacco Use    Smoking status: Former     Current packs/day: 0.00     Types: Cigarettes     Quit date: 2020     Years since quittin.8    Smokeless tobacco: Never   Substance Use Topics    Alcohol use: Yes     Comment: Few times a year      Family History   Problem Relation Name Age of Onset    Diabetes Father      Hypertension Father      Hypertension Mother          Review of Systems  Constitutional: negative for anorexia, chills and fatigue  Eyes: negative for icterus, irritation and redness  Respiratory: negative for cough and dyspnea on exertion  Cardiovascular: negative for chest pain and chest pressure/discomfort  Gastrointestinal: negative for abdominal pain, change in bowel habits, constipation and diarrhea  Musculoskeletal:negative for arthralgias and back pain  Neurological: negative for coordination problems and dizziness  Behavioral/Psych: negative for anxiety and bad mood    Objective:  Vital signs in last 24 hours:  Vitals:    25 1406   BP: 126/70   BP Location: Right arm   Patient Position: Sitting   Pulse: 98   SpO2: 99%   Weight: (!) 173.2 kg (381 lb 13.4 oz)   Height: 5' 9" (1.753 m)               General appearance: alert, appears stated age and cooperative  Head: Normocephalic, without obvious abnormality, atraumatic  Eyes: negative findings: lids and lashes normal and conjunctivae and sclerae normal  Lungs: non labored breathing  Heart: regular rate and rhythm  Abdomen: soft, non-tender  Extremities: extremities normal, atraumatic, no cyanosis or edema  Skin: Skin color, texture, turgor normal. No rashes or lesions  Neurologic: Grossly normal    Data " Review:  Reviewed    Assessment/Plan:  Morbid obesity with failure of conservative therapy.    The patient was informed that risks include, but are not limited to: death, leak, obstruction, bleeding, and sepsis. Any of these could require further surgery. Other risks include DVT, PE, pneumonia, wound dehiscence, hernia, wound infection, the need for dilatations and the inability to lose appropriate weight and keep it off.     We discussed that our goal is to ameliorate her medical problems and not to obtain a specific body mass index. She understands the risks and benefits and wishes to proceed with the procedure. She has signed a consent form.       Arnel Ang MD             [1] (Not in a hospital admission)

## 2025-04-30 NOTE — ANESTHESIA PREPROCEDURE EVALUATION
[facility]  Anesthesia Pre-Operative Evaluation   04/30/2025        Patient Name: Elva Caceres  YOB: 1980  MRN: [id number]    Subjective  Pre-operative evaluation for Procedure(s) (LRB):  GASTROENTEROSTOMY, LAPAROSCOPIC W/ INTRA OP EGD +/-HHR (N/A)    Elva Caceres is a 44 y.o. female w/ a significant PMHx of: asthma, GERD, HTN, morbid obesity now planning for above.     TTE     Left Ventricle: The left ventricle is normal in size. Normal wall thickness. There is normal systolic function. Quantitated ejection fraction is 63%. There is normal diastolic function.    Right Ventricle: The right ventricle is normal in size. Wall thickness is normal. Systolic function is normal.    Pulmonary Artery: The estimated pulmonary artery systolic pressure is 24 mmHg.    IVC/SVC: Normal venous pressure at 3 mmHg    Stress EKG       The ECG portion of the study is negative for ischemia.    The patient reported no chest pain during the stress test.    During stress, rare PVCs are noted.    The exercise capacity was below average.    The patient exercised for 3 minutes 0 seconds on a Roel protocol, achieving a peak heart rate of 171 bpm, which is 102% of the age predicted maximum heart rate. The patient experienced no angina during the test. Their exercise capacity was below average.        Current Inpatient Medications:       Medications Ordered Prior to Encounter[1]    Past Surgical History:   Procedure Laterality Date    c sections      endometrial polypectomy      ESOPHAGOGASTRODUODENOSCOPY N/A 09/21/2020    Procedure: EGD (ESOPHAGOGASTRODUODENOSCOPY);  Surgeon: Dom Mckeon MD;  Location: [facility];  Service: Endoscopy;  Laterality: N/A;    TUBAL LIGATION         Social History:  Tobacco Use: Medium Risk (4/2/2025)    Patient History     Smoking Tobacco Use: Former     Smokeless Tobacco Use: Never     Passive Exposure: Not on file       Alcohol Use: Not At Risk (3/5/2025)    AUDIT-C      Frequency of Alcohol Consumption: Monthly or less     Average Number of Drinks: 3 or 4     Frequency of Binge Drinking: Never       Objective    Vital Signs Range:  BMI Readings from Last 1 Encounters:   04/09/25 58.63 kg/m²               Significant Labs:        Component Value Date/Time    WBC 4.71 03/10/2025 1354    HGB 12.8 03/10/2025 1354    HCT 40.5 03/10/2025 1354     03/10/2025 1354     03/10/2025 1354    K 3.5 03/10/2025 1354     03/10/2025 1354    CO2 24 03/10/2025 1354    GLU 78 03/10/2025 1354    BUN 12 03/10/2025 1354    CREATININE 0.7 03/10/2025 1354    MG 1.8 03/10/2025 1354    PHOS 2.9 03/10/2025 1354    CALCIUM 9.2 03/10/2025 1354    ALBUMIN 3.5 03/10/2025 1354    PROT 9.0 (H) 03/10/2025 1354    ALKPHOS 76 03/10/2025 1354    BILITOT 0.5 03/10/2025 1354    AST 42 (H) 03/10/2025 1354    ALT 26 03/10/2025 1354    INR 1.4 (H) 10/01/2020 1118    HGBA1C 5.7 (H) 03/10/2025 1354        Please see Results Review for additional labs.     Diagnostic Studies: All relevant studies, reviewed.    EKG:   Results for orders placed or performed in visit on 03/13/25   IN OFFICE EKG 12-LEAD (to [place])    Collection Time: 03/13/25  1:11 PM   Result Value Ref Range    QRS Duration 90 ms    [facility] QTC Calculation 456 ms    Narrative    Test Reason : I10,    Vent. Rate : 106 BPM     Atrial Rate : 106 BPM     P-R Int : 142 ms          QRS Dur :  90 ms      QT Int : 344 ms       P-R-T Axes :  33 -28  29 degrees    QTcB Int : 456 ms    Sinus tachycardia  Minimal voltage criteria for LVH, may be normal variant  Borderline Abnormal ECG  No previous ECGs available  Confirmed by Fransisco Smith ([id number]) on 3/14/2025 6:00:47 PM    Referred By: NAKIA BRUNER           Confirmed By: Fransisco Smith       ECHO:  Results for orders placed during the hospital encounter of 04/09/25    Echo    Interpretation Summary    Left Ventricle: The left ventricle is normal in size. Normal wall thickness. There is normal systolic  function. Quantitated ejection fraction is 63%. There is normal diastolic function.    Right Ventricle: The right ventricle is normal in size. Wall thickness is normal. Systolic function is normal.    Pulmonary Artery: The estimated pulmonary artery systolic pressure is 24 mmHg.    IVC/SVC: Normal venous pressure at 3 mmHg.        Pre-op Assessment    I have reviewed the NPO Status.   I have reviewed the Medications.     Review of Systems  Anesthesia Hx:  No problems with previous Anesthesia               Denies Personal Hx of Anesthesia complications.                    Social:  Non-Smoker, No Alcohol Use       Cardiovascular:     Hypertension       Denies Angina.                                    Pulmonary:    Asthma   Denies Shortness of breath.                  Renal/:   Denies Chronic Renal Disease.                Hepatic/GI:     GERD                Neurological:    Denies CVA.                                    Endocrine:        Morbid Obesity / BMI > 40      Physical Exam  General: Well nourished, Cooperative, Alert and Oriented    Airway:  Mallampati: II   Mouth Opening: Normal  TM Distance: Normal  Tongue: Normal  Neck ROM: Normal ROM    Dental:  Intact        Anesthesia Plan  Type of Anesthesia, risks & benefits discussed:    Anesthesia Type: Gen ETT, Regional  Intra-op Monitoring Plan: Standard ASA Monitors  Post Op Pain Control Plan: multimodal analgesia and IV/PO Opioids PRN  Induction:  IV  Airway Plan: Direct and Video, Post-Induction with ramp  Informed Consent: Informed consent signed with the Patient and all parties understand the risks and agree with anesthesia plan.  All questions answered.   ASA Score: 3  Day of Surgery Review of History & Physical: H&P Update referred to the surgeon/provider.    Ready For Surgery From Anesthesia Perspective.     .           [1]   No current facility-administered medications on file prior to encounter.     Current Outpatient Medications on File Prior to  Encounter   Medication Sig Dispense Refill    acetaminophen (TYLENOL) 325 MG tablet Take 1 tablet (325 mg total) by mouth every 6 (six) hours as needed for Pain. 30 tablet 1    amLODIPine (NORVASC) 10 MG tablet Take 10 mg by mouth once daily.      BREO ELLIPTA 100-25 mcg/dose diskus inhaler       diclofenac sodium (VOLTAREN) 1 % Gel Apply 2 g topically 4 (four) times daily. 1 Tube 5    ergocalciferol (ERGOCALCIFEROL) 50,000 unit Cap Take 50,000 Units by mouth twice a week.      fluticasone propionate (FLONASE) 50 mcg/actuation nasal spray 1 spray by Each Nostril route daily as needed. (Patient not taking: Reported on 4/2/2025)      naproxen sodium (ALEVE) 220 MG tablet Take 220 mg by mouth as needed. pain      norgestimate-ethinyl estradioL (ORTHO-CYCLEN) 0.25-35 mg-mcg per tablet Take 1 tablet by mouth once daily. 30 tablet 11    triamterene-hydrochlorothiazide 37.5-25 mg (DYAZIDE) 37.5-25 mg per capsule TAKE ONE CAPSULE BY MOUTH IN THE MORNING 30 capsule 0

## 2025-05-01 ENCOUNTER — ANESTHESIA (OUTPATIENT)
Dept: SURGERY | Facility: HOSPITAL | Age: 45
End: 2025-05-01
Payer: COMMERCIAL

## 2025-05-01 ENCOUNTER — HOSPITAL ENCOUNTER (INPATIENT)
Facility: HOSPITAL | Age: 45
LOS: 1 days | Discharge: HOME | DRG: 621 | End: 2025-05-02
Attending: SURGERY | Admitting: SURGERY
Payer: COMMERCIAL

## 2025-05-01 DIAGNOSIS — E66.01 MORBID OBESITY: ICD-10-CM

## 2025-05-01 DIAGNOSIS — K21.00 GASTROESOPHAGEAL REFLUX DISEASE WITH ESOPHAGITIS, UNSPECIFIED WHETHER HEMORRHAGE: ICD-10-CM

## 2025-05-01 DIAGNOSIS — I10 HYPERTENSION, UNSPECIFIED TYPE: ICD-10-CM

## 2025-05-01 DIAGNOSIS — E66.01 MORBID OBESITY WITH BMI OF 50.0-59.9, ADULT: ICD-10-CM

## 2025-05-01 DIAGNOSIS — A04.8 H. PYLORI INFECTION: ICD-10-CM

## 2025-05-01 PROBLEM — E66.9 OBESITY: Status: ACTIVE | Noted: 2025-05-01

## 2025-05-01 LAB
B-HCG UR QL: NEGATIVE
CTP QC/QA: YES

## 2025-05-01 PROCEDURE — 76942 ECHO GUIDE FOR BIOPSY: CPT

## 2025-05-01 PROCEDURE — 71000033 HC RECOVERY, INTIAL HOUR: Performed by: SURGERY

## 2025-05-01 PROCEDURE — 87338 HPYLORI STOOL AG IA: CPT | Mod: QW,COE,, | Performed by: SURGERY

## 2025-05-01 PROCEDURE — 63600175 PHARM REV CODE 636 W HCPCS

## 2025-05-01 PROCEDURE — 25000003 PHARM REV CODE 250

## 2025-05-01 PROCEDURE — 63600175 PHARM REV CODE 636 W HCPCS: Performed by: SURGERY

## 2025-05-01 PROCEDURE — 36000710: Performed by: SURGERY

## 2025-05-01 PROCEDURE — 25000242 PHARM REV CODE 250 ALT 637 W/ HCPCS: Performed by: SURGERY

## 2025-05-01 PROCEDURE — 63600175 PHARM REV CODE 636 W HCPCS: Performed by: ANESTHESIOLOGY

## 2025-05-01 PROCEDURE — 0D164ZA BYPASS STOMACH TO JEJUNUM, PERCUTANEOUS ENDOSCOPIC APPROACH: ICD-10-PCS | Performed by: SURGERY

## 2025-05-01 PROCEDURE — 71000016 HC POSTOP RECOV ADDL HR: Performed by: SURGERY

## 2025-05-01 PROCEDURE — 0DJ08ZZ INSPECTION OF UPPER INTESTINAL TRACT, VIA NATURAL OR ARTIFICIAL OPENING ENDOSCOPIC: ICD-10-PCS | Performed by: SURGERY

## 2025-05-01 PROCEDURE — 37000008 HC ANESTHESIA 1ST 15 MINUTES: Performed by: SURGERY

## 2025-05-01 PROCEDURE — 11000001 HC ACUTE MED/SURG PRIVATE ROOM

## 2025-05-01 PROCEDURE — 81025 URINE PREGNANCY TEST: CPT | Performed by: SURGERY

## 2025-05-01 PROCEDURE — 0FB03ZX EXCISION OF LIVER, PERCUTANEOUS APPROACH, DIAGNOSTIC: ICD-10-PCS | Performed by: SURGERY

## 2025-05-01 PROCEDURE — 71000015 HC POSTOP RECOV 1ST HR: Performed by: SURGERY

## 2025-05-01 PROCEDURE — 36000711: Performed by: SURGERY

## 2025-05-01 PROCEDURE — C1781 MESH (IMPLANTABLE): HCPCS | Performed by: SURGERY

## 2025-05-01 PROCEDURE — 88312 SPECIAL STAINS GROUP 1: CPT | Mod: TC | Performed by: SURGERY

## 2025-05-01 PROCEDURE — 37000009 HC ANESTHESIA EA ADD 15 MINS: Performed by: SURGERY

## 2025-05-01 PROCEDURE — 27201423 OPTIME MED/SURG SUP & DEVICES STERILE SUPPLY: Performed by: SURGERY

## 2025-05-01 PROCEDURE — 25000003 PHARM REV CODE 250: Performed by: SURGERY

## 2025-05-01 DEVICE — SEAMGUARD ESCHELON 60 MM.: Type: IMPLANTABLE DEVICE | Site: ABDOMEN | Status: FUNCTIONAL

## 2025-05-01 RX ORDER — PROPOFOL 10 MG/ML
VIAL (ML) INTRAVENOUS
Status: DISCONTINUED | OUTPATIENT
Start: 2025-05-01 | End: 2025-05-01

## 2025-05-01 RX ORDER — SCOPOLAMINE 1 MG/3D
1 PATCH, EXTENDED RELEASE TRANSDERMAL ONCE
Status: DISCONTINUED | OUTPATIENT
Start: 2025-05-01 | End: 2025-05-02 | Stop reason: HOSPADM

## 2025-05-01 RX ORDER — AMLODIPINE BESYLATE 5 MG/1
10 TABLET ORAL DAILY
Status: DISCONTINUED | OUTPATIENT
Start: 2025-05-02 | End: 2025-05-02 | Stop reason: HOSPADM

## 2025-05-01 RX ORDER — HALOPERIDOL LACTATE 5 MG/ML
0.5 INJECTION, SOLUTION INTRAMUSCULAR EVERY 10 MIN PRN
Status: COMPLETED | OUTPATIENT
Start: 2025-05-01 | End: 2025-05-01

## 2025-05-01 RX ORDER — LIDOCAINE HYDROCHLORIDE 20 MG/ML
INJECTION, SOLUTION EPIDURAL; INFILTRATION; INTRACAUDAL; PERINEURAL
Status: DISCONTINUED | OUTPATIENT
Start: 2025-05-01 | End: 2025-05-01

## 2025-05-01 RX ORDER — METRONIDAZOLE 500 MG/100ML
500 INJECTION, SOLUTION INTRAVENOUS
Status: COMPLETED | OUTPATIENT
Start: 2025-05-01 | End: 2025-05-01

## 2025-05-01 RX ORDER — FAMOTIDINE 10 MG/ML
20 INJECTION, SOLUTION INTRAVENOUS ONCE
Status: COMPLETED | OUTPATIENT
Start: 2025-05-01 | End: 2025-05-01

## 2025-05-01 RX ORDER — SODIUM CHLORIDE 0.9 % (FLUSH) 0.9 %
10 SYRINGE (ML) INJECTION
Status: DISCONTINUED | OUTPATIENT
Start: 2025-05-01 | End: 2025-05-01 | Stop reason: HOSPADM

## 2025-05-01 RX ORDER — BUPIVACAINE HYDROCHLORIDE 7.5 MG/ML
INJECTION, SOLUTION EPIDURAL; RETROBULBAR
Status: COMPLETED | OUTPATIENT
Start: 2025-05-01 | End: 2025-05-01

## 2025-05-01 RX ORDER — FENTANYL CITRATE 50 UG/ML
25-200 INJECTION, SOLUTION INTRAMUSCULAR; INTRAVENOUS
Status: DISCONTINUED | OUTPATIENT
Start: 2025-05-01 | End: 2025-05-01 | Stop reason: HOSPADM

## 2025-05-01 RX ORDER — PANTOPRAZOLE SODIUM 40 MG/10ML
40 INJECTION, POWDER, LYOPHILIZED, FOR SOLUTION INTRAVENOUS 2 TIMES DAILY
Status: DISCONTINUED | OUTPATIENT
Start: 2025-05-01 | End: 2025-05-02 | Stop reason: HOSPADM

## 2025-05-01 RX ORDER — MIDAZOLAM HYDROCHLORIDE 1 MG/ML
.5-4 INJECTION, SOLUTION INTRAMUSCULAR; INTRAVENOUS
Status: DISCONTINUED | OUTPATIENT
Start: 2025-05-01 | End: 2025-05-01 | Stop reason: HOSPADM

## 2025-05-01 RX ORDER — KETOROLAC TROMETHAMINE 15 MG/ML
15 INJECTION, SOLUTION INTRAMUSCULAR; INTRAVENOUS ONCE
Status: COMPLETED | OUTPATIENT
Start: 2025-05-01 | End: 2025-05-01

## 2025-05-01 RX ORDER — LIDOCAINE HYDROCHLORIDE 10 MG/ML
1 INJECTION, SOLUTION EPIDURAL; INFILTRATION; INTRACAUDAL; PERINEURAL ONCE
Status: DISCONTINUED | OUTPATIENT
Start: 2025-05-01 | End: 2025-05-01 | Stop reason: HOSPADM

## 2025-05-01 RX ORDER — PROCHLORPERAZINE EDISYLATE 5 MG/ML
5 INJECTION INTRAMUSCULAR; INTRAVENOUS EVERY 6 HOURS PRN
Status: DISCONTINUED | OUTPATIENT
Start: 2025-05-01 | End: 2025-05-02 | Stop reason: HOSPADM

## 2025-05-01 RX ORDER — ENOXAPARIN SODIUM 100 MG/ML
60 INJECTION SUBCUTANEOUS EVERY 12 HOURS
Status: DISCONTINUED | OUTPATIENT
Start: 2025-05-01 | End: 2025-05-02 | Stop reason: HOSPADM

## 2025-05-01 RX ORDER — OXYCODONE HCL 5 MG/5 ML
5 SOLUTION, ORAL ORAL EVERY 6 HOURS PRN
Status: DISCONTINUED | OUTPATIENT
Start: 2025-05-01 | End: 2025-05-02 | Stop reason: HOSPADM

## 2025-05-01 RX ORDER — ACETAMINOPHEN 650 MG/20.3ML
500 LIQUID ORAL
Status: DISCONTINUED | OUTPATIENT
Start: 2025-05-01 | End: 2025-05-02 | Stop reason: HOSPADM

## 2025-05-01 RX ORDER — SODIUM CHLORIDE 9 MG/ML
INJECTION, SOLUTION INTRAVENOUS CONTINUOUS
Status: DISCONTINUED | OUTPATIENT
Start: 2025-05-01 | End: 2025-05-02 | Stop reason: HOSPADM

## 2025-05-01 RX ORDER — ROCURONIUM BROMIDE 10 MG/ML
INJECTION, SOLUTION INTRAVENOUS
Status: DISCONTINUED | OUTPATIENT
Start: 2025-05-01 | End: 2025-05-01

## 2025-05-01 RX ORDER — DEXTROMETHORPHAN/PSEUDOEPHED 2.5-7.5/.8
40 DROPS ORAL 4 TIMES DAILY PRN
Status: DISCONTINUED | OUTPATIENT
Start: 2025-05-01 | End: 2025-05-02 | Stop reason: HOSPADM

## 2025-05-01 RX ORDER — ONDANSETRON HYDROCHLORIDE 2 MG/ML
8 INJECTION, SOLUTION INTRAVENOUS EVERY 6 HOURS
Status: DISCONTINUED | OUTPATIENT
Start: 2025-05-01 | End: 2025-05-02 | Stop reason: HOSPADM

## 2025-05-01 RX ORDER — ACETAMINOPHEN 650 MG/20.3ML
1000 LIQUID ORAL EVERY 8 HOURS
Status: DISCONTINUED | OUTPATIENT
Start: 2025-05-01 | End: 2025-05-02 | Stop reason: HOSPADM

## 2025-05-01 RX ORDER — CEFAZOLIN SODIUM 1 G/3ML
INJECTION, POWDER, FOR SOLUTION INTRAMUSCULAR; INTRAVENOUS
Status: DISCONTINUED | OUTPATIENT
Start: 2025-05-01 | End: 2025-05-01

## 2025-05-01 RX ORDER — ONDANSETRON HYDROCHLORIDE 2 MG/ML
INJECTION, SOLUTION INTRAVENOUS
Status: DISCONTINUED | OUTPATIENT
Start: 2025-05-01 | End: 2025-05-01

## 2025-05-01 RX ORDER — DEXMEDETOMIDINE HYDROCHLORIDE 100 UG/ML
INJECTION, SOLUTION INTRAVENOUS
Status: DISCONTINUED | OUTPATIENT
Start: 2025-05-01 | End: 2025-05-01

## 2025-05-01 RX ORDER — HEPARIN SODIUM 5000 [USP'U]/ML
5000 INJECTION, SOLUTION INTRAVENOUS; SUBCUTANEOUS ONCE
Status: COMPLETED | OUTPATIENT
Start: 2025-05-01 | End: 2025-05-01

## 2025-05-01 RX ORDER — KETAMINE HCL IN 0.9 % NACL 50 MG/5 ML
SYRINGE (ML) INTRAVENOUS
Status: DISCONTINUED | OUTPATIENT
Start: 2025-05-01 | End: 2025-05-01

## 2025-05-01 RX ORDER — GLUCAGON 1 MG
1 KIT INJECTION
Status: DISCONTINUED | OUTPATIENT
Start: 2025-05-01 | End: 2025-05-01 | Stop reason: HOSPADM

## 2025-05-01 RX ORDER — GABAPENTIN 250 MG/5ML
300 SOLUTION ORAL 2 TIMES DAILY
Status: DISCONTINUED | OUTPATIENT
Start: 2025-05-01 | End: 2025-05-02 | Stop reason: HOSPADM

## 2025-05-01 RX ORDER — DEXAMETHASONE SODIUM PHOSPHATE 4 MG/ML
INJECTION, SOLUTION INTRA-ARTICULAR; INTRALESIONAL; INTRAMUSCULAR; INTRAVENOUS; SOFT TISSUE
Status: DISCONTINUED | OUTPATIENT
Start: 2025-05-01 | End: 2025-05-01

## 2025-05-01 RX ORDER — ACETAMINOPHEN 10 MG/ML
1000 INJECTION, SOLUTION INTRAVENOUS ONCE
Status: COMPLETED | OUTPATIENT
Start: 2025-05-01 | End: 2025-05-01

## 2025-05-01 RX ORDER — SODIUM CHLORIDE, SODIUM LACTATE, POTASSIUM CHLORIDE, CALCIUM CHLORIDE 600; 310; 30; 20 MG/100ML; MG/100ML; MG/100ML; MG/100ML
INJECTION, SOLUTION INTRAVENOUS CONTINUOUS
Status: DISCONTINUED | OUTPATIENT
Start: 2025-05-01 | End: 2025-05-02 | Stop reason: HOSPADM

## 2025-05-01 RX ORDER — MUPIROCIN 20 MG/G
OINTMENT TOPICAL
Status: DISCONTINUED | OUTPATIENT
Start: 2025-05-01 | End: 2025-05-01 | Stop reason: HOSPADM

## 2025-05-01 RX ADMIN — ROCURONIUM BROMIDE 20 MG: 10 INJECTION, SOLUTION INTRAVENOUS at 08:05

## 2025-05-01 RX ADMIN — Medication 15 MG: at 07:05

## 2025-05-01 RX ADMIN — PROCHLORPERAZINE EDISYLATE 5 MG: 5 INJECTION INTRAMUSCULAR; INTRAVENOUS at 10:05

## 2025-05-01 RX ADMIN — DEXMEDETOMIDINE 8 MCG: 200 INJECTION, SOLUTION INTRAVENOUS at 09:05

## 2025-05-01 RX ADMIN — MUPIROCIN: 20 OINTMENT TOPICAL at 05:05

## 2025-05-01 RX ADMIN — ROCURONIUM BROMIDE 100 MG: 10 INJECTION, SOLUTION INTRAVENOUS at 07:05

## 2025-05-01 RX ADMIN — BUPIVACAINE HYDROCHLORIDE 30 ML: 7.5 INJECTION, SOLUTION EPIDURAL; RETROBULBAR at 06:05

## 2025-05-01 RX ADMIN — DEXAMETHASONE SODIUM PHOSPHATE 4 MG: 4 INJECTION INTRA-ARTICULAR; INTRALESIONAL; INTRAMUSCULAR; INTRAVENOUS; SOFT TISSUE at 07:05

## 2025-05-01 RX ADMIN — SODIUM CHLORIDE, POTASSIUM CHLORIDE, SODIUM LACTATE AND CALCIUM CHLORIDE: 600; 310; 30; 20 INJECTION, SOLUTION INTRAVENOUS at 04:05

## 2025-05-01 RX ADMIN — ENOXAPARIN SODIUM 60 MG: 60 INJECTION SUBCUTANEOUS at 10:05

## 2025-05-01 RX ADMIN — PANTOPRAZOLE SODIUM 40 MG: 40 INJECTION, POWDER, FOR SOLUTION INTRAVENOUS at 09:05

## 2025-05-01 RX ADMIN — HALOPERIDOL LACTATE 0.5 MG: 5 INJECTION, SOLUTION INTRAMUSCULAR at 10:05

## 2025-05-01 RX ADMIN — ACETAMINOPHEN 999.01 MG: 650 SOLUTION ORAL at 03:05

## 2025-05-01 RX ADMIN — SODIUM CHLORIDE, POTASSIUM CHLORIDE, SODIUM LACTATE AND CALCIUM CHLORIDE: 600; 310; 30; 20 INJECTION, SOLUTION INTRAVENOUS at 09:05

## 2025-05-01 RX ADMIN — ONDANSETRON 8 MG: 2 INJECTION INTRAMUSCULAR; INTRAVENOUS at 05:05

## 2025-05-01 RX ADMIN — GABAPENTIN 300 MG: 250 SOLUTION ORAL at 10:05

## 2025-05-01 RX ADMIN — ACETAMINOPHEN 999.01 MG: 650 SOLUTION ORAL at 10:05

## 2025-05-01 RX ADMIN — DEXMEDETOMIDINE 8 MCG: 200 INJECTION, SOLUTION INTRAVENOUS at 07:05

## 2025-05-01 RX ADMIN — ACETAMINOPHEN 1000 MG: 10 INJECTION INTRAVENOUS at 06:05

## 2025-05-01 RX ADMIN — SUGAMMADEX 600 MG: 100 INJECTION, SOLUTION INTRAVENOUS at 09:05

## 2025-05-01 RX ADMIN — OXYCODONE HYDROCHLORIDE 5 MG: 5 SOLUTION ORAL at 12:05

## 2025-05-01 RX ADMIN — KETOROLAC TROMETHAMINE 15 MG: 15 INJECTION, SOLUTION INTRAMUSCULAR; INTRAVENOUS at 05:05

## 2025-05-01 RX ADMIN — SCOPOLAMINE 1 PATCH: 1.5 PATCH, EXTENDED RELEASE TRANSDERMAL at 06:05

## 2025-05-01 RX ADMIN — ONDANSETRON 8 MG: 2 INJECTION INTRAMUSCULAR; INTRAVENOUS at 11:05

## 2025-05-01 RX ADMIN — FAMOTIDINE 20 MG: 10 INJECTION, SOLUTION INTRAVENOUS at 05:05

## 2025-05-01 RX ADMIN — Medication 15 MG: at 09:05

## 2025-05-01 RX ADMIN — METRONIDAZOLE 500 MG: 5 INJECTION, SOLUTION INTRAVENOUS at 07:05

## 2025-05-01 RX ADMIN — MIDAZOLAM 2 MG: 1 INJECTION INTRAMUSCULAR; INTRAVENOUS at 06:05

## 2025-05-01 RX ADMIN — ONDANSETRON 4 MG: 2 INJECTION INTRAMUSCULAR; INTRAVENOUS at 09:05

## 2025-05-01 RX ADMIN — SODIUM CHLORIDE: 9 INJECTION, SOLUTION INTRAVENOUS at 07:05

## 2025-05-01 RX ADMIN — Medication 10 MG: at 08:05

## 2025-05-01 RX ADMIN — DEXMEDETOMIDINE 8 MCG: 200 INJECTION, SOLUTION INTRAVENOUS at 08:05

## 2025-05-01 RX ADMIN — PROPOFOL 200 MG: 10 INJECTION, EMULSION INTRAVENOUS at 07:05

## 2025-05-01 RX ADMIN — HEPARIN SODIUM 5000 UNITS: 5000 INJECTION INTRAVENOUS; SUBCUTANEOUS at 05:05

## 2025-05-01 RX ADMIN — LIDOCAINE HYDROCHLORIDE 100 MG: 20 INJECTION, SOLUTION EPIDURAL; INFILTRATION; INTRACAUDAL at 07:05

## 2025-05-01 RX ADMIN — CEFAZOLIN 3 G: 330 INJECTION, POWDER, FOR SOLUTION INTRAMUSCULAR; INTRAVENOUS at 07:05

## 2025-05-01 NOTE — TRANSFER OF CARE
"Anesthesia Transfer of Care Note    Patient: Elva Caceres    Procedure(s) Performed: Procedure(s) (LRB):  GASTROENTEROSTOMY, LAPAROSCOPIC W/ INTRA OP EGD +/-HHR (N/A)  BIOPSY, LIVER (N/A)    Patient location: PACU    Anesthesia Type: general    Transport from OR: Transported from OR on 6-10 L/min O2 by face mask with adequate spontaneous ventilation    Post pain: adequate analgesia    Post assessment: no apparent anesthetic complications and tolerated procedure well    Post vital signs: stable    Level of consciousness: awake and alert    Nausea/Vomiting: no nausea/vomiting    Complications: none    Transfer of care protocol was followed      Last vitals: Visit Vitals  /65   Pulse 89   Temp 36.7 °C (98.1 °F) (Temporal)   Resp (!) 23   Ht 5' 8" (1.727 m)   Wt (!) 173.6 kg (382 lb 11.5 oz)   LMP 03/03/2025 (Exact Date)   SpO2 98%   Breastfeeding No   BMI 58.19 kg/m²     "

## 2025-05-01 NOTE — PLAN OF CARE
Prepped pt for surgery. Procedure reviewed and confirmed with pt.. All questions answered, no concerns. Bed in lowest position. Call light within reach. Safety maintained.     Pt states significant other will be here later. Placed all personal belongings in locker.

## 2025-05-01 NOTE — ANESTHESIA PROCEDURE NOTES
Intubation    Date/Time: 5/1/2025 7:15 AM    Performed by: Donnie Starr DO  Authorized by: Russ Baer MD    Intubation:     Induction:  Intravenous    Intubated:  Postinduction    Mask Ventilation:  Not attempted    Attempts:  1    Attempted By:  Resident anesthesiologist    Method of Intubation:  Video laryngoscopy    Blade:  Regan 3    Laryngeal View Grade: Grade I - full view of cords      Difficult Airway Encountered?: No      Complications:  None    Airway Device Size:  7.0    Style/Cuff Inflation:  Cuffed (inflated to minimal occlusive pressure)    Inflation Amount (mL):  5    Tube secured:  22    Placement Verified By:  Capnometry    Complicating Factors:  Obesity    Findings Post-Intubation:  BS equal bilateral and atraumatic/condition of teeth unchanged

## 2025-05-01 NOTE — PROGRESS NOTES
Pt remained stable during pacu stay.VSS. Patient c/o pain to sites 5/10. States more tolerable and had nausea earlier only spitting up sputum. Pt received scheduled and and prn antiemetic medication. States nausea is better now.  X5 sites in abdomen with steri-strips. Teds/SCD's in place throughout duration in PACU. Transferred to the next Phase of Care.

## 2025-05-01 NOTE — ANESTHESIA PROCEDURE NOTES
Bilateral FELI single injection    Patient location during procedure: pre-op   Block not for primary anesthetic.  Reason for block: at surgeon's request   Post-op Pain Location: abdominal pain   Start time: 5/1/2025 6:49 AM  Timeout: 5/1/2025 6:48 AM   End time: 5/1/2025 6:53 AM    Staffing  Authorizing Provider: Russ Baer MD  Performing Provider: Donnie Starr DO    Staffing  Performed by: Donnie Starr DO  Authorized by: Russ Baer MD    Preanesthetic Checklist  Completed: patient identified, IV checked, site marked, risks and benefits discussed, surgical consent, monitors and equipment checked, pre-op evaluation and timeout performed  Peripheral Block  Patient position: sitting  Prep: ChloraPrep  Patient monitoring: heart rate, cardiac monitor, continuous pulse ox, continuous capnometry and frequent blood pressure checks  Block type: erector spinae plane  Laterality: bilateral  Injection technique: single shot  Interspace: T8-9    Needle  Needle type: Echogenic   Needle gauge: 20 G  Needle length: 4 in  Needle localization: anatomical landmarks and ultrasound guidance   -ultrasound image captured on disc.  Assessment  Injection assessment: negative aspiration, negative parasthesia and local visualized surrounding nerve  Paresthesia pain: none  Heart rate change: no  Slow fractionated injection: yes  Pain Tolerance: comfortable throughout block and no complaints  Medications:    Medications: bupivacaine (pf) (MARCAINE) injection 0.75% - Perineural   30 mL - 5/1/2025 6:54:00 AM    Additional Notes  Patient tolerated well.  See [facility] RN record for vitals. Under ultrasound guidance 30cc of 0.375% bupivicaine with 1:499911 epi injected bilaterally. Negative aspiration, incremental injection and patient tolerated well.

## 2025-05-01 NOTE — NURSING
Water protocol initiated and patient has ambulated. No complaints at this time. Patient resting quietly. Will continue to monitor patient closely.

## 2025-05-01 NOTE — NURSING TRANSFER
Nursing Transfer Note      5/1/2025   10:03 AM    Nurse giving handoff:Asif schulte Rn  Nurse receiving handoff: Diana Marks    Reason patient is being transferred: postop    Transfer To: [facility] 505    Transfer via bed    Transfer with n/a    Transported by Pacu transport    Transfer Vital Signs:  Blood Pressure:see flowsheet  Heart Rate:  O2:  Temperature:  Respirations:    Telemetry: n/a  Order for Tele Monitor? No    Additional Lines: n/a    Medicines sent: LR    Any special needs or follow-up needed:     Patient belongings transferred with patient: yes. X2 bags on bed    Chart send with patient: Yes    Notified: S.O.    Patient reassessed at: 5/1/25  1  Upon arrival to floor: bed in lowest position

## 2025-05-01 NOTE — BRIEF OP NOTE
[facility] - Surgery ([facility])  Brief Operative Note    SUMMARY     Surgery Date: 5/1/2025     Surgeons and Role:     * Arnel Ang Jr., MD - Primary     * Jignesh Zurita MD - Resident - Assisting    Pre-op Diagnosis:  BMI 50.0-59.9, adult [Z68.43]  Hypertension, unspecified type [I10]  Gastroesophageal reflux disease with esophagitis, unspecified whether hemorrhage [K21.00]  Morbid obesity [E66.01]    Post-op Diagnosis:  Post-Op Diagnosis Codes:     * BMI 50.0-59.9, adult [Z68.43]     * Hypertension, unspecified type [I10]     * Gastroesophageal reflux disease with esophagitis, unspecified whether hemorrhage [K21.00]     * Morbid obesity [E66.01]    Procedure(s) (LRB):  GASTROENTEROSTOMY, LAPAROSCOPIC W/ INTRA OP EGD +/-HHR (N/A)  BIOPSY, LIVER (N/A)    Anesthesia: General/Regional    Implants:  Implant Name Type Inv. Item Serial No.  Lot No. LRB No. Used Action   SEAMGUARD ESCHELON 60 MM. - [id number]  SEAMGUARD ESCHELON 60 MM.  [employer] [id number] N/A 1 Implanted   SEAMGUARD ESCHELON 60 MM. - [id number]  SEAMGUARD ESCHELON 60 MM.  [employer] [id number] N/A 1 Implanted   SEAMGUARD ESCHELON 60 MM. - [id number]  SEAMGUARD ESCHELON 60 MM.  [employer] [id number] N/A 1 Implanted   SEAMGUARD ESCHELON 60 MM. - [id number]  SEAMGUARD ESCHELON 60 MM.  [employer] [id number] N/A 1 Implanted       Operative Findings:   - Speckled appearance of liver and spleen with fatty-appearing deposits. Biopsy taken at inferior edge of left liver lobe.  - Fatty mass vs node posterior to stomach.  - Toyin-en-y gastric bypass performed: JJ 45cm from ligament of Treitz, 150cm antecolic toyin limb, GJ performed with 25mm EEA stapler  - Negative leak test    Estimated Blood Loss: * No values recorded between 5/1/2025  7:08 AM and 5/1/2025  9:33 AM *    Estimated Blood Loss has not been documented. EBL = 5 mL.         Specimens:   Specimen (24h ago, onward)       Start     Ordered    05/01/25 0922  Specimen to Pathology General Surgery   RELEASE UPON ORDERING        References:    Click here for ordering Quick Tip   Question:  Release to patient  Answer:  Immediate    05/01/25 0922                  ID Type Source Tests Collected by Time Destination   1 : Liver biopsy Tissue Abdomen SPECIMEN TO PATHOLOGY Arnel Ang Jr., MD 5/1/2025 0802        [id number]

## 2025-05-01 NOTE — OP NOTE
DATE OF PROCEDURE: 05/01/2025  SERVICE: Bariatric Surgery.   Surgeons and Role:     * Arnel Ang Jr., MD - Primary     * Jignesh Zurita MD - Resident - Assisting  PREOPERATIVE DIAGNOSES: Morbid obesity with a Body mass index is 58.19 kg/m².   along with benign hypertension, GERD, and hyperlipidemia.    POSTOPERATIVE DIAGNOSES: Same and Liver Fatty Infiltrate.  PROCEDURE: Laparoscopic Francisco-en-Y gastric bypass, Liver Biopsy, and EGD.   ANESTHESIA: General endotracheal and local.   DESCRIPTION OF PROCEDURE: The patient was taken to the Operating Room, placed under general anesthesia and prepped and draped in a sterile fashion. At this   time, an incision was made approximately 15 cm from the xiphoid and 5 cm to the   left of midline after infiltrating with local anesthetic. Using Optiview view   trocar, intra-abdominal access was obtained under direct visualization. At this   time, once the port was placed, pneumoperitoneum was obtained and further ports   were placed including bilateral anterior axillary subcostal 5 mm ports and   midclavicular subcostal port on the right and a second 12 port approximately 15   cm from the xiphoid just to right of the midline. All these ports were placed   under direct visualization after infiltrating with local anesthetic. The liver was noted to have what appeared to be islands of fatty infiltrate.  This was biopsied. The edge of the liver was grasped.  The Sonicision was used to obtain a piece of liver.  At this time any bleeding from the liver was stopped with electrocautery.  Once hemostasis was acheived we proceeded with the next step of the procedure.  We proceeded with the jejunojejunostomy. We elevated the omentum   and transected this with a Sonicision to the transverse colon. The   transverse colon was then elevated. We found the ligament of Treitz and then   ran the small bowel 45 cm and transected the bowel at this point with a white   load stapler with  SeamGuard. We then took several centimeters of mesentery with   the Sonicision to allow extra length on our Francisco limb. Once we had   completed this, we then ran the distal portion of the small bowel 150 cm and   tacked this to the proximal small bowel with a 2-0 Surgidac suture on an   EndoStitch device. This was elevated and a small enterotomy was made on either   side of the small bowel. A white load stapler was placed in either limb of the   small bowel and fired creating a jejunojejunostomy. The jejunojejunostomy was   then elevated and the common enterotomy was then closed with a 2-0 Surgidac   suture on an EndoStitch device in 2-layer running fashion. Once this was   complete, we also closed the mesenteric defect, again with a 2-0 Surgidac on an   EndoStitch device in running fashion. This then completed, the jejunojejunostomy was inspected and appeared to be in very good condition. We turned our attention then to   formation of the gastric pouch. A liver retractor was placed. The patient was   placed in steep reverse Trendelenburg. An area on the lesser curve at the   second bridging vein was identified and using the Sonicision, the   gastrohepatic ligament was opened and the lesser sac was exposed. We then   placed a blue load stapler transversely across the stomach after ensuring there   was nothing in the stomach from anesthesia perspective. We fired the stapler   transversely. We then fired further staple loads, blue with SeamGuard towards   the angle of His, completing the pouch. Once the pouch was complete, we made a   small hole with the Sonicision in the transverse portion of the gastric   pouch and an anvil was guided by Anesthesia into place. This was done by   placing the OG portion into the pouch. It was then brought out through the   small hole that was made and then this was used to pull the anvil into the pouch   and the OG portion was cut and removed and passed off the table. The Francisco limb    was then brought up and it was opened on the end with the Sonicision to   allow for the circular stapler device. At this time, the incision approximately   15 cm from the xiphoid just to right of midline was enlarged to accommodate the   25 mm circular stapler with 3.5 mm staple height. We opened this skin at the   skin level and then from the fascial level we opened the fascia with the   Sonicision and 0-Vicryl suture was placed in figure-of-eight fashion;   however, it was not tied down at this time and a wound retractor was placed into   this incision as well. The 25 mm circular stapler was placed into this   incision and then placed into the Francisco limb. The spike was brought out through   the side of the small bowel and attached to the anvil and the gastric pouch.   This was closed ensuring there was no tissue in between here and it was fired   creating the gastrojejunostomy. At this time, a 2-0 Polysorb suture was placed   in U-fashion on either side of the anastomosis for tension relief. The end of   the Francisco limb, which had been opened for placement of the circular stapler, was   then removed. This was done by taking the mesentery with Sonicision and   firing across the small bowel at the level of the gastric pouch with a white   load stapler with SeamGuard. The end of the small bowel that was removed was   then passed off the table. Everything was inspected and appeared to be in very   good condition. We then placed a bowel clamp over the Francisco limb and turned our   attention to an EGD. The scope was placed down the oropharynx and into the   esophagus followed down the esophagus and into the gastric pouch. At this time,   fluid was used to submerge the gastrojejunostomy and air was insufflated into   the gastric pouch. The Francisco limb dilated appropriately. There was air heard   coming back from the mouth and there were no signs of bubbles or any other   abnormalities. At this time, air was suctioned from the  Francisco limb and the pouch   and the scope was removed under direct visualization. We turned our attention   back to laparoscopic view. At this time, all the fluid was suctioned from the   abdominal cavity. The liver retractor was removed. The ports were removed   under direct visualization. The suture, which had been placed in the fascia of   the incision 15 cm from the xiphoid just to right of midline, was tied down   closing the fascia of this incision and this incision was then irrigated   copiously. The skin of all 5 port sites were then closed with 4-0 Monocryl   suture in subcuticular fashion. Mastisol and Steri-Strips were placed and the   patient was allowed to awake from general anesthesia and transferred to bed for   transport to Recovery.   COMPLICATIONS: None.   SPONGE COUNT: Correct.   BLOOD LOSS: 15 mL.   FLUIDS: Per Anesthesia.   BLOOD GIVEN: None.   DRAINS: None.   SPECIMENS: Liver Biopsy  CONDITION OF PATIENT: Good.   I was present for the entire procedure.

## 2025-05-02 VITALS
DIASTOLIC BLOOD PRESSURE: 64 MMHG | WEIGHT: 293 LBS | TEMPERATURE: 98 F | RESPIRATION RATE: 20 BRPM | HEIGHT: 68 IN | HEART RATE: 81 BPM | OXYGEN SATURATION: 98 % | BODY MASS INDEX: 44.41 KG/M2 | SYSTOLIC BLOOD PRESSURE: 111 MMHG

## 2025-05-02 LAB
ABSOLUTE EOSINOPHIL (OHS): 0 K/UL
ABSOLUTE MONOCYTE (OHS): 0.74 K/UL (ref 0.3–1)
ABSOLUTE NEUTROPHIL COUNT (OHS): 9.51 K/UL (ref 1.8–7.7)
ANION GAP (OHS): 9 MMOL/L (ref 8–16)
BASOPHILS # BLD AUTO: 0.01 K/UL
BASOPHILS NFR BLD AUTO: 0.1 %
BUN SERPL-MCNC: 5 MG/DL (ref 6–20)
CALCIUM SERPL-MCNC: 8.1 MG/DL (ref 8.7–10.5)
CHLORIDE SERPL-SCNC: 106 MMOL/L (ref 95–110)
CO2 SERPL-SCNC: 20 MMOL/L (ref 23–29)
CREAT SERPL-MCNC: 0.5 MG/DL (ref 0.5–1.4)
ERYTHROCYTE [DISTWIDTH] IN BLOOD BY AUTOMATED COUNT: 16 % (ref 11.5–14.5)
GFR SERPLBLD CREATININE-BSD FMLA CKD-EPI: >60 ML/MIN/1.73/M2
GLUCOSE SERPL-MCNC: 97 MG/DL (ref 70–110)
H. PYLORI SURFACE ANTIGEN, INTERPRETATION (OHS): NEGATIVE
HCT VFR BLD AUTO: 31.3 % (ref 37–48.5)
HELICOBACTER PYLORI SURFACE ANTIGEN (OHS): 0.09
HGB BLD-MCNC: 10.1 GM/DL (ref 12–16)
IMM GRANULOCYTES # BLD AUTO: 0.03 K/UL (ref 0–0.04)
IMM GRANULOCYTES NFR BLD AUTO: 0.3 % (ref 0–0.5)
LYMPHOCYTES # BLD AUTO: 0.71 K/UL (ref 1–4.8)
MAGNESIUM SERPL-MCNC: 1.4 MG/DL (ref 1.6–2.6)
MCH RBC QN AUTO: 26.4 PG (ref 27–31)
MCHC RBC AUTO-ENTMCNC: 32.3 G/DL (ref 32–36)
MCV RBC AUTO: 82 FL (ref 82–98)
NUCLEATED RBC (/100WBC) (OHS): 0 /100 WBC
PHOSPHATE SERPL-MCNC: 1.7 MG/DL (ref 2.7–4.5)
PLATELET # BLD AUTO: 162 K/UL (ref 150–450)
PMV BLD AUTO: 12.1 FL (ref 9.2–12.9)
POTASSIUM SERPL-SCNC: 3.5 MMOL/L (ref 3.5–5.1)
RBC # BLD AUTO: 3.82 M/UL (ref 4–5.4)
RELATIVE EOSINOPHIL (OHS): 0 %
RELATIVE LYMPHOCYTE (OHS): 6.5 % (ref 18–48)
RELATIVE MONOCYTE (OHS): 6.7 % (ref 4–15)
RELATIVE NEUTROPHIL (OHS): 86.4 % (ref 38–73)
SODIUM SERPL-SCNC: 135 MMOL/L (ref 136–145)
WBC # BLD AUTO: 11 K/UL (ref 3.9–12.7)

## 2025-05-02 PROCEDURE — 83735 ASSAY OF MAGNESIUM: CPT | Performed by: SURGERY

## 2025-05-02 PROCEDURE — 25000003 PHARM REV CODE 250: Performed by: SURGERY

## 2025-05-02 PROCEDURE — 84100 ASSAY OF PHOSPHORUS: CPT | Performed by: SURGERY

## 2025-05-02 PROCEDURE — 25000003 PHARM REV CODE 250: Performed by: STUDENT IN AN ORGANIZED HEALTH CARE EDUCATION/TRAINING PROGRAM

## 2025-05-02 PROCEDURE — 63600175 PHARM REV CODE 636 W HCPCS: Performed by: SURGERY

## 2025-05-02 PROCEDURE — 82374 ASSAY BLOOD CARBON DIOXIDE: CPT | Performed by: SURGERY

## 2025-05-02 PROCEDURE — 85025 COMPLETE CBC W/AUTO DIFF WBC: CPT | Performed by: SURGERY

## 2025-05-02 PROCEDURE — 36415 COLL VENOUS BLD VENIPUNCTURE: CPT | Performed by: SURGERY

## 2025-05-02 RX ORDER — MAGNESIUM SULFATE HEPTAHYDRATE 40 MG/ML
2 INJECTION, SOLUTION INTRAVENOUS ONCE
Status: COMPLETED | OUTPATIENT
Start: 2025-05-02 | End: 2025-05-02

## 2025-05-02 RX ADMIN — ONDANSETRON 8 MG: 2 INJECTION INTRAMUSCULAR; INTRAVENOUS at 01:05

## 2025-05-02 RX ADMIN — ACETAMINOPHEN 999.01 MG: 650 SOLUTION ORAL at 01:05

## 2025-05-02 RX ADMIN — MAGNESIUM SULFATE HEPTAHYDRATE 2 G: 40 INJECTION, SOLUTION INTRAVENOUS at 08:05

## 2025-05-02 RX ADMIN — DIBASIC SODIUM PHOSPHATE, MONOBASIC POTASSIUM PHOSPHATE AND MONOBASIC SODIUM PHOSPHATE 2 TABLET: 852; 155; 130 TABLET ORAL at 08:05

## 2025-05-02 RX ADMIN — ONDANSETRON 8 MG: 2 INJECTION INTRAMUSCULAR; INTRAVENOUS at 05:05

## 2025-05-02 RX ADMIN — ENOXAPARIN SODIUM 60 MG: 60 INJECTION SUBCUTANEOUS at 08:05

## 2025-05-02 RX ADMIN — AMLODIPINE BESYLATE 10 MG: 5 TABLET ORAL at 08:05

## 2025-05-02 RX ADMIN — GABAPENTIN 300 MG: 250 SOLUTION ORAL at 08:05

## 2025-05-02 RX ADMIN — PANTOPRAZOLE SODIUM 40 MG: 40 INJECTION, POWDER, FOR SOLUTION INTRAVENOUS at 08:05

## 2025-05-02 NOTE — ASSESSMENT & PLAN NOTE
44 year old female with obesity     - bariatric clear liquid diet protocol   - mIVF until tolerating clear liquids  - MMPC, PRN narcotic  - scheduled anti-emetics, PPI, PRN as well  - Daily labs while inpatient, replete lytes as necessary   - Home meds reviewed, restarted as appropriate  - DVT ppx      Dispo: potential PM DC if tolerating sufficient oral intake

## 2025-05-02 NOTE — PLAN OF CARE
Problem: Adult Inpatient Plan of Care  Goal: Plan of Care Review  Outcome: Progressing  Goal: Patient-Specific Goal (Individualized)  Outcome: Progressing  Goal: Absence of Hospital-Acquired Illness or Injury  Outcome: Progressing  Goal: Optimal Comfort and Wellbeing  Outcome: Progressing  Goal: Readiness for Transition of Care  Outcome: Progressing     Problem: Bariatric Environmental Safety  Goal: Safety Maintained with Care  Outcome: Progressing     Problem: Wound  Goal: Optimal Coping  Outcome: Progressing     Patient progressing towards goal. Patient ambulated in hallway without difficulty. Patient started on water protocol overnight. Patient denies any  nausea overnight. Surgical incision is clean, dry and intact. Patient denies any pain at the moment. Patient is voiding well without difficulty. Educated patient on plan of care, patient verbalized understanding. Bed locked and in lowest position. Call light within reach.

## 2025-05-02 NOTE — SUBJECTIVE & OBJECTIVE
Interval History: NAEON  POD 1 from lap bypass  Feels well; emesis yesterday but none with sips today. Passed sip protocol but reports not wanting to try much clear liquids   Ambulated   Pain controlled     Medications:  Continuous Infusions:   0.9% NaCl   Intravenous Continuous        lactated ringers   Intravenous Continuous 125 mL/hr at 05/01/25 1641 New Bag at 05/01/25 1641     Scheduled Meds:   acetaminophen  999.0148 mg Oral Q8H    amLODIPine  10 mg Oral Daily    enoxparin  60 mg Subcutaneous Q12H    gabapentin  300 mg Oral BID    magnesium sulfate 2 g IVPB  2 g Intravenous Once    ondansetron  8 mg Intravenous Q6H    pantoprazole  40 mg Intravenous BID    scopolamine  1 patch Transdermal Once     PRN Meds:  Current Facility-Administered Medications:     acetaminophen, 499.5074 mg, Oral, Q24H PRN    oxyCODONE, 5 mg, Oral, Q6H PRN    prochlorperazine, 5 mg, Intravenous, Q6H PRN    simethicone, 40 mg, Oral, QID PRN     Review of patient's allergies indicates:   Allergen Reactions    Penicillins Hives     Objective:     Vital Signs (Most Recent):  Temp: 97.7 °F (36.5 °C) (05/02/25 0826)  Pulse: 75 (05/02/25 0826)  Resp: 20 (05/02/25 0826)  BP: (!) 104/54 (05/02/25 0826)  SpO2: 98 % (05/02/25 0826) Vital Signs (24h Range):  Temp:  [97.2 °F (36.2 °C)-98.1 °F (36.7 °C)] 97.7 °F (36.5 °C)  Pulse:  [75-95] 75  Resp:  [18-20] 20  SpO2:  [90 %-100 %] 98 %  BP: (104-147)/(54-78) 104/54     Weight: (!) 173.6 kg (382 lb 11.5 oz)  Body mass index is 58.19 kg/m².    Intake/Output - Last 3 Shifts         04/30 0700  05/01 0659 05/01 0700  05/02 0659 05/02 0700  05/03 0659    IV Piggyback  1050     Total Intake(mL/kg)  1050 (6)     Net  +1050            Urine Occurrence  1 x              Physical Exam  Vitals and nursing note reviewed.   Constitutional:       Appearance: Normal appearance.   HENT:      Head: Normocephalic and atraumatic.   Cardiovascular:      Rate and Rhythm: Normal rate and regular rhythm.   Pulmonary:       Effort: Pulmonary effort is normal. No respiratory distress.   Abdominal:      General: Abdomen is flat. There is no distension.      Palpations: Abdomen is soft. There is no mass.      Tenderness: There is no abdominal tenderness.      Hernia: No hernia is present.      Comments: Incisions c/d/I with steri strips in place   Abdomen appropriately tender    Musculoskeletal:      Right lower leg: No edema.      Left lower leg: No edema.   Skin:     General: Skin is warm and dry.   Neurological:      General: No focal deficit present.      Mental Status: She is alert and oriented to person, place, and time.   Psychiatric:         Mood and Affect: Mood normal.         Behavior: Behavior normal.          Significant Labs:  I have reviewed all pertinent lab results within the past 24 hours.  CBC:   Recent Labs   Lab 05/02/25  0437   WBC 11.00   RBC 3.82*   HGB 10.1*   HCT 31.3*      MCV 82   MCH 26.4*   MCHC 32.3     BMP:   Recent Labs   Lab 05/02/25  0437   GLU 97   *   K 3.5      CO2 20*   BUN 5*   CREATININE 0.5   CALCIUM 8.1*   MG 1.4*       Significant Diagnostics:  I have reviewed all pertinent imaging results/findings within the past 24 hours.

## 2025-05-02 NOTE — PLAN OF CARE
[facility] - [facility]  Initial Discharge Assessment       Primary Care Provider: [facility]    Admission Diagnosis: BMI 50.0-59.9, adult [Z68.43]  Hypertension, unspecified type [I10]  Gastroesophageal reflux disease with esophagitis, unspecified whether hemorrhage [K21.00]  Morbid obesity [E66.01]  Morbid obesity with BMI of 50.0-59.9, adult [E66.01, Z68.43]  Obesity [E66.9]    Admission Date: 5/1/2025  Expected Discharge Date: 5/2/2025    Transition of Care Barriers: None    Payor: [facility] / Plan: [facility] CHOICE PLUS / Product Type: Commercial /     Extended Emergency Contact Information  Primary Emergency Contact: JORGE CLAROS  Home Phone: [phone]  Mobile Phone: [phone]  Relation: Significant other  Preferred language: English   needed? No  Secondary Emergency Contact: Reva Caceres   [place]  Mobile Phone: [phone]  Relation: Mother    Discharge Plan A: Home with family  Discharge Plan B: Home with family, Home Health      [id number] - [place] - [address]  [address] [address]  Phone: [phone] Fax: [phone]      Initial Assessment (most recent)       Adult Discharge Assessment - 05/02/25 0912          Discharge Assessment    Assessment Type Discharge Planning Assessment     Confirmed/corrected address, phone number and insurance Yes     Confirmed Demographics Correct on Facesheet     Source of Information patient     Communicated GINI with patient/caregiver Yes     People in Home significant other;child(ariana), dependent     Do you expect to return to your current living situation? Yes     Do you have help at home or someone to help you manage your care at home? Yes     Who are your caregiver(s) and their phone number(s)? Alyce Beavers # [phone]     Prior to hospitilization cognitive status: Alert/Oriented     Current cognitive status: Alert/Oriented     Walking or Climbing Stairs  Difficulty no     Dressing/Bathing Difficulty no     Home Accessibility wheelchair accessible     Home Layout Able to live on 1st floor     Equipment Currently Used at Home none     Readmission within 30 days? No     Patient currently being followed by outpatient case management? No     Do you currently have service(s) that help you manage your care at home? No     Do you take prescription medications? No     Do you have prescription coverage? Yes     Coverage Alyce Beavers # [phone]/ Mother Reva # [phone]     How do you get to doctors appointments? car, drives self;family or friend will provide     Are you on dialysis? No     Do you take coumadin? No     Discharge Plan A Home with family     Discharge Plan B Home with family;Home Health     DME Needed Upon Discharge  none     Discharge Plan discussed with: Patient     Transition of Care Barriers None                       SW completed discharge planning assessment with the patient at bedside. SW verified demographic information listed on the pt.'s Face sheet. Pt reports living in a single story home with her Fiance (Alyce Beavers # [phone] ) whom she plans to help manage her care upon discharge. Pt's mother (Reva) will be providing transportation and care upon discharge also.Patient doesn't report utilizing any equipment prior to this hospital stay, nor reports any DME needs upon discharge at this time.SW is following this Pt for DC planning needs. There are no identified needs at this time.    Discharge Plan A and Plan B have been determined by review of patient's clinical status, future medical and therapeutic needs, and coverage/benefits for post-acute care in coordination with multidisciplinary team members.      Rica Seals LCSW  Case Management   [facility]   Ext. [phone]

## 2025-05-02 NOTE — ANESTHESIA POSTPROCEDURE EVALUATION
Anesthesia Post Evaluation    Patient: Elva Caceres    Procedure(s) Performed: Procedure(s) (LRB):  GASTROENTEROSTOMY, LAPAROSCOPIC W/ INTRA OP EGD +/-HHR (N/A)  BIOPSY, LIVER (N/A)    Final Anesthesia Type: general      Patient location during evaluation: PACU  Patient participation: Yes- Able to Participate  Level of consciousness: awake and alert and oriented  Post-procedure vital signs: reviewed and stable  Pain management: adequate  Airway patency: patent    PONV status at discharge: No PONV  Anesthetic complications: no      Cardiovascular status: hemodynamically stable  Respiratory status: unassisted, spontaneous ventilation and room air  Hydration status: euvolemic  Follow-up not needed.              Vitals Value Taken Time   /64 05/02/25 04:48   Temp 36.7 °C (98 °F) 05/02/25 04:48   Pulse 79 05/02/25 04:48   Resp 18 05/02/25 04:48   SpO2 95 % 05/02/25 04:48         Event Time   Out of Recovery 10:44:00         Pain/Rupert Score: Pain Rating Prior to Med Admin: 0 (5/2/2025  5:38 AM)  Pain Rating Post Med Admin: 2 (5/1/2025 10:45 PM)  Rupert Score: 9 (5/1/2025  3:45 PM)

## 2025-05-02 NOTE — PROGRESS NOTES
[facility] - [facility]  General Surgery  Progress Note    Subjective:     History of Present Illness:  No notes on file    Post-Op Info:  Procedure(s) (LRB):  GASTROENTEROSTOMY, LAPAROSCOPIC W/ INTRA OP EGD +/-HHR (N/A)  BIOPSY, LIVER (N/A)   1 Day Post-Op     Interval History: NAEON  POD 1 from lap bypass  Feels well; emesis yesterday but none with sips today. Passed sip protocol but reports not wanting to try much clear liquids   Ambulated   Pain controlled     Medications:  Continuous Infusions:   0.9% NaCl   Intravenous Continuous        lactated ringers   Intravenous Continuous 125 mL/hr at 05/01/25 1641 New Bag at 05/01/25 1641     Scheduled Meds:   acetaminophen  999.0148 mg Oral Q8H    amLODIPine  10 mg Oral Daily    enoxparin  60 mg Subcutaneous Q12H    gabapentin  300 mg Oral BID    magnesium sulfate 2 g IVPB  2 g Intravenous Once    ondansetron  8 mg Intravenous Q6H    pantoprazole  40 mg Intravenous BID    scopolamine  1 patch Transdermal Once     PRN Meds:  Current Facility-Administered Medications:     acetaminophen, 499.5074 mg, Oral, Q24H PRN    oxyCODONE, 5 mg, Oral, Q6H PRN    prochlorperazine, 5 mg, Intravenous, Q6H PRN    simethicone, 40 mg, Oral, QID PRN     Review of patient's allergies indicates:   Allergen Reactions    Penicillins Hives     Objective:     Vital Signs (Most Recent):  Temp: 97.7 °F (36.5 °C) (05/02/25 0826)  Pulse: 75 (05/02/25 0826)  Resp: 20 (05/02/25 0826)  BP: (!) 104/54 (05/02/25 0826)  SpO2: 98 % (05/02/25 0826) Vital Signs (24h Range):  Temp:  [97.2 °F (36.2 °C)-98.1 °F (36.7 °C)] 97.7 °F (36.5 °C)  Pulse:  [75-95] 75  Resp:  [18-20] 20  SpO2:  [90 %-100 %] 98 %  BP: (104-147)/(54-78) 104/54     Weight: (!) 173.6 kg (382 lb 11.5 oz)  Body mass index is 58.19 kg/m².    Intake/Output - Last 3 Shifts         04/30 0700 05/01 0659 05/01 0700 05/02 0659 05/02 0700 05/03 0659    IV Piggyback  1050     Total Intake(mL/kg)  1050 (6)     Net  +1050            Urine Occurrence  1 x               Physical Exam  Vitals and nursing note reviewed.   Constitutional:       Appearance: Normal appearance.   HENT:      Head: Normocephalic and atraumatic.   Cardiovascular:      Rate and Rhythm: Normal rate and regular rhythm.   Pulmonary:      Effort: Pulmonary effort is normal. No respiratory distress.   Abdominal:      General: Abdomen is flat. There is no distension.      Palpations: Abdomen is soft. There is no mass.      Tenderness: There is no abdominal tenderness.      Hernia: No hernia is present.      Comments: Incisions c/d/I with steri strips in place   Abdomen appropriately tender    Musculoskeletal:      Right lower leg: No edema.      Left lower leg: No edema.   Skin:     General: Skin is warm and dry.   Neurological:      General: No focal deficit present.      Mental Status: She is alert and oriented to person, place, and time.   Psychiatric:         Mood and Affect: Mood normal.         Behavior: Behavior normal.          Significant Labs:  I have reviewed all pertinent lab results within the past 24 hours.  CBC:   Recent Labs   Lab 05/02/25  0437   WBC 11.00   RBC 3.82*   HGB 10.1*   HCT 31.3*      MCV 82   MCH 26.4*   MCHC 32.3     BMP:   Recent Labs   Lab 05/02/25  0437   GLU 97   *   K 3.5      CO2 20*   BUN 5*   CREATININE 0.5   CALCIUM 8.1*   MG 1.4*       Significant Diagnostics:  I have reviewed all pertinent imaging results/findings within the past 24 hours.  Assessment/Plan:     * Obesity  44 year old female with obesity     - bariatric clear liquid diet protocol   - mIVF until tolerating clear liquids  - MMPC, PRN narcotic  - scheduled anti-emetics, PPI, PRN as well  - Daily labs while inpatient, replete lytes as necessary   - Home meds reviewed, restarted as appropriate  - DVT ppx      Dispo: potential PM DC if tolerating sufficient oral intake         Olive Bell MD  General Surgery  [facility] - Surgery

## 2025-05-03 NOTE — DISCHARGE SUMMARY
[facility] - [facility]  General Surgery  Discharge Summary      Patient Name: Elva Caceres  MRN: [id number]  Admission Date: 5/1/2025  Hospital Length of Stay: 1 days  Discharge Date and Time: 5/2/2025  6:01 PM  Attending Physician: Pita att. providers found   Discharging Provider: Olive Bell MD  Primary Care Provider: [facility]    HPI:   The patient is a 44 y.o. obese female who presents for pre op for gastric bypass surgery.  She has multiple associated comorbidities including essential hypertension.  Her highest adult weight was 397 lbs at age 44, and her lowest adult weight was 250lbs at age 39ish.  The patient has tried phentermine (Adipex-P) and Wegovy .  The patient was most successful with Adipex with a weight loss of 30lbs.  Her current exercise includes walking 2 times a week. She denies any history of eating disorder such as anorexia, bulimia, or taking laxatives for weight loss, and denies any addiction including illicit substances, alcohol, or gambling.  Patient states she has a good  support system.  She is currently employed in customer service .  She  denies a history of GERD.  The patient's goal is improve health.  All workup has been reviewed in clinic today and there is nothing on the review that would prevent us from proceeding with surgery.  All questions were answered in clinic today prior to leaving.  Body mass index is 56.39 kg/m².     Procedure(s) (LRB):  GASTROENTEROSTOMY, LAPAROSCOPIC W/ INTRA OP EGD +/-HHR (N/A)  BIOPSY, LIVER (N/A)      Indwelling Lines/Drains at time of discharge:   Lines/Drains/Airways       None                 Hospital Course: The patient tolerated above procedure well. She advanced per bariatric pathway appropriately. She was discharged POD 1 tolerating adequate clear liquid intake, ambulating with adequate pain and nausea control.     Goals of Care Treatment Preferences:  Code Status: Full Code      Consults:     Significant Diagnostic Studies:  N/A    Pending Diagnostic Studies:       None          Final Active Diagnoses:    Diagnosis Date Noted POA    PRINCIPAL PROBLEM:  Obesity [E66.9] 05/01/2025 Yes      Problems Resolved During this Admission:      Discharged Condition: good    Disposition: Home or Self Care    Follow Up:    Patient Instructions:      Diet Bariatric High Protein Clear Liquid   Order Comments: No soft drinks     Lifting restrictions (nothing greater than 10lbs)   Scheduling Instructions: Lifting restrictions:  nothing greater than 10lbs     No driving until:   Order Comments: No driving until off narcotic pain medication.  Can turn around without pain off narcotics.  At least 1 week.     Notify your health care provider if you experience any of the following:   Order Comments: temperature > 100.4, persistent nausea and vomiting or diarrhea, severe uncontrolled pain, redness, tenderness, or signs of infection (pain, swelling, redness, odor or green/yellow discharge around incision site), difficulty breathing or increased cough, severe persistent headache, worsening rash, persistent dizziness, light-headedness, or visual disturbances, increased confusion or weakness     Activity as tolerated     Shower on day two and no bath     Medications:  Reconciled Home Medications:      Medication List        CONTINUE taking these medications      * TYLENOL EXTRA STRENGTH 500 mg Pwpk  Generic drug: acetaminophen  Take 1,000 mg by mouth every 8 (eight) hours. Take 1g (2 packets) every 8 hours for at least 3 days and up to 5 days as needed. May take one additional packet (500mg) per day for breakthrough.  Do not exceed 4g in 24 hours. for 5 days     amLODIPine 10 MG tablet  Commonly known as: NORVASC  Take 10 mg by mouth once daily.     ergocalciferol 50,000 unit Cap  Commonly known as: ERGOCALCIFEROL  Take 50,000 Units by mouth twice a week.     gabapentin 300 MG capsule  Commonly known as: NEURONTIN  OPEN capsule into a tablespoon and consume with  sip of liquid. Take once the MORNING OF SURGERY. After surgery: take one capsule TWICE DAILY for at least 3 days and up to 5 days as needed for pain.     norgestimate-ethinyl estradioL 0.25-0.035 mg per tablet  Commonly known as: ORTHO-CYCLEN  Take 1 tablet by mouth once daily.     omeprazole 40 MG capsule  Commonly known as: PRILOSEC  Take 1 capsule (40 mg total) by mouth every morning. Open capsule and take with apple sauce     ondansetron 8 MG Tbdl  Commonly known as: ZOFRAN-ODT  Dissolve 1 tablet (8 mg total) by mouth every 6 (six) hours as needed (Nausea).     polyethylene glycol 17 gram/dose powder  Commonly known as: GLYCOLAX  Use cap to measure 17 grams.  Dissolve as directed and take by mouth once daily.     promethazine 12.5 MG Supp  Commonly known as: PHENERGAN  Place 1 suppository (12.5 mg total) rectally every 6 (six) hours as needed (nausea, 2nd line).     triamterene-hydrochlorothiazide 37.5-25 mg 37.5-25 mg per capsule  Commonly known as: DYAZIDE  TAKE ONE CAPSULE BY MOUTH IN THE MORNING     ursodioL 500 MG tablet  Commonly known as: MARYANNE FORTE  Take 1 tablet (500 mg total) by mouth once daily. For gallstone prevention.           * This list has 1 medication(s) that are the same as other medications prescribed for you. Read the directions carefully, and ask your doctor or other care provider to review them with you.                STOP taking these medications      ALEVE 220 MG tablet  Generic drug: naproxen sodium            ASK your doctor about these medications      * acetaminophen 325 MG tablet  Commonly known as: TYLENOL  Take 1 tablet (325 mg total) by mouth every 6 (six) hours as needed for Pain.     BREO ELLIPTA 100-25 mcg/dose diskus inhaler  Generic drug: fluticasone furoate-vilanteroL     diclofenac sodium 1 % Gel  Commonly known as: VOLTAREN  Apply 2 g topically 4 (four) times daily.     fluticasone propionate 50 mcg/actuation nasal spray  Commonly known as: FLONASE  1 spray by Each  Nostril route daily as needed.           * This list has 1 medication(s) that are the same as other medications prescribed for you. Read the directions carefully, and ask your doctor or other care provider to review them with you.                Time spent on the discharge of patient: 30 minutes    Olive Bell MD  General Surgery  [facility] - Surgery

## 2025-05-05 NOTE — PLAN OF CARE
05/05/25 0812   Final Note   Assessment Type Final Discharge Note   Anticipated Discharge Disposition Home     Patient discharged Home with self-care on 05/02/2025.    Future Appointments   Date Time Provider Department [place]   5/7/2025 12:05 PM LAB, APPOINTMENT [facility] NORA [facility] LAB [facility]   5/7/2025  1:00 PM Arnel Ang Jr., MD [facility]   5/7/2025  1:30 PM Yumiko Fernández RD [facility]   5/15/2025 11:30 AM Yumiko Fernández RD [facility]   6/5/2025 11:20 AM Fransisco Smith MD [facility] CARDIO [facility]   6/25/2025 12:00 PM LAB, APPOINTMENT [facility] ORMARTI [facility] LAB [facility]   6/25/2025  1:00 PM Arnel Ang Jr., MD [facility]   6/26/2025  1:30 PM Yumiko Fernández RD [facility]   11/3/2025  7:20 AM LAB, APPOINTMENT [facility] LAB [facility]   11/3/2025  7:30 AM LAB, APPOINTMENT [facility] LAB [facility]   11/3/2025  8:00 AM Phoebe Urena NP [facility]   11/3/2025  8:30 AM Stephanie Stevens RD [facility]     MATT May  Case Management   [facility]   Ext. [phone]

## 2025-05-07 ENCOUNTER — OFFICE VISIT (OUTPATIENT)
Dept: BARIATRICS | Facility: CLINIC | Age: 45
End: 2025-05-07
Payer: COMMERCIAL

## 2025-05-07 ENCOUNTER — CLINICAL SUPPORT (OUTPATIENT)
Dept: BARIATRICS | Facility: CLINIC | Age: 45
End: 2025-05-07
Payer: COMMERCIAL

## 2025-05-07 ENCOUNTER — LAB VISIT (OUTPATIENT)
Dept: LAB | Facility: HOSPITAL | Age: 45
End: 2025-05-07
Attending: SURGERY
Payer: COMMERCIAL

## 2025-05-07 VITALS
BODY MASS INDEX: 44.41 KG/M2 | WEIGHT: 293 LBS | TEMPERATURE: 98 F | SYSTOLIC BLOOD PRESSURE: 140 MMHG | DIASTOLIC BLOOD PRESSURE: 82 MMHG | HEIGHT: 68 IN | OXYGEN SATURATION: 99 % | HEART RATE: 87 BPM

## 2025-05-07 DIAGNOSIS — E66.01 MORBID OBESITY WITH BMI OF 50.0-59.9, ADULT: ICD-10-CM

## 2025-05-07 DIAGNOSIS — Z98.84 S/P BARIATRIC SURGERY: ICD-10-CM

## 2025-05-07 DIAGNOSIS — Z71.3 DIETARY COUNSELING AND SURVEILLANCE: Primary | ICD-10-CM

## 2025-05-07 DIAGNOSIS — Z09 POSTOP CHECK: Primary | ICD-10-CM

## 2025-05-07 DIAGNOSIS — I10 PRIMARY HYPERTENSION: ICD-10-CM

## 2025-05-07 DIAGNOSIS — I10 HYPERTENSION, UNSPECIFIED TYPE: ICD-10-CM

## 2025-05-07 DIAGNOSIS — E66.01 MORBID OBESITY: ICD-10-CM

## 2025-05-07 DIAGNOSIS — K21.00 GASTROESOPHAGEAL REFLUX DISEASE WITH ESOPHAGITIS, UNSPECIFIED WHETHER HEMORRHAGE: ICD-10-CM

## 2025-05-07 LAB
ABSOLUTE EOSINOPHIL (OHS): 0.25 K/UL
ABSOLUTE MONOCYTE (OHS): 0.45 K/UL (ref 0.3–1)
ABSOLUTE NEUTROPHIL COUNT (OHS): 2.39 K/UL (ref 1.8–7.7)
ALBUMIN SERPL BCP-MCNC: 3.3 G/DL (ref 3.5–5.2)
ALP SERPL-CCNC: 60 UNIT/L (ref 40–150)
ALT SERPL W/O P-5'-P-CCNC: 14 UNIT/L (ref 10–44)
ANION GAP (OHS): 12 MMOL/L (ref 8–16)
AST SERPL-CCNC: 24 UNIT/L (ref 11–45)
BASOPHILS # BLD AUTO: 0.03 K/UL
BASOPHILS NFR BLD AUTO: 0.8 %
BILIRUB SERPL-MCNC: 0.6 MG/DL (ref 0.1–1)
BUN SERPL-MCNC: 5 MG/DL (ref 6–20)
CALCIUM SERPL-MCNC: 9.2 MG/DL (ref 8.7–10.5)
CHLORIDE SERPL-SCNC: 100 MMOL/L (ref 95–110)
CO2 SERPL-SCNC: 24 MMOL/L (ref 23–29)
CREAT SERPL-MCNC: 0.7 MG/DL (ref 0.5–1.4)
ERYTHROCYTE [DISTWIDTH] IN BLOOD BY AUTOMATED COUNT: 16.2 % (ref 11.5–14.5)
GFR SERPLBLD CREATININE-BSD FMLA CKD-EPI: >60 ML/MIN/1.73/M2
GLUCOSE SERPL-MCNC: 73 MG/DL (ref 70–110)
HCT VFR BLD AUTO: 41.8 % (ref 37–48.5)
HGB BLD-MCNC: 13.3 GM/DL (ref 12–16)
IMM GRANULOCYTES # BLD AUTO: 0.01 K/UL (ref 0–0.04)
IMM GRANULOCYTES NFR BLD AUTO: 0.3 % (ref 0–0.5)
LYMPHOCYTES # BLD AUTO: 0.78 K/UL (ref 1–4.8)
MCH RBC QN AUTO: 25.7 PG (ref 27–31)
MCHC RBC AUTO-ENTMCNC: 31.8 G/DL (ref 32–36)
MCV RBC AUTO: 81 FL (ref 82–98)
NUCLEATED RBC (/100WBC) (OHS): 0 /100 WBC
PLATELET # BLD AUTO: 192 K/UL (ref 150–450)
PMV BLD AUTO: 11.3 FL (ref 9.2–12.9)
POTASSIUM SERPL-SCNC: 3.5 MMOL/L (ref 3.5–5.1)
PROT SERPL-MCNC: 8.5 GM/DL (ref 6–8.4)
RBC # BLD AUTO: 5.17 M/UL (ref 4–5.4)
RELATIVE EOSINOPHIL (OHS): 6.4 %
RELATIVE LYMPHOCYTE (OHS): 19.9 % (ref 18–48)
RELATIVE MONOCYTE (OHS): 11.5 % (ref 4–15)
RELATIVE NEUTROPHIL (OHS): 61.1 % (ref 38–73)
SODIUM SERPL-SCNC: 136 MMOL/L (ref 136–145)
VIT B12 SERPL-MCNC: 1247 PG/ML (ref 210–950)
WBC # BLD AUTO: 3.91 K/UL (ref 3.9–12.7)

## 2025-05-07 PROCEDURE — 82947 ASSAY GLUCOSE BLOOD QUANT: CPT

## 2025-05-07 PROCEDURE — 85025 COMPLETE CBC W/AUTO DIFF WBC: CPT

## 2025-05-07 PROCEDURE — 99024 POSTOP FOLLOW-UP VISIT: CPT | Mod: S$GLB,COE,, | Performed by: SURGERY

## 2025-05-07 PROCEDURE — 3044F HG A1C LEVEL LT 7.0%: CPT | Mod: CPTII,S$GLB,COE, | Performed by: SURGERY

## 2025-05-07 PROCEDURE — 1159F MED LIST DOCD IN RCRD: CPT | Mod: CPTII,S$GLB,COE, | Performed by: SURGERY

## 2025-05-07 PROCEDURE — 82607 VITAMIN B-12: CPT

## 2025-05-07 PROCEDURE — 36415 COLL VENOUS BLD VENIPUNCTURE: CPT

## 2025-05-07 PROCEDURE — 84425 ASSAY OF VITAMIN B-1: CPT

## 2025-05-07 PROCEDURE — 3077F SYST BP >= 140 MM HG: CPT | Mod: CPTII,S$GLB,COE, | Performed by: SURGERY

## 2025-05-07 PROCEDURE — 3079F DIAST BP 80-89 MM HG: CPT | Mod: CPTII,S$GLB,COE, | Performed by: SURGERY

## 2025-05-07 PROCEDURE — 99999 PR PBB SHADOW E&M-EST. PATIENT-LVL I: CPT | Mod: PBBFAC,COE,, | Performed by: DIETITIAN, REGISTERED

## 2025-05-07 PROCEDURE — 1160F RVW MEDS BY RX/DR IN RCRD: CPT | Mod: CPTII,S$GLB,COE, | Performed by: SURGERY

## 2025-05-07 PROCEDURE — 99999 PR PBB SHADOW E&M-EST. PATIENT-LVL IV: CPT | Mod: PBBFAC,COE,, | Performed by: SURGERY

## 2025-05-07 NOTE — PROGRESS NOTES
NUTRITION NOTE    Referring Physician: Arnel Ang M.D.   Reason for MNT Referral: Follow-up 1 Week s/p laparoscopic Francisco-en-Y gastric bypass    PAST MEDICAL HISTORY:  Denies nausea, vomiting, constipation, and diarrhea.  Reports doing well.    Past Medical History:   Diagnosis Date    Asthma     GERD (gastroesophageal reflux disease)     GERD with esophagitis     Hyperlipidemia     Hypertension     Left ovarian cyst      CLINICAL DATA:  44 y.o.-year-old Black or  female.    Initial wt: 397 lbs  Pre-op weight: 381 lbs  Current Weight: 371 lbs  BMI: 56.45    CURRENT DIET:  Bariatric Liquid Diet    Diet Recall: 0 grams of protein/day; 51 oz of fluids/day    Diet Includes:  Protein Supplements: Premier Premier. PT was not aware she could start drinking protein shakes  Fluids include: water, SF popsicles, SF Jell-O    LABS:  None available at time of visit    VITAMINS/MINERALS:  Barimelt Multivitamin with 18 mg iron, 2 per day  Celebrate Calcium Citrate chews 500 mg with vitamin D, 3 per day   B-1 250 mg, 1 per week  B-12 2500 mcg sublingual, 1 per week       EXERCISE:  Walking     ASSESSMENT:  Doing ok overall.  Inadequate protein intake.  Adequate fluid intake.    BARIATRIC DIET DISCUSSION:  Instructed and provided written materials on bariatric puree, soft and solid diet plans   Bariatric soft diet plan to start in 3 weeks as tolerated  Bariatric solid diet plan to start in 7 weeks as tolerated  Pt may swallow tablets and pills at 2 week post op   Reinforced post-op nutrition guidelines.    PLAN/RECOMMONDATIONS:  Continue on full liquid diet  Advance to bariatric puree diet in one week  Increase protein intake.  Increase fluid intake.  Continue light exercise.  Begin appropriate vitamins & minerals.    SESSION TIME: 30 minutes

## 2025-05-07 NOTE — PROGRESS NOTES
"  BARIATRIC POST-OPERATIVE FOLLOW UP:    HPI:  44 y.o. female presents for one week fu from LRYGB.  Doing well.  Pain improving, rabia po without issue,  Hasn't started shakes yet.  WIll begin.  Getting around well.    Denies: nausea, vomiting, abdominal pain, changes in bowel movement pattern, fever, chills, dysphagia, chest pain, and shortness of breath.    PHYSICAL EXAM:  BP (!) 140/82 (BP Location: Right forearm, Patient Position: Sitting)   Pulse 87   Temp 97.9 °F (36.6 °C) (Oral)   Ht 5' 8" (1.727 m)   Wt (!) 168.4 kg (371 lb 4.1 oz)   LMP 04/05/2025   SpO2 99%   BMI 56.45 kg/m²      Weight History Current Weight Total Weight Loss   5/7/2025   1:02  lbs -371 lbs       GENERAL: In NAD, A&O x3  ABDOMEN: Soft, non-tender, non-distended. Well-healing surgical scars are clean, dry, and intact without signs of infection or bleeding.  EXTREMITIES: No clubbing, cyanosis, or edema.      ASSESSMENT:  - Morbid obesity s/p laparoscopic Francisco-en-Y  one week ago.    PLAN:  - Emphasized the importance of regular exercise and adherence to bariatric diet to achieve maximum weight loss.  - Encouraged patient to begin OR continue regular exercise.  - Follow-up with dietician to reinforce diet.  - Continue daily vitamins and medications.  - Call the office for any issues.  "

## 2025-05-09 LAB
DHEA SERPL-MCNC: NORMAL
ESTROGEN SERPL-MCNC: NORMAL PG/ML
INSULIN SERPL-ACNC: NORMAL U[IU]/ML
LAB AP CLINICAL INFORMATION: NORMAL
LAB AP GROSS DESCRIPTION: NORMAL
LAB AP PERFORMING LOCATION(S): NORMAL
LAB AP REPORT FOOTNOTES: NORMAL
T3RU NFR SERPL: NORMAL %

## 2025-05-13 ENCOUNTER — RESULTS FOLLOW-UP (OUTPATIENT)
Dept: SURGERY | Facility: CLINIC | Age: 45
End: 2025-05-13

## 2025-05-14 ENCOUNTER — RESULTS FOLLOW-UP (OUTPATIENT)
Dept: SURGERY | Facility: HOSPITAL | Age: 45
End: 2025-05-14

## 2025-05-14 LAB — W VITAMIN B1: 31 UG/L

## 2025-05-15 ENCOUNTER — CLINICAL SUPPORT (OUTPATIENT)
Dept: BARIATRICS | Facility: CLINIC | Age: 45
End: 2025-05-15
Payer: COMMERCIAL

## 2025-05-15 DIAGNOSIS — Z71.3 DIETARY COUNSELING AND SURVEILLANCE: Primary | ICD-10-CM

## 2025-05-15 DIAGNOSIS — E66.01 MORBID OBESITY WITH BMI OF 50.0-59.9, ADULT: ICD-10-CM

## 2025-05-15 DIAGNOSIS — Z98.84 S/P BARIATRIC SURGERY: ICD-10-CM

## 2025-05-15 NOTE — PROGRESS NOTES
Audio Only Telehealth Visit     The patient location is: [place]  The chief complaint leading to consultation is: Follow-up 2 Weeks s/p laparoscopic Francisco-en-Y gastric bypass  Visit type: Virtual visit with audio only (telephone)  Total time spent with patient: 15 mins     The reason for the audio only service rather than synchronous audio and video virtual visit was related to technical difficulties or patient preference/necessity.     Each patient to whom I provide medical services by telemedicine is:  (1) informed of the relationship between the physician and patient and the respective role of any other health care provider with respect to management of the patient; and (2) notified that they may decline to receive medical services by telemedicine and may withdraw from such care at any time. Patient verbally consented to receive this service via voice-only telephone call.    This service was not originating from a related E/M service provided within the previous 7 days nor will  to an E/M service or procedure within the next 24 hours or my soonest available appointment.  Prevailing standard of care was able to be met in this audio-only visit.      NUTRITION NOTE    Referring Physician: Arnel Ang M.D.   Reason for MNT Referral: Follow-up 2 Weeks s/p laparoscopic Francisco-en-Y gastric bypass    PAST MEDICAL HISTORY:  Denies nausea, vomiting, constipation, and diarrhea.  Reports doing well.    Past Medical History:   Diagnosis Date    Asthma     GERD (gastroesophageal reflux disease)     GERD with esophagitis     Hyperlipidemia     Hypertension     Left ovarian cyst        CLINICAL DATA:  44 y.o.-year-old Black or  female.    Pre-op wt: 381  Current Weight: NA lbs  BMI: NA    CURRENT DIET:  Bariatric Liquid Diet    Diet Recall: 50 grams of protein/day; 40 oz of fluids/day    Diet Includes:  Protein Supplements: Premier Premier  Fluids include: Water, [employer] zero sugar juice, SF popsicles, SF  Tana    PT states yesterday she tried Greek yogurt (Chobani 20 g pro), scrambled egg, pureed mustard greens and was able to tolerate    EXERCISE:  Walking     LABS:  Reviewed.  Low Vit D  Low B-1    VITAMINS/MINERALS:  Barimelt Multivitamin with 18 mg iron, 2 per day  Celebrate Calcium Citrate chews 500 mg with vitamin D, 3 per day   B-1 250 mg, 1 per week  B-12 2500 mcg sublingual, 1 per week       ASSESSMENT:  Doing ok overall.  Adequate protein intake.  Inadequate fluid intake.    BARIATRIC DIET DISCUSSION:  Bariatric soft diet plan to start in 2 weeks as rabia  Pt may swallow tablets and pills at 2 week post op   Reinforced post-op nutrition guidelines.  Reminded PT that yogurt should be under 10 g sugar    PLAN/RECOMMONDATIONS:  Advance to bariatric puree diet  Increase protein intake.  Increase fluid intake.  Continue light exercise.  Continue appropriate vitamins & minerals.  Adjust vitamins & minerals by: Increasing B-1 to 3 x week, recheck at 8 wk post op appt    Return to clinic in 6 weeks.    SESSION TIME: 20 minutes

## 2025-05-16 ENCOUNTER — PATIENT MESSAGE (OUTPATIENT)
Dept: BARIATRICS | Facility: CLINIC | Age: 45
End: 2025-05-16
Payer: COMMERCIAL

## 2025-05-21 ENCOUNTER — TELEPHONE (OUTPATIENT)
Dept: SURGERY | Facility: CLINIC | Age: 45
End: 2025-05-21
Payer: COMMERCIAL

## 2025-05-21 NOTE — TELEPHONE ENCOUNTER
----- Message from Shannan sent at 5/21/2025 11:04 AM CDT -----  Regarding: Orders  Contact: [phone]  Type:  Needs Medical AdviceWho Called: Marti called from [employer] the pt employer  in regards to paperwork concerning pt surgery says she sent in a request and received no repsnse Would the patient rather a call back or a response via [facility]? Best Call Back Number: [phone] Fax [phone] Information:

## 2025-05-22 ENCOUNTER — HOSPITAL ENCOUNTER (EMERGENCY)
Facility: HOSPITAL | Age: 45
Discharge: HOME | End: 2025-05-22
Attending: EMERGENCY MEDICINE
Payer: COMMERCIAL

## 2025-05-22 ENCOUNTER — TELEPHONE (OUTPATIENT)
Dept: BARIATRICS | Facility: CLINIC | Age: 45
End: 2025-05-22

## 2025-05-22 VITALS
BODY MASS INDEX: 44.41 KG/M2 | WEIGHT: 293 LBS | TEMPERATURE: 98 F | OXYGEN SATURATION: 99 % | HEART RATE: 75 BPM | DIASTOLIC BLOOD PRESSURE: 91 MMHG | HEIGHT: 68 IN | RESPIRATION RATE: 16 BRPM | SYSTOLIC BLOOD PRESSURE: 145 MMHG

## 2025-05-22 DIAGNOSIS — M79.606 LEG PAIN: ICD-10-CM

## 2025-05-22 DIAGNOSIS — M25.559 HIP PAIN: ICD-10-CM

## 2025-05-22 LAB
ABSOLUTE EOSINOPHIL (OHS): 0.23 K/UL
ABSOLUTE MONOCYTE (OHS): 0.44 K/UL (ref 0.3–1)
ABSOLUTE NEUTROPHIL COUNT (OHS): 2.35 K/UL (ref 1.8–7.7)
ALBUMIN SERPL BCP-MCNC: 3.3 G/DL (ref 3.5–5.2)
ALP SERPL-CCNC: 63 UNIT/L (ref 40–150)
ALT SERPL W/O P-5'-P-CCNC: 16 UNIT/L (ref 10–44)
ANION GAP (OHS): 9 MMOL/L (ref 8–16)
APTT PPP: 29.4 SECONDS (ref 21–32)
AST SERPL-CCNC: 27 UNIT/L (ref 11–45)
BASOPHILS # BLD AUTO: 0.03 K/UL
BASOPHILS NFR BLD AUTO: 0.8 %
BILIRUB SERPL-MCNC: 0.5 MG/DL (ref 0.1–1)
BUN SERPL-MCNC: 7 MG/DL (ref 6–20)
CALCIUM SERPL-MCNC: 9.2 MG/DL (ref 8.7–10.5)
CHLORIDE SERPL-SCNC: 107 MMOL/L (ref 95–110)
CK SERPL-CCNC: 125 U/L (ref 20–180)
CO2 SERPL-SCNC: 24 MMOL/L (ref 23–29)
CREAT SERPL-MCNC: 0.8 MG/DL (ref 0.5–1.4)
ERYTHROCYTE [DISTWIDTH] IN BLOOD BY AUTOMATED COUNT: 17.5 % (ref 11.5–14.5)
GFR SERPLBLD CREATININE-BSD FMLA CKD-EPI: >60 ML/MIN/1.73/M2
GLUCOSE SERPL-MCNC: 87 MG/DL (ref 70–110)
HCT VFR BLD AUTO: 38.8 % (ref 37–48.5)
HCV AB SERPL QL IA: NORMAL
HGB BLD-MCNC: 12.5 GM/DL (ref 12–16)
HIV 1+2 AB+HIV1 P24 AG SERPL QL IA: NORMAL
HOLD SPECIMEN: NORMAL
IMM GRANULOCYTES # BLD AUTO: 0 K/UL (ref 0–0.04)
IMM GRANULOCYTES NFR BLD AUTO: 0 % (ref 0–0.5)
INR PPP: 1.1 (ref 0.8–1.2)
LYMPHOCYTES # BLD AUTO: 0.79 K/UL (ref 1–4.8)
MCH RBC QN AUTO: 26.9 PG (ref 27–31)
MCHC RBC AUTO-ENTMCNC: 32.2 G/DL (ref 32–36)
MCV RBC AUTO: 83 FL (ref 82–98)
NUCLEATED RBC (/100WBC) (OHS): 0 /100 WBC
PLATELET # BLD AUTO: 183 K/UL (ref 150–450)
PMV BLD AUTO: 11.1 FL (ref 9.2–12.9)
POTASSIUM SERPL-SCNC: 3.6 MMOL/L (ref 3.5–5.1)
PROT SERPL-MCNC: 8.2 GM/DL (ref 6–8.4)
PROTHROMBIN TIME: 12.1 SECONDS (ref 9–12.5)
RBC # BLD AUTO: 4.65 M/UL (ref 4–5.4)
RELATIVE EOSINOPHIL (OHS): 6 %
RELATIVE LYMPHOCYTE (OHS): 20.6 % (ref 18–48)
RELATIVE MONOCYTE (OHS): 11.5 % (ref 4–15)
RELATIVE NEUTROPHIL (OHS): 61.1 % (ref 38–73)
SODIUM SERPL-SCNC: 140 MMOL/L (ref 136–145)
WBC # BLD AUTO: 3.84 K/UL (ref 3.9–12.7)

## 2025-05-22 PROCEDURE — 99285 EMERGENCY DEPT VISIT HI MDM: CPT | Mod: 25

## 2025-05-22 PROCEDURE — 86803 HEPATITIS C AB TEST: CPT | Performed by: PHYSICIAN ASSISTANT

## 2025-05-22 PROCEDURE — 82040 ASSAY OF SERUM ALBUMIN: CPT | Performed by: EMERGENCY MEDICINE

## 2025-05-22 PROCEDURE — 85025 COMPLETE CBC W/AUTO DIFF WBC: CPT | Performed by: EMERGENCY MEDICINE

## 2025-05-22 PROCEDURE — 25000003 PHARM REV CODE 250: Performed by: EMERGENCY MEDICINE

## 2025-05-22 PROCEDURE — 82550 ASSAY OF CK (CPK): CPT | Performed by: EMERGENCY MEDICINE

## 2025-05-22 PROCEDURE — 85730 THROMBOPLASTIN TIME PARTIAL: CPT | Performed by: EMERGENCY MEDICINE

## 2025-05-22 PROCEDURE — 87389 HIV-1 AG W/HIV-1&-2 AB AG IA: CPT | Performed by: PHYSICIAN ASSISTANT

## 2025-05-22 PROCEDURE — 85610 PROTHROMBIN TIME: CPT | Performed by: EMERGENCY MEDICINE

## 2025-05-22 RX ORDER — LIDOCAINE 50 MG/G
1 PATCH TOPICAL
Status: DISCONTINUED | OUTPATIENT
Start: 2025-05-22 | End: 2025-05-22 | Stop reason: HOSPADM

## 2025-05-22 RX ORDER — ACETAMINOPHEN 325 MG/1
650 TABLET ORAL
Status: COMPLETED | OUTPATIENT
Start: 2025-05-22 | End: 2025-05-22

## 2025-05-22 RX ORDER — LIDOCAINE 50 MG/G
1 PATCH TOPICAL DAILY PRN
Qty: 15 PATCH | Refills: 0 | Status: SHIPPED | OUTPATIENT
Start: 2025-05-22

## 2025-05-22 RX ADMIN — ACETAMINOPHEN 650 MG: 325 TABLET ORAL at 11:05

## 2025-05-22 RX ADMIN — LIDOCAINE 1 PATCH: 50 PATCH CUTANEOUS at 11:05

## 2025-05-22 NOTE — ED TRIAGE NOTES
Pt reports LLE pain starting last night, pt denies any recent trauma. Pt reports having gastric bypass on 5/1/25

## 2025-05-22 NOTE — ED PROVIDER NOTES
Encounter Date: 5/22/2025       History     Chief Complaint   Patient presents with    Leg Pain     Left leg pain, states gastric bypass this month and wants to make sure its not a blood clot, not on blood thinners      45 yo female presenting with left upper leg pain.    PMH: GERD, hyperlipidemia, hypertension, asthma    Patient states she had bariatric surgery at the beginning of this month.  She is doing well recovering from the surgery.  She began having left upper leg pain starting last night.  Denies fall or direct injury.  She states she thinks she might have slept on that side and that her children sleep in the bed with her so she was not able to turn to take the pressure off.  Pain is worse with movement.  She has been able to walk and bear weight on her leg.  She is primarily concerned for a blood clot.  She took some prescribed Tylenol and use BenGay cream with some relief.  She denies chest pain or shortness of breath.  The pain is around her left upper leg and hip. No back pain.     The history is provided by the patient and medical records. No  was used.     Review of patient's allergies indicates:   Allergen Reactions    Penicillins Hives     Past Medical History:   Diagnosis Date    Asthma     GERD (gastroesophageal reflux disease)     GERD with esophagitis     Hyperlipidemia     Hypertension     Left ovarian cyst      Past Surgical History:   Procedure Laterality Date    c sections      endometrial polypectomy      ESOPHAGOGASTRODUODENOSCOPY N/A 09/21/2020    Procedure: EGD (ESOPHAGOGASTRODUODENOSCOPY);  Surgeon: Dom Mckeon MD;  Location: [facility];  Service: Endoscopy;  Laterality: N/A;    LAPAROSCOPIC GASTROENTEROSTOMY N/A 5/1/2025    Procedure: GASTROENTEROSTOMY, LAPAROSCOPIC W/ INTRA OP EGD +/-HHR;  Surgeon: Arnel Ang Jr., MD;  Location: [address];  Service: General;  Laterality: N/A;  TONE DOTTIEUM PT  BMI              [employer] REP NOTIFIED     LIVER BIOPSY N/A 5/1/2025    Procedure: BIOPSY, LIVER;  Surgeon: Arnel Ang Jr., MD;  Location: [facility] OR [address] [place] [address];  Service: General;  Laterality: N/A;    TUBAL LIGATION       Family History   Problem Relation Name Age of Onset    Diabetes Father      Hypertension Father      Hypertension Mother       Social History[1]      Physical Exam     Initial Vitals [05/22/25 1035]   BP Pulse Resp Temp SpO2   (!) 138/92 98 18 98.7 °F (37.1 °C) 95 %      MAP       --         Physical Exam    Nursing note and vitals reviewed.  Constitutional: She is not diaphoretic. No distress.   HENT:   Head: Normocephalic and atraumatic.   Eyes: Right eye exhibits no discharge. Left eye exhibits no discharge.   Neck: Neck supple. No tracheal deviation present.   Cardiovascular:  Normal rate and regular rhythm.           Pulmonary/Chest: Breath sounds normal. No respiratory distress.   Abdominal: Abdomen is soft. There is no abdominal tenderness.   Musculoskeletal:      Cervical back: Neck supple.      Comments: Left lower extremity:  No vesicular rash or evidence of trauma  SILT  Full range of motion and strength at hip, knee, ankle, no overlying erythema or effusion  No asymmetry compared to right leg  2+ DP  No calf tenderness      Neurological: She is alert and oriented to person, place, and time.   Skin: Skin is warm. No rash noted.   Psychiatric: She has a normal mood and affect. Her behavior is normal.         ED Course   Procedures  Labs Reviewed   COMPREHENSIVE METABOLIC PANEL - Abnormal       Result Value    Sodium 140      Potassium 3.6      Chloride 107      CO2 24      Glucose 87      BUN 7      Creatinine 0.8      Calcium 9.2      Protein Total 8.2      Albumin 3.3 (*)     Bilirubin Total 0.5      ALP 63      AST 27      ALT 16      Anion Gap 9      eGFR >60     CBC WITH DIFFERENTIAL - Abnormal    WBC 3.84 (*)     RBC 4.65      HGB 12.5      HCT 38.8      MCV 83      MCH 26.9 (*)     MCHC 32.2      RDW 17.5 (*)      Platelet Count 183      MPV 11.1      Nucleated RBC 0      Neut % 61.1      Lymph % 20.6      Mono % 11.5      Eos % 6.0      Basophil % 0.8      Imm Grans % 0.0      Neut # 2.35      Lymph # 0.79 (*)     Mono # 0.44      Eos # 0.23      Baso # 0.03      Imm Grans # 0.00     PROTIME-INR - Normal    PT 12.1      INR 1.1     APTT - Normal    PTT 29.4     CK - Normal         HEPATITIS C ANTIBODY - Normal    Hep C Ab Interp Non-Reactive     HIV 1 / 2 ANTIBODY - Normal    HIV 1/2 Ag/Ab Non-Reactive     CBC W/ AUTO DIFFERENTIAL    Narrative:     The following orders were created for panel order CBC auto differential.  Procedure                               Abnormality         Status                     ---------                               -----------         ------                     CBC with Differential[[id number]]       Abnormal            Final result                 Please view results for these tests on the individual orders.   HEP C VIRUS HOLD SPECIMEN    Extra Tube Hold for add-ons.            Imaging Results              US Lower Extremity Veins Left (Final result)  Result time 05/22/25 15:20:10      Final result by Brandon Harris Jr., MD (05/22/25 15:20:10)                   Impression:      No evidence of deep venous thrombosis in the left lower extremity.      Electronically signed by: Brandon Acevedo Jr  Date:    05/22/2025  Time:    15:20               Narrative:    EXAMINATION:  US LOWER EXTREMITY VEINS LEFT    CLINICAL HISTORY:  Pain in leg, unspecified    TECHNIQUE:  Duplex and color flow Doppler evaluation and graded compression of the left lower extremity veins was performed.    COMPARISON:  None    FINDINGS:  Left thigh veins: The common femoral, femoral, popliteal, upper greater saphenous, and deep femoral veins are patent and free of thrombus. The veins are normally compressible and have normal phasic flow and augmentation response.    Left calf veins: The visualized calf veins  are patent.    Contralateral CFV: The contralateral (right) common femoral vein is patent and free of thrombus.    Miscellaneous: None                                       X-Ray Hip 2 or 3 views Left with Pelvis when performed (Final result)  Result time 05/22/25 12:49:31      Final result by Russ Moon MD (05/22/25 12:49:31)                   Impression:      1. No acute displaced fracture or dislocation of the left hip.      Electronically signed by: Russ Moon MD  Date:    05/22/2025  Time:    12:49               Narrative:    EXAMINATION:  XR HIP WITH PELVIS WHEN PERFORMED 2 OR 3 VIEWS LEFT    CLINICAL HISTORY:  Pain in unspecified hip    TECHNIQUE:  AP view of the pelvis and frog leg lateral view of the left hip were performed.    COMPARISON:  None    FINDINGS:  Three views left hip.    The bilateral femoroacetabular joints are intact.  The sacroiliac joints are intact.  The pubic symphysis is intact.                                       Medications   acetaminophen tablet 650 mg (650 mg Oral Given 5/22/25 1128)     Medical Decision Making  44-year-old female presenting with atraumatic left upper leg pain.  On arrival, she is afebrile, left lower extremity is neurovascularly intact.  Plan for Tylenol and Lidoderm patch for pain (offered liquid tylenol, but she states she has been taking pills at home and is ok with a pill).    Differential including, but not limited to:  DVT, hip fracture or dislocation, muscle strain, arthritis  No e/o cellulitis, doubt septic arthritis   No e/o acute ischemic limb     Pursued ultrasound to rule out DVT  - negative.  Patient reassured. Continue to avoid NSAIDs.  Suspect MSK etiology, can use heat.  Had relief with lidocaine patch, will prescribe for continued use at home.  F/u with bariatric surgery clinic and PCP.      Patient safe for discharge and outpatient follow up.  Advised scheduling appointment with PCP and return precautions given.  The patient  understands and agrees with the plan.  All questions answered prior to discharge.         Amount and/or Complexity of Data Reviewed  External Data Reviewed: notes.     Details: Discharge summary from earlier this month reviewed.  S/p GASTROENTEROSTOMY, LAPAROSCOPIC W/ INTRA OP EGD +/-HHR (N/A)  BIOPSY, LIVER (N/A)     Labs: ordered. Decision-making details documented in ED Course.  Radiology: ordered and independent interpretation performed.    Risk  OTC drugs.  Prescription drug management.               ED Course as of 25   Thu May 22, 2025   115 Creatinine: 0.8  Normal renal function [AB]   1158 Hemoglobin: 12.5  No anemia [AB]   1159 WBC(!): 3.84 [AB]   1159 CPK: 125  Normal [AB]      ED Course User Index  [AB] Trevor Sunshine MD                           Clinical Impression:  Final diagnoses:  [M79.606] Leg pain  [M25.559] Hip pain          ED Disposition Condition    Discharge Stable          ED Prescriptions       Medication Sig Dispense Start Date End Date Auth. Provider    LIDOcaine (LIDODERM) 5 % Place 1 patch onto the skin daily as needed (leg pain). Remove & Discard patch within 12 hours or as directed by MD 15 patch 2025 -- Trevor Sunshine MD          Follow-up Information       Follow up With Specialties Details Why Contact [facility], [facility] Behavioral Health, Psychiatry, Family Medicine In 3 days  [address] [facility]  [place] [address]  [phone]      [facility] - Emergency Dept Emergency Medicine  As needed, If symptoms worsen [address] [facility] [address] [phone]                   [1]   Social History  Tobacco Use    Smoking status: Former     Current packs/day: 0.00     Types: Cigarettes     Quit date: 2020     Years since quittin.8    Smokeless tobacco: Never   Substance Use Topics    Alcohol use: Not Currently    Drug use: No        Trevor Sunshine MD  25

## 2025-05-22 NOTE — TELEPHONE ENCOUNTER
----- Message from Myrtle sent at 5/22/2025  1:39 PM CDT -----  Contact: pt @ [phone]  HERIBERTO JENKINS calling regarding Patient Advice (message) for #pt returning call from Yumiko, asking for call back

## 2025-05-30 ENCOUNTER — PATIENT MESSAGE (OUTPATIENT)
Dept: BARIATRICS | Facility: CLINIC | Age: 45
End: 2025-05-30
Payer: COMMERCIAL

## 2025-06-20 ENCOUNTER — PATIENT MESSAGE (OUTPATIENT)
Dept: BARIATRICS | Facility: CLINIC | Age: 45
End: 2025-06-20
Payer: COMMERCIAL

## 2025-06-20 ENCOUNTER — TELEPHONE (OUTPATIENT)
Dept: BARIATRICS | Facility: CLINIC | Age: 45
End: 2025-06-20
Payer: COMMERCIAL

## 2025-06-20 DIAGNOSIS — E66.01 MORBID OBESITY WITH BMI OF 50.0-59.9, ADULT: ICD-10-CM

## 2025-06-20 DIAGNOSIS — Z98.84 S/P BARIATRIC SURGERY: ICD-10-CM

## 2025-06-20 DIAGNOSIS — Z09 POSTOP CHECK: ICD-10-CM

## 2025-06-20 DIAGNOSIS — R74.8 ELEVATED LIVER ENZYMES: Primary | ICD-10-CM

## 2025-06-20 NOTE — TELEPHONE ENCOUNTER
----- Message from Arnel Ang MD sent at 6/18/2025  3:54 PM CDT -----  Can we get a CT abd pelvis with bypass po and IV contrast.  Her liver biopsy done in the OR was not concerning but want to FU to complete eval.  Thanks.

## 2025-06-25 ENCOUNTER — LAB VISIT (OUTPATIENT)
Dept: LAB | Facility: HOSPITAL | Age: 45
End: 2025-06-25
Attending: SURGERY
Payer: COMMERCIAL

## 2025-06-25 ENCOUNTER — OFFICE VISIT (OUTPATIENT)
Dept: BARIATRICS | Facility: CLINIC | Age: 45
End: 2025-06-25
Payer: COMMERCIAL

## 2025-06-25 ENCOUNTER — CLINICAL SUPPORT (OUTPATIENT)
Dept: BARIATRICS | Facility: CLINIC | Age: 45
End: 2025-06-25
Payer: COMMERCIAL

## 2025-06-25 VITALS
TEMPERATURE: 98 F | BODY MASS INDEX: 44.41 KG/M2 | SYSTOLIC BLOOD PRESSURE: 130 MMHG | HEIGHT: 68 IN | HEART RATE: 95 BPM | OXYGEN SATURATION: 94 % | DIASTOLIC BLOOD PRESSURE: 84 MMHG | WEIGHT: 293 LBS

## 2025-06-25 DIAGNOSIS — I10 PRIMARY HYPERTENSION: ICD-10-CM

## 2025-06-25 DIAGNOSIS — E66.01 MORBID OBESITY WITH BMI OF 50.0-59.9, ADULT: ICD-10-CM

## 2025-06-25 DIAGNOSIS — Z71.3 DIETARY COUNSELING AND SURVEILLANCE: Primary | ICD-10-CM

## 2025-06-25 DIAGNOSIS — Z98.84 HISTORY OF ROUX-EN-Y GASTRIC BYPASS: ICD-10-CM

## 2025-06-25 DIAGNOSIS — E66.01 MORBID OBESITY: ICD-10-CM

## 2025-06-25 DIAGNOSIS — I10 HYPERTENSION, UNSPECIFIED TYPE: ICD-10-CM

## 2025-06-25 DIAGNOSIS — Z98.890 POST-OPERATIVE STATE: ICD-10-CM

## 2025-06-25 DIAGNOSIS — Z98.84 S/P BARIATRIC SURGERY: ICD-10-CM

## 2025-06-25 DIAGNOSIS — K21.00 GASTROESOPHAGEAL REFLUX DISEASE WITH ESOPHAGITIS, UNSPECIFIED WHETHER HEMORRHAGE: ICD-10-CM

## 2025-06-25 DIAGNOSIS — R63.4 WEIGHT LOSS: Primary | ICD-10-CM

## 2025-06-25 LAB
25(OH)D3+25(OH)D2 SERPL-MCNC: 23 NG/ML (ref 30–96)
ABSOLUTE EOSINOPHIL (OHS): 0.26 K/UL
ABSOLUTE MONOCYTE (OHS): 0.52 K/UL (ref 0.3–1)
ABSOLUTE NEUTROPHIL COUNT (OHS): 2.19 K/UL (ref 1.8–7.7)
ALBUMIN SERPL BCP-MCNC: 3.8 G/DL (ref 3.5–5.2)
ALP SERPL-CCNC: 68 UNIT/L (ref 40–150)
ALT SERPL W/O P-5'-P-CCNC: 21 UNIT/L (ref 10–44)
ANION GAP (OHS): 11 MMOL/L (ref 8–16)
AST SERPL-CCNC: 26 UNIT/L (ref 11–45)
BASOPHILS # BLD AUTO: 0.04 K/UL
BASOPHILS NFR BLD AUTO: 1 %
BILIRUB SERPL-MCNC: 0.6 MG/DL (ref 0.1–1)
BUN SERPL-MCNC: 11 MG/DL (ref 6–20)
CALCIUM SERPL-MCNC: 10.1 MG/DL (ref 8.7–10.5)
CHLORIDE SERPL-SCNC: 104 MMOL/L (ref 95–110)
CHOLEST SERPL-MCNC: 161 MG/DL (ref 120–199)
CHOLEST/HDLC SERPL: 4.4 {RATIO} (ref 2–5)
CO2 SERPL-SCNC: 26 MMOL/L (ref 23–29)
CREAT SERPL-MCNC: 0.7 MG/DL (ref 0.5–1.4)
ERYTHROCYTE [DISTWIDTH] IN BLOOD BY AUTOMATED COUNT: 17.7 % (ref 11.5–14.5)
GFR SERPLBLD CREATININE-BSD FMLA CKD-EPI: >60 ML/MIN/1.73/M2
GLUCOSE SERPL-MCNC: 94 MG/DL (ref 70–110)
HCT VFR BLD AUTO: 39.7 % (ref 37–48.5)
HDLC SERPL-MCNC: 37 MG/DL (ref 40–75)
HDLC SERPL: 23 % (ref 20–50)
HGB BLD-MCNC: 12.8 GM/DL (ref 12–16)
IMM GRANULOCYTES # BLD AUTO: 0.01 K/UL (ref 0–0.04)
IMM GRANULOCYTES NFR BLD AUTO: 0.2 % (ref 0–0.5)
IRON SATN MFR SERPL: 14 % (ref 20–50)
IRON SERPL-MCNC: 58 UG/DL (ref 30–160)
LDLC SERPL CALC-MCNC: 107.2 MG/DL (ref 63–159)
LYMPHOCYTES # BLD AUTO: 0.99 K/UL (ref 1–4.8)
MCH RBC QN AUTO: 27 PG (ref 27–31)
MCHC RBC AUTO-ENTMCNC: 32.2 G/DL (ref 32–36)
MCV RBC AUTO: 84 FL (ref 82–98)
NONHDLC SERPL-MCNC: 124 MG/DL
NUCLEATED RBC (/100WBC) (OHS): 0 /100 WBC
PLATELET # BLD AUTO: 186 K/UL (ref 150–450)
PMV BLD AUTO: 12 FL (ref 9.2–12.9)
POTASSIUM SERPL-SCNC: 3.7 MMOL/L (ref 3.5–5.1)
PROT SERPL-MCNC: 9 GM/DL (ref 6–8.4)
RBC # BLD AUTO: 4.74 M/UL (ref 4–5.4)
RELATIVE EOSINOPHIL (OHS): 6.5 %
RELATIVE LYMPHOCYTE (OHS): 24.7 % (ref 18–48)
RELATIVE MONOCYTE (OHS): 13 % (ref 4–15)
RELATIVE NEUTROPHIL (OHS): 54.6 % (ref 38–73)
SODIUM SERPL-SCNC: 141 MMOL/L (ref 136–145)
TIBC SERPL-MCNC: 420 UG/DL (ref 250–450)
TRANSFERRIN SERPL-MCNC: 284 MG/DL (ref 200–375)
TRIGL SERPL-MCNC: 84 MG/DL (ref 30–150)
VIT B12 SERPL-MCNC: 629 PG/ML (ref 210–950)
WBC # BLD AUTO: 4.01 K/UL (ref 3.9–12.7)

## 2025-06-25 PROCEDURE — 3075F SYST BP GE 130 - 139MM HG: CPT | Mod: CPTII,S$GLB,COE, | Performed by: PHYSICIAN ASSISTANT

## 2025-06-25 PROCEDURE — 82607 VITAMIN B-12: CPT

## 2025-06-25 PROCEDURE — 80061 LIPID PANEL: CPT

## 2025-06-25 PROCEDURE — 84425 ASSAY OF VITAMIN B-1: CPT

## 2025-06-25 PROCEDURE — 99999 PR PBB SHADOW E&M-EST. PATIENT-LVL I: CPT | Mod: PBBFAC,COE,, | Performed by: DIETITIAN, REGISTERED

## 2025-06-25 PROCEDURE — 80053 COMPREHEN METABOLIC PANEL: CPT

## 2025-06-25 PROCEDURE — 99999 PR PBB SHADOW E&M-EST. PATIENT-LVL III: CPT | Mod: PBBFAC,COE,, | Performed by: PHYSICIAN ASSISTANT

## 2025-06-25 PROCEDURE — 36415 COLL VENOUS BLD VENIPUNCTURE: CPT

## 2025-06-25 PROCEDURE — 3079F DIAST BP 80-89 MM HG: CPT | Mod: CPTII,S$GLB,COE, | Performed by: PHYSICIAN ASSISTANT

## 2025-06-25 PROCEDURE — 99024 POSTOP FOLLOW-UP VISIT: CPT | Mod: S$GLB,COE,, | Performed by: PHYSICIAN ASSISTANT

## 2025-06-25 PROCEDURE — 1159F MED LIST DOCD IN RCRD: CPT | Mod: CPTII,S$GLB,COE, | Performed by: PHYSICIAN ASSISTANT

## 2025-06-25 PROCEDURE — 1160F RVW MEDS BY RX/DR IN RCRD: CPT | Mod: CPTII,S$GLB,COE, | Performed by: PHYSICIAN ASSISTANT

## 2025-06-25 PROCEDURE — 84466 ASSAY OF TRANSFERRIN: CPT

## 2025-06-25 PROCEDURE — 3044F HG A1C LEVEL LT 7.0%: CPT | Mod: CPTII,S$GLB,COE, | Performed by: PHYSICIAN ASSISTANT

## 2025-06-25 PROCEDURE — 82306 VITAMIN D 25 HYDROXY: CPT

## 2025-06-25 PROCEDURE — 85025 COMPLETE CBC W/AUTO DIFF WBC: CPT

## 2025-06-25 RX ORDER — URSODIOL 500 MG/1
500 TABLET, FILM COATED ORAL DAILY
Qty: 180 TABLET | Refills: 0 | Status: SHIPPED | OUTPATIENT
Start: 2025-06-25 | End: 2025-12-22

## 2025-06-25 NOTE — PROGRESS NOTES
BARIATRIC FOLLOW UP:    Chief Complaint   Patient presents with    Post-op Evaluation       HISTORY OF PRESENT ILLNESS: Elva Caceres is a 44 y.o. female with a Body mass index is 53 kg/m². who presents for follow up s/p LRNY with Dr Ang on 5/1/2025.  she is doing well and tolerating the diet without difficulty.  she is losing weight appropriately.  Works in customer service.    Post Discharge: 8 Weeks     Total Opioids Used (Outpatient): 0 tabsml: mLs  Unused Opioids Returned: N/A Educated on safe opioid disposal location at [facility] outpatient pharmacy.   Additional Opioids Provided: no      Review of Systems   Constitutional:  Negative for chills, fever and weight loss.   Eyes:  Negative for blurred vision, double vision and pain.   Respiratory:  Negative for cough, shortness of breath and wheezing.    Cardiovascular:  Negative for chest pain, palpitations and leg swelling.   Gastrointestinal:  Negative for abdominal pain, blood in stool, constipation, diarrhea, heartburn, melena, nausea and vomiting.   Genitourinary:  Negative for dysuria, frequency and hematuria.   Skin:  Negative for itching and rash.   Neurological:  Negative for headaches.   Psychiatric/Behavioral:  Negative for depression and suicidal ideas.        EXERCISE: Exercise- no formal activity.  Good daily activity  VITAMINS: Adherent with bariatric vitamin regimen: Chewable bariatric MVI with iron. Separate Thiamine 3x's/ week.  Calcium citrate chew: 3 daily.     MEDICATIONS/ALLERGIES:  Have been reviewed.    DIET:  Soft Bariatric Diet.  2 protein water and 2-3 small meals daily, ~60 grams protein.  48 oz H20 and Clear SF Liquids.      Vitals:    06/25/25 0950   BP: 130/84   Pulse: 95   Temp: 97.9 °F (36.6 °C)       Physical Exam  Vitals and nursing note reviewed.   Constitutional:       General: She is not in acute distress.     Appearance: She is well-developed. She is obese. She is not ill-appearing, toxic-appearing or diaphoretic.    HENT:      Head: Normocephalic and atraumatic.   Eyes:      General: No scleral icterus.        Right eye: No discharge.         Left eye: No discharge.      Extraocular Movements: Extraocular movements intact.      Conjunctiva/sclera: Conjunctivae normal.      Pupils: Pupils are equal, round, and reactive to light.   Cardiovascular:      Rate and Rhythm: Normal rate and regular rhythm.      Heart sounds: Normal heart sounds. No murmur heard.  Pulmonary:      Effort: Pulmonary effort is normal. No respiratory distress.      Breath sounds: Normal breath sounds.   Abdominal:      General: Bowel sounds are normal. There is no distension.      Palpations: Abdomen is soft.      Tenderness: There is no abdominal tenderness. There is no guarding or rebound.      Comments: WHSS     Skin:     General: Skin is warm and dry.      Coloration: Skin is not pale.      Findings: No erythema or rash.   Neurological:      General: No focal deficit present.      Mental Status: She is alert and oriented to person, place, and time.   Psychiatric:         Mood and Affect: Mood normal.         Behavior: Behavior normal.         Thought Content: Thought content normal.         Judgment: Judgment normal.         ASSESSMENT:  - Morbid obesity, Body mass index is 53 kg/m². s/p laparoscopic Francisco-en-Y on 5/1/2025 with Dr Ang.  - Estimated goal weight is 50% EWL  - Co-morbidities: benign hypertension, GERD, and hyperlipidemia.   - Good Weight loss, 50 lbs, 20% EWL  - Good daily activity: walking and taking the steps.  No formal Exercise regimen  - Good Vitamin Regimen  - Good Diet    PLAN:  - Regular exercise and adherence to bariatric diet to achieve maximum weight loss.  - Follow-up with dietician to advance/re enforce diet.  - Full bariatric vitamin regimen  - Ursodiol 500 mg daily for 6 months for the gallbladder.  - Anti-Acid medication, Omeprazole daily for 3 months.  - Lifting restriction, no lifting more than 10 lbs for 6 weeks  total.  - Miralax daily for constipation, no fiber.  - No NSAIDs, Tylenol for pain.  - Can swallow whole pills.  - RTC per post op schedule.  - Call the office for any issues.  - Check labs as scheduled.    20 minute visit, over 50% of time spent counseling patient face to face on diet, exercise, and weight loss.

## 2025-06-25 NOTE — PROGRESS NOTES
NUTRITION NOTE  Referring Physician: Arnel Ang M.D.   Reason for MNT Referral: Follow-up 8 Weeks s/p laparoscopic Francisco-en-Y gastric bypass    PAST MEDICAL HISTORY:  Denies nausea, vomiting, constipation, and diarrhea.  Reports doing well.    Past Medical History:   Diagnosis Date    Asthma     GERD (gastroesophageal reflux disease)     GERD with esophagitis     Hyperlipidemia     Hypertension     Left ovarian cyst      CLINICAL DATA:  44 y.o.-year-old Black or  female.    Consult wt: 397 lbs  Pre-op wt: 381 lbs  Current Weight: 348 lbs  BMI: 53.00  Total Weight Loss: 33 lbs    Excess Weight Loss: 14%      CURRENT DIET:  Bariatric Soft Diet    Diet Recall: 63 grams of protein/day; 51 oz of fluids/day    B: Boiled or scrambled egg with cheese  L: Protein water or baked fish filet (5 oz) with spinach, broccoli, or green beans  D: Usually skips, still full from lunch, may nibble on something that she prepared for the kids    Diet Includes:   Meal Pattern: 2 meal(s) + 0 snack(s) + 1 protein supplement(s)  Adequate protein supplement intake. Seeq protein powder  Adequate dairy intake. Cheese, Yoplait  Adequate vegetable intake. Spinach, broccoli, or green beans  Adequate fruit intake. Watermelon  Starchy CHO: None  Beverages: Water, SF peach/john tea,     EXERCISE:  Walking, activities with the kids, walks upstairs every hour at work     LABS:  None available at time of visit    VITAMINS/MINERALS:  Barimelt Multivitamin with 18 mg iron, 2 per day  Celebrate Calcium Citrate chews 500 mg with vitamin D, 3 per day   B-1 250 mg, 3 per week due to low B-1  B-12 2500 mcg sublingual, 1 per week     ASSESSMENT:  Doing ok overall.  Inadequate protein intake.  Inadequate fluid intake.  Advancing diet appropriately.  Exercising some  Adequate vitamins & minerals.    BARIATRIC DIET DISCUSSION:  Instructed and provided written materials on bariatric regular diet plan.  Reinforced post-op nutrition  guidelines.  Reminded PT no starchy CHO until goal weight  Encouraged PT to include strength training in exercise regimen  Discussed tracking intake to ensure protein goals and calorie range are met  Discussed eating smaller, more frequent meals throughout day  Reviewed yogurt guidelines  Reviewed protein bar guidelines  Discussed high protein snacks    PLAN / RECOMMENDATIONS:  Advance to bariatric regular diet.  Increase protein intake.  Increase fluid intake.  Increase light exercise.  Continue appropriate vitamins & minerals.    Return to clinic in 4 months.    SESSION TIME: 30 minutes

## 2025-06-30 ENCOUNTER — TELEPHONE (OUTPATIENT)
Dept: BARIATRICS | Facility: CLINIC | Age: 45
End: 2025-06-30
Payer: COMMERCIAL

## 2025-06-30 LAB — W VITAMIN B1: 82 UG/L

## 2025-06-30 NOTE — TELEPHONE ENCOUNTER
Called PT per Dr Ang. Let PT know that B-1 is good, and to reduce intake to 250 mg weekly. Also let PT know saturated iron is low. Suggested PT take 65 mg iron every 3 days. PT expressed understanding and appreciation.

## 2025-06-30 NOTE — TELEPHONE ENCOUNTER
----- Message from Arnel Ang MD sent at 6/25/2025 11:31 AM CDT -----  Her iron is a little low.  Can you please contact her to discuss.  Thanks!    ----- Message -----  From: Lab, Background User  Sent: 6/25/2025  11:05 AM CDT  To: Arnel Ang Jr., MD

## 2025-07-11 ENCOUNTER — TELEPHONE (OUTPATIENT)
Dept: BARIATRICS | Facility: CLINIC | Age: 45
End: 2025-07-11
Payer: COMMERCIAL

## 2025-07-11 NOTE — TELEPHONE ENCOUNTER
----- Message from Arnel Ang MD sent at 5/13/2025 10:54 AM CDT -----  Can we get her an appointment with Hepatology please.  Looks like everything is ok from the liver biopsy we did at surgery but I would like for them to see her.  Thanks!    ----- Message -----  From: Lab, Background User  Sent: 5/2/2025   2:04 PM CDT  To: Arnel Ang Jr., MD

## (undated) DEVICE — STAPLER ECHELON LONG 60X440MM

## (undated) DEVICE — TRAY GENERAL SURGERY OMC

## (undated) DEVICE — NDL HYPO REG 25G X 1 1/2

## (undated) DEVICE — RELOAD ECHELON FLEX WHT 60MM

## (undated) DEVICE — SUT SURGIDAC ENDO STITCH2-0

## (undated) DEVICE — SOL NACL 0.9% IV INJ 1000ML

## (undated) DEVICE — DRAPE ABDOMINAL TIBURON 14X11

## (undated) DEVICE — ELECTRODE MEGADYNE RETURN DUAL

## (undated) DEVICE — Device

## (undated) DEVICE — IRRIGATOR ENDOSCOPY DISP.

## (undated) DEVICE — LUBRICANT SURGILUBE 2 OZ

## (undated) DEVICE — TROCAR ENDOPATH XCEL 12MM 10CM

## (undated) DEVICE — TROCAR ENDOPATH XCEL 12X100MM

## (undated) DEVICE — CANNULA ENDOPATH XCEL 5X100MM

## (undated) DEVICE — SUT MCRYL PLUS 4-0 PS2 27IN

## (undated) DEVICE — CLOSURE SKIN STERI STRIP 1/2X4

## (undated) DEVICE — BAG TISS RETRV MONARCH 10MM

## (undated) DEVICE — BOWL STERILE LARGE 32OZ

## (undated) DEVICE — RELOAD ECHELON FLEX BLU 60MM

## (undated) DEVICE — STAPLER 3.5MM

## (undated) DEVICE — APPLICATOR CHLORAPREP ORN 26ML

## (undated) DEVICE — ENDOSTITCH POLYSORB 2-0 ES-9

## (undated) DEVICE — ELECTRODE REM PLYHSV RETURN 9

## (undated) DEVICE — TROCAR ENDOPATH XCEL 5X100MM

## (undated) DEVICE — PENCIL ROCKER SWITCH 10FT CORD

## (undated) DEVICE — SYR 10CC LUER LOCK

## (undated) DEVICE — TUBING HF INSUFFLATION W/ FLTR

## (undated) DEVICE — GOWN POLY REINF BRTH SLV XL

## (undated) DEVICE — ADHESIVE MASTISOL VIAL 48/BX

## (undated) DEVICE — DISSECTOR SONICISION CRV 39CM

## (undated) DEVICE — SUT VICRYL+ 27 UR-6 VIOL

## (undated) DEVICE — ENDOSTITCH INSTRUMENT